# Patient Record
Sex: FEMALE | Race: WHITE | NOT HISPANIC OR LATINO | Employment: FULL TIME | URBAN - NONMETROPOLITAN AREA
[De-identification: names, ages, dates, MRNs, and addresses within clinical notes are randomized per-mention and may not be internally consistent; named-entity substitution may affect disease eponyms.]

---

## 2017-01-16 ENCOUNTER — OFFICE VISIT (OUTPATIENT)
Dept: FAMILY MEDICINE CLINIC | Facility: CLINIC | Age: 32
End: 2017-01-16

## 2017-01-16 VITALS
OXYGEN SATURATION: 99 % | SYSTOLIC BLOOD PRESSURE: 110 MMHG | BODY MASS INDEX: 24.07 KG/M2 | HEART RATE: 89 BPM | DIASTOLIC BLOOD PRESSURE: 60 MMHG | HEIGHT: 64 IN | TEMPERATURE: 98.5 F | WEIGHT: 141 LBS

## 2017-01-16 DIAGNOSIS — F41.9 ANXIETY: ICD-10-CM

## 2017-01-16 DIAGNOSIS — J06.9 ACUTE URI: Primary | ICD-10-CM

## 2017-01-16 DIAGNOSIS — J30.89 OTHER ALLERGIC RHINITIS: ICD-10-CM

## 2017-01-16 PROBLEM — G25.81 RESTLESS LEG SYNDROME: Status: ACTIVE | Noted: 2017-01-16

## 2017-01-16 PROCEDURE — 99214 OFFICE O/P EST MOD 30 MIN: CPT | Performed by: NURSE PRACTITIONER

## 2017-01-16 RX ORDER — MONTELUKAST SODIUM 10 MG/1
10 TABLET ORAL NIGHTLY
Qty: 30 TABLET | Refills: 5 | Status: SHIPPED | OUTPATIENT
Start: 2017-01-16 | End: 2017-08-31 | Stop reason: SDUPTHER

## 2017-01-16 RX ORDER — FLUTICASONE PROPIONATE 50 MCG
2 SPRAY, SUSPENSION (ML) NASAL DAILY
Qty: 1 BOTTLE | Refills: 5 | Status: SHIPPED | OUTPATIENT
Start: 2017-01-16 | End: 2017-04-04

## 2017-01-16 RX ORDER — ERGOCALCIFEROL 1.25 MG/1
50000 CAPSULE ORAL
Qty: 4 CAPSULE | Refills: 5 | Status: SHIPPED | OUTPATIENT
Start: 2017-01-16 | End: 2017-11-30

## 2017-01-16 RX ORDER — FLUOXETINE HYDROCHLORIDE 20 MG/1
20 CAPSULE ORAL DAILY
Qty: 30 CAPSULE | Refills: 5 | Status: SHIPPED | OUTPATIENT
Start: 2017-01-16 | End: 2017-08-31 | Stop reason: SDDI

## 2017-01-16 RX ORDER — ROPINIROLE 0.25 MG/1
0.25 TABLET, FILM COATED ORAL NIGHTLY
Qty: 30 TABLET | Refills: 2 | Status: SHIPPED | OUTPATIENT
Start: 2017-01-16 | End: 2017-01-16

## 2017-01-16 RX ORDER — CETIRIZINE HYDROCHLORIDE 10 MG/1
10 TABLET ORAL DAILY
Qty: 30 TABLET | Refills: 5 | Status: SHIPPED | OUTPATIENT
Start: 2017-01-16 | End: 2017-08-31 | Stop reason: SDUPTHER

## 2017-01-16 RX ORDER — PRENATAL VIT/IRON FUM/FOLIC AC 27MG-0.8MG
1 TABLET ORAL DAILY
Qty: 30 TABLET | Refills: 5 | Status: SHIPPED | OUTPATIENT
Start: 2017-01-16 | End: 2017-08-31 | Stop reason: SDUPTHER

## 2017-01-16 RX ORDER — FLUOXETINE HYDROCHLORIDE 20 MG/1
20 CAPSULE ORAL DAILY
Qty: 30 CAPSULE | Refills: 5 | Status: SHIPPED | OUTPATIENT
Start: 2017-01-16 | End: 2017-01-16 | Stop reason: SDUPTHER

## 2017-01-16 RX ORDER — ALBUTEROL SULFATE 90 UG/1
2 AEROSOL, METERED RESPIRATORY (INHALATION) EVERY 4 HOURS PRN
Qty: 1 INHALER | Refills: 5 | Status: SHIPPED | OUTPATIENT
Start: 2017-01-16 | End: 2017-08-31 | Stop reason: SDUPTHER

## 2017-01-16 RX ORDER — CEFDINIR 300 MG/1
300 CAPSULE ORAL 2 TIMES DAILY
Qty: 20 CAPSULE | Refills: 0 | Status: SHIPPED | OUTPATIENT
Start: 2017-01-16 | End: 2017-01-26

## 2017-01-16 RX ORDER — MONTELUKAST SODIUM 10 MG/1
10 TABLET ORAL NIGHTLY
Qty: 30 TABLET | Refills: 5 | Status: SHIPPED | OUTPATIENT
Start: 2017-01-16 | End: 2017-01-16 | Stop reason: SDUPTHER

## 2017-01-16 NOTE — MR AVS SNAPSHOT
Zhanna Veras   1/16/2017 11:40 AM   Office Visit    Dept Phone:  [phone]   Encounter #:  [id number]    Provider:  NABEEL Jenkins   Department:  [facility]                Your Full Care Plan              Today's Medication Changes          These changes are accurate as of: 1/16/17 12:03 PM.  If you have any questions, ask your nurse or doctor.               New Medication(s)Ordered:     cefdinir 300 MG capsule   Commonly known as:  OMNICEF   Take 1 capsule by mouth 2 (Two) Times a Day for 10 days.   Started by:  NABEEL Jenkins       fluticasone 50 MCG/ACT nasal spray   Commonly known as:  FLONASE   2 sprays into each nostril Daily. Administer 2 sprays in each nostril for each dose.   Started by:  NABEEL Jenkins         Medication(s)that have changed:     FLUoxetine 20 MG capsule   Commonly known as:  PROzac   Take 1 capsule by mouth Daily.   What changed:    - medication strength  - how much to take   Changed by:  NABEEL Jenkins       Fluticasone Furoate-Vilanterol 100-25 MCG/INH aerosol powder    Commonly known as:  BREO ELLIPTA   Inhale 1 puff Daily.   What changed:    - how much to take  - when to take this   Changed by:  NABEEL Jenkins            Where to Get Your Medications      These medications were sent to [address] - [phone]  - [phone] [address] [address]     Phone:  [phone]     fluticasone 50 MCG/ACT nasal spray         These medications were sent to [facility] [address] [place] [address] - [address] [place] [address] - [phone] PH - [phone] FX  [address] [place] [address]     Phone:  [phone]     albuterol 108 (90 BASE) MCG/ACT inhaler    cefdinir 300 MG capsule    cetirizine 10 MG tablet    FLUoxetine 20 MG capsule    Fluticasone  Furoate-Vilanterol 100-25 MCG/INH aerosol powder     montelukast 10 MG tablet    prenatal vitamin 27-0.8 27-0.8 MG tablet tablet    vitamin D 39218 UNITS capsule capsule                  Your Updated Medication List          This list is accurate as of: 1/16/17 12:03 PM.  Always use your most recent med list.                albuterol 108 (90 BASE) MCG/ACT inhaler   Commonly known as:  PROVENTIL HFA;VENTOLIN HFA   Inhale 2 puffs Every 4 (Four) Hours As Needed for wheezing. 1-2 puffs every 4 hours as needed       cefdinir 300 MG capsule   Commonly known as:  OMNICEF   Take 1 capsule by mouth 2 (Two) Times a Day for 10 days.       cetirizine 10 MG tablet   Commonly known as:  zyrTEC   Take 1 tablet by mouth Daily.       FLUoxetine 20 MG capsule   Commonly known as:  PROzac   Take 1 capsule by mouth Daily.       fluticasone 50 MCG/ACT nasal spray   Commonly known as:  FLONASE   2 sprays into each nostril Daily. Administer 2 sprays in each nostril for each dose.       Fluticasone Furoate-Vilanterol 100-25 MCG/INH aerosol powder    Commonly known as:  BREO ELLIPTA   Inhale 1 puff Daily.       montelukast 10 MG tablet   Commonly known as:  SINGULAIR   Take 1 tablet by mouth Every Night.       ondansetron 4 MG tablet   Commonly known as:  ZOFRAN   Take 1 tablet by mouth Every 8 (Eight) Hours As Needed for nausea or vomiting.       prenatal vitamin 27-0.8 27-0.8 MG tablet tablet   Take 1 tablet by mouth Daily.       vitamin D 95794 UNITS capsule capsule   Commonly known as:  ERGOCALCIFEROL   Take 1 capsule by mouth Every 7 (Seven) Days.               You Were Diagnosed With        Codes Comments    Acute URI    -  Primary ICD-10-CM: J06.9  ICD-9-CM: 465.9     Anxiety     ICD-10-CM: F41.9  ICD-9-CM: 300.00     Other allergic rhinitis     ICD-10-CM: J30.89  ICD-9-CM: 477.8 refill routine medications  allergy precautions        Instructions     None    Patient Instructions History      Upcoming Appointments     Visit Type Date  "Time Department    OFFICE VISIT 1/16/2017 11:40 AM [facility]    OFFICE VISIT 7/20/2017  5:00 PM [facility]      [employer] Signup     Our records indicate that you have an active [facility] [employer] account.    You can view your After Visit Summary by going to [employer] and logging in with your [employer] username and password.  If you don't have a [employer] username and password but a parent or guardian has access to your record, the parent or guardian should login with their own [employer] username and password and access your record to view the After Visit Summary.    If you have questions, you can email [email or web address] or call [phone] to talk to our [employer] staff.  Remember, [employer] is NOT to be used for urgent needs.  For medical emergencies, dial 911.               Other Info from Your Visit           Your Appointments     Jul 20, 2017  5:00 PM EDT   Office Visit with NABEEL Jenkins   [facility] (--)    [address] [place] [id number] [phone]           Please arrive 10 minutes early, bring a complete list of all medications and bring any previous records or diagnostic testing results.              Allergies     Penicillins        Reason for Visit     URI     Anxiety     Restless Legs Syndrome           Vital Signs     Blood Pressure Pulse Temperature Height Weight Last Menstrual Period    110/60 (BP Location: Right arm, Patient Position: Sitting, Cuff Size: Adult) 89 98.5 °F (36.9 °C) (Oral) 64\" (162.6 cm) 141 lb (64 kg) 12/24/2016    Oxygen Saturation Body Mass Index Smoking Status             99% 24.2 kg/m2 Former Smoker         Problems and Diagnoses Noted     Restless leg syndrome    Acute upper respiratory infection    -  Primary    Anxiety problem        Other allergic rhinitis            "

## 2017-01-16 NOTE — PROGRESS NOTES
Subjective   Zhanna Veras is a 31 y.o. female.     History of Present Illness     Uri symptoms   Head pressure and sinus pressure for one week, ears feel full.     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 She is feeling anxious and would like to discuss raising her Prozac. Denies thoughts of hurting self or others.     Asthma  Asthma symptoms are stable with use of Breo. No recent exacerbations.    Allergic Rhinitis  Pt has allergic rhinitis. Recent exacerbation present. She is going in and out of different homes for work and has no control over exposure to environmental triggers.        The following portions of the patient's history were reviewed and updated as appropriate: allergies, current medications, past family history, past medical history, past social history, past surgical history and problem list.    Review of Systems   Constitutional: Negative for activity change, appetite change, chills, fatigue and fever.   HENT: Positive for congestion, ear pain, sinus pressure and sore throat. Negative for facial swelling, hearing loss, trouble swallowing and voice change.    Eyes: Positive for itching. Negative for pain, discharge and visual disturbance.   Respiratory: Negative for apnea, cough, chest tightness, shortness of breath and wheezing.    Cardiovascular: Negative for chest pain, palpitations and leg swelling.   Gastrointestinal: Negative for abdominal pain, anal bleeding, blood in stool, constipation, diarrhea, nausea and vomiting.   Genitourinary: Negative for dysuria.   Musculoskeletal: Negative for arthralgias and neck stiffness.   Skin: Negative for color change.   Allergic/Immunologic: Positive for environmental allergies. Negative for food allergies and immunocompromised state.   Neurological: Positive for headaches (sinus region bilateral).   Hematological: Negative for adenopathy. Does not bruise/bleed easily.    Psychiatric/Behavioral: Negative for confusion, self-injury, sleep disturbance and suicidal ideas. The patient is nervous/anxious.    All other systems reviewed and are negative.      Objective   Physical Exam   Constitutional: She is oriented to person, place, and time. She appears well-developed and well-nourished. No distress.   HENT:   Head: Normocephalic.   Right Ear: External ear normal. Tympanic membrane is erythematous. A middle ear effusion is present.   Left Ear: Hearing, tympanic membrane, external ear and ear canal normal.   Nose: Right sinus exhibits maxillary sinus tenderness and frontal sinus tenderness. Left sinus exhibits no maxillary sinus tenderness and no frontal sinus tenderness.   Mouth/Throat: Uvula is midline and mucous membranes are normal. Oropharyngeal exudate and posterior oropharyngeal erythema present.   Eyes: EOM and lids are normal. Pupils are equal, round, and reactive to light.   Neck: Normal range of motion. Neck supple. No tracheal deviation present. No thyromegaly present.   Cardiovascular: Normal rate, regular rhythm and normal heart sounds.  Exam reveals no gallop and no friction rub.    No murmur heard.  Pulmonary/Chest: Effort normal and breath sounds normal. No respiratory distress. She has no wheezes. She has no rales. She exhibits no tenderness.   Abdominal: Soft. Bowel sounds are normal. She exhibits no distension and no mass. There is no tenderness. There is no rebound and no guarding.   Musculoskeletal: Normal range of motion.   Neurological: She is alert and oriented to person, place, and time. She has normal reflexes.   CN 2-12 grossly intact    Skin: Skin is warm and dry. No rash noted. No erythema.   Psychiatric: She has a normal mood and affect. Her speech is normal and behavior is normal. Judgment and thought content normal. Cognition and memory are normal.   Vitals reviewed.      Assessment/Plan   Zhanna was seen today for uri, anxiety, asthma and allergic  rhinitis.    Diagnoses and all orders for this visit:    Acute URI  Fluids, rest, symptomatic treatment advised. Discussed symptoms to report as well as reasons to seek urgent or emergent medical attention. Understanding stated.   RTC 2-5 days if not improved, sooner if condition worsens/changes. Symptomatic care advised as well as reasons for urgent or emergent care. Pt / family state understanding.     Anxiety  -     Discontinue: FLUoxetine (PROzac) 20 MG capsule; Take 1 capsule by mouth Daily.  -     FLUoxetine (PROzac) 20 MG capsule; Take 1 capsule by mouth Daily.    Other allergic rhinitis  Comments:  refill routine medications  allergy precautions    Orders:  -     montelukast (SINGULAIR) 10 MG tablet; Take 1 tablet by mouth Every Night.    Other orders  -     fluticasone (FLONASE) 50 MCG/ACT nasal spray; 2 sprays into each nostril Daily. Administer 2 sprays in each nostril for each dose.  -     cefdinir (OMNICEF) 300 MG capsule; Take 1 capsule by mouth 2 (Two) Times a Day for 10 days.  -     cetirizine (zyrTEC) 10 MG tablet; Take 1 tablet by mouth Daily.  -     Fluticasone Furoate-Vilanterol (BREO ELLIPTA) 100-25 MCG/INH aerosol powder ; Inhale 1 puff Daily.  -     Prenatal Vit-Fe Fumarate-FA (PRENATAL VITAMIN 27-0.8) 27-0.8 MG tablet tablet; Take 1 tablet by mouth Daily.  -     vitamin D (ERGOCALCIFEROL) 35638 UNITS capsule capsule; Take 1 capsule by mouth Every 7 (Seven) Days.  -     albuterol (PROVENTIL HFA;VENTOLIN HFA) 108 (90 BASE) MCG/ACT inhaler; Inhale 2 puffs Every 4 (Four) Hours As Needed for wheezing. 1-2 puffs every 4 hours as needed        I have discussed diagnosis in detail today allowing time for questions and answers. Pt is aware of reasons to seek urgent or emergent medical care as well as reasons to return to the clinic for evaluation. Possible side effects, interactions and progression of symptoms discussed as well. Pt / family states understanding.   Samples of Breo provided x 3 months  supply.  Asthma / Allergy precautions reviewed.  Emotional support and active listening provided.   Will raise Prozac to 20 mg daily and evaluate for improved symptoms over the next few weeks. Pt understands reasons for additional medical visit and symptoms to report.   Follow up 3-6 months , sooner if needed.

## 2017-04-04 ENCOUNTER — OFFICE VISIT (OUTPATIENT)
Dept: FAMILY MEDICINE CLINIC | Facility: CLINIC | Age: 32
End: 2017-04-04

## 2017-04-04 VITALS
SYSTOLIC BLOOD PRESSURE: 94 MMHG | WEIGHT: 134 LBS | HEART RATE: 103 BPM | TEMPERATURE: 98.7 F | BODY MASS INDEX: 22.88 KG/M2 | HEIGHT: 64 IN | DIASTOLIC BLOOD PRESSURE: 60 MMHG | OXYGEN SATURATION: 97 %

## 2017-04-04 DIAGNOSIS — M62.830 MUSCLE SPASM OF BACK: ICD-10-CM

## 2017-04-04 DIAGNOSIS — M54.42 ACUTE BILATERAL LOW BACK PAIN WITH LEFT-SIDED SCIATICA: Primary | ICD-10-CM

## 2017-04-04 PROCEDURE — 99214 OFFICE O/P EST MOD 30 MIN: CPT | Performed by: NURSE PRACTITIONER

## 2017-04-04 PROCEDURE — 96372 THER/PROPH/DIAG INJ SC/IM: CPT | Performed by: NURSE PRACTITIONER

## 2017-04-04 RX ORDER — TIZANIDINE HYDROCHLORIDE 4 MG/1
4 CAPSULE, GELATIN COATED ORAL 2 TIMES DAILY PRN
Qty: 60 CAPSULE | Refills: 1 | Status: SHIPPED | OUTPATIENT
Start: 2017-04-04 | End: 2017-08-31

## 2017-04-04 RX ORDER — IBUPROFEN 600 MG/1
600 TABLET ORAL EVERY 8 HOURS PRN
Qty: 30 TABLET | Refills: 5 | Status: SHIPPED | OUTPATIENT
Start: 2017-04-04 | End: 2017-08-31

## 2017-04-04 RX ORDER — METHYLPREDNISOLONE ACETATE 40 MG/ML
40 INJECTION, SUSPENSION INTRA-ARTICULAR; INTRALESIONAL; INTRAMUSCULAR; SOFT TISSUE ONCE
Status: COMPLETED | OUTPATIENT
Start: 2017-04-04 | End: 2017-04-04

## 2017-04-04 RX ORDER — KETOROLAC TROMETHAMINE 30 MG/ML
60 INJECTION, SOLUTION INTRAMUSCULAR; INTRAVENOUS ONCE
Status: COMPLETED | OUTPATIENT
Start: 2017-04-04 | End: 2017-04-04

## 2017-04-04 RX ADMIN — KETOROLAC TROMETHAMINE 60 MG: 30 INJECTION, SOLUTION INTRAMUSCULAR; INTRAVENOUS at 11:44

## 2017-04-04 RX ADMIN — METHYLPREDNISOLONE ACETATE 40 MG: 40 INJECTION, SUSPENSION INTRA-ARTICULAR; INTRALESIONAL; INTRAMUSCULAR; SOFT TISSUE at 11:46

## 2017-04-04 NOTE — PROGRESS NOTES
Subjective   Zhanna Veras is a 31 y.o. female.     History of Present Illness     Acute onset of low back pain   Pt reports some low back pain with radiation into her left hip. This started about 3 weeks ago after doing some work on her porch, lifting some boards.   Pain is gradually getting worse. Rated 7-8 on psr. Described as stabbing pain in lower back at her lumbar section. Rest and laying flat helps, activity makes worse.   Has taken some motrin which helps mildly. Not tried heat.She is trying to avoid lifting. No changes in bowel or bladder function.         The following portions of the patient's history were reviewed and updated as appropriate: allergies, current medications, past family history, past medical history, past social history, past surgical history and problem list.    Review of Systems   Constitutional: Positive for activity change. Negative for appetite change, chills, fatigue, fever and unexpected weight change.   HENT: Negative.    Eyes: Negative.    Respiratory: Negative for cough, chest tightness and wheezing.    Cardiovascular: Negative for chest pain, palpitations and leg swelling.   Gastrointestinal: Negative for abdominal pain, constipation, nausea and vomiting.   Endocrine: Negative.    Genitourinary: Negative for difficulty urinating and dysuria.   Musculoskeletal: Positive for back pain. Negative for neck pain.   Skin: Negative for rash.   Allergic/Immunologic: Positive for environmental allergies.   Neurological: Negative for tremors and numbness.   Hematological: Negative.    Psychiatric/Behavioral: Negative for self-injury and suicidal ideas. The patient is not nervous/anxious.    All other systems reviewed and are negative.      Objective   Physical Exam   Constitutional: She is oriented to person, place, and time. She appears well-developed and well-nourished.   HENT:   Head: Normocephalic.   Right Ear: External ear normal.   Left Ear: External ear normal.   Nose: Nose  normal.   Mouth/Throat: Oropharynx is clear and moist.   Eyes: Pupils are equal, round, and reactive to light.   Neck: Normal range of motion. Neck supple. No tracheal deviation present. No thyromegaly present.   Cardiovascular: Normal rate, regular rhythm and normal heart sounds.  Exam reveals no gallop and no friction rub.    No murmur heard.  Pulmonary/Chest: Effort normal and breath sounds normal. No respiratory distress. She has no wheezes. She has no rales. She exhibits no tenderness.   Abdominal: Soft. Bowel sounds are normal. She exhibits no distension and no mass. There is no tenderness. There is no rebound and no guarding.   Musculoskeletal: Normal range of motion.        Left hip: She exhibits tenderness and crepitus.        Lumbar back: She exhibits tenderness, pain and spasm.   Neurological: She is alert and oriented to person, place, and time. She has normal strength and normal reflexes.   CN 2-12 grossly intact    Skin: Skin is warm and dry. No rash noted. No erythema.   Psychiatric: She has a normal mood and affect. Her speech is normal and behavior is normal. Judgment and thought content normal. Cognition and memory are normal.   Vitals reviewed.      Assessment/Plan   Zhanna was seen today for back pain.    Diagnoses and all orders for this visit:    Acute bilateral low back pain with left-sided sciatica  -     XR Spine Lumbar 2 or 3 View  -     XR Hip With or Without Pelvis 2 - 3 View Left; Future  -     ketorolac (TORADOL) injection 60 mg; Inject 60 mg into the shoulder, thigh, or buttocks 1 (One) Time.  -     methylPREDNISolone acetate (DEPO-medrol) injection 40 mg; Inject 1 mL into the shoulder, thigh, or buttocks 1 (One) Time.    Muscle spasm of back    Other orders  -     TiZANidine (ZANAFLEX) 4 MG capsule; Take 1 capsule by mouth 2 (Two) Times a Day As Needed for Muscle Spasms.  -     ibuprofen (ADVIL,MOTRIN) 600 MG tablet; Take 1 tablet by mouth Every 8 (Eight) Hours As Needed for Mild Pain  (1-3) or Moderate Pain (4-6).      Avoid lifting > 20 pounds and twisting movement for the next week. No sweeping, moping , etc.   Order for xrays.   Toradol and depo-medrol injection today.   Body mechanics reviewed.  I have discussed diagnosis in detail today allowing time for questions and answers. Pt is aware of reasons to seek urgent or emergent medical care as well as reasons to return to the clinic for evaluation. Possible side effects, interactions and progression of symptoms discussed as well. Pt / family states understanding.   Follow up 2-5 days if not improved.

## 2017-08-31 ENCOUNTER — OFFICE VISIT (OUTPATIENT)
Dept: FAMILY MEDICINE CLINIC | Facility: CLINIC | Age: 32
End: 2017-08-31

## 2017-08-31 VITALS
DIASTOLIC BLOOD PRESSURE: 70 MMHG | HEIGHT: 64 IN | OXYGEN SATURATION: 99 % | SYSTOLIC BLOOD PRESSURE: 110 MMHG | TEMPERATURE: 98 F | HEART RATE: 91 BPM | BODY MASS INDEX: 24.24 KG/M2 | WEIGHT: 142 LBS

## 2017-08-31 DIAGNOSIS — J45.20 MILD INTERMITTENT ASTHMA WITHOUT COMPLICATION: Primary | ICD-10-CM

## 2017-08-31 DIAGNOSIS — J30.89 OTHER ALLERGIC RHINITIS: ICD-10-CM

## 2017-08-31 DIAGNOSIS — E55.9 VITAMIN D DEFICIENCY: ICD-10-CM

## 2017-08-31 DIAGNOSIS — J45.901 ASTHMA EXACERBATION: ICD-10-CM

## 2017-08-31 DIAGNOSIS — R53.82 CHRONIC FATIGUE: ICD-10-CM

## 2017-08-31 PROCEDURE — 99214 OFFICE O/P EST MOD 30 MIN: CPT | Performed by: NURSE PRACTITIONER

## 2017-08-31 RX ORDER — MONTELUKAST SODIUM 10 MG/1
10 TABLET ORAL NIGHTLY
Qty: 30 TABLET | Refills: 5 | Status: SHIPPED | OUTPATIENT
Start: 2017-08-31 | End: 2018-02-22 | Stop reason: SDUPTHER

## 2017-08-31 RX ORDER — FLUTICASONE PROPIONATE 50 MCG
2 SPRAY, SUSPENSION (ML) NASAL DAILY
Qty: 1 BOTTLE | Refills: 5 | Status: SHIPPED | OUTPATIENT
Start: 2017-08-31 | End: 2017-11-30

## 2017-08-31 RX ORDER — ALBUTEROL SULFATE 90 UG/1
2 AEROSOL, METERED RESPIRATORY (INHALATION) EVERY 4 HOURS PRN
Qty: 1 INHALER | Refills: 5 | Status: SHIPPED | OUTPATIENT
Start: 2017-08-31 | End: 2017-11-30 | Stop reason: SDUPTHER

## 2017-08-31 RX ORDER — METHYLPREDNISOLONE 4 MG/1
TABLET ORAL
Qty: 1 EACH | Refills: 0 | Status: SHIPPED | OUTPATIENT
Start: 2017-08-31 | End: 2017-11-30

## 2017-08-31 RX ORDER — CETIRIZINE HYDROCHLORIDE 10 MG/1
10 TABLET ORAL DAILY
Qty: 30 TABLET | Refills: 5 | Status: SHIPPED | OUTPATIENT
Start: 2017-08-31 | End: 2018-02-22 | Stop reason: SDUPTHER

## 2017-08-31 RX ORDER — SULFAMETHOXAZOLE AND TRIMETHOPRIM 800; 160 MG/1; MG/1
1 TABLET ORAL 2 TIMES DAILY
Qty: 20 TABLET | Refills: 0 | Status: SHIPPED | OUTPATIENT
Start: 2017-08-31 | End: 2017-11-30

## 2017-08-31 RX ORDER — PRENATAL VIT/IRON FUM/FOLIC AC 27MG-0.8MG
1 TABLET ORAL DAILY
Qty: 30 TABLET | Refills: 5 | Status: SHIPPED | OUTPATIENT
Start: 2017-08-31 | End: 2018-02-22 | Stop reason: SDUPTHER

## 2017-08-31 RX ORDER — GUAIFENESIN/DEXTROMETHORPHAN 100-10MG/5
5 SYRUP ORAL 3 TIMES DAILY PRN
Qty: 236 ML | Refills: 0 | Status: SHIPPED | OUTPATIENT
Start: 2017-08-31 | End: 2017-11-30

## 2017-09-06 ENCOUNTER — LAB (OUTPATIENT)
Dept: FAMILY MEDICINE CLINIC | Facility: CLINIC | Age: 32
End: 2017-09-06

## 2017-09-06 DIAGNOSIS — J30.89 OTHER ALLERGIC RHINITIS: ICD-10-CM

## 2017-09-06 LAB
25(OH)D3+25(OH)D2 SERPL-MCNC: 28 NG/ML
ALBUMIN SERPL-MCNC: 4.7 G/DL (ref 3.5–5)
ALBUMIN/GLOB SERPL: 1.6 G/DL (ref 1.5–2.5)
ALP SERPL-CCNC: 60 U/L (ref 35–104)
ALT SERPL-CCNC: 12 U/L (ref 10–36)
AST SERPL-CCNC: 16 U/L (ref 10–30)
BASOPHILS # BLD AUTO: 0.02 10*3/MM3 (ref 0–0.3)
BASOPHILS NFR BLD AUTO: 0.2 % (ref 0–2)
BILIRUB SERPL-MCNC: 0.3 MG/DL (ref 0.2–1.8)
BUN SERPL-MCNC: 9 MG/DL (ref 7–21)
BUN/CREAT SERPL: 15 (ref 7–25)
CALCIUM SERPL-MCNC: 9.6 MG/DL (ref 7.7–10)
CHLORIDE SERPL-SCNC: 108 MMOL/L (ref 99–112)
CHOLEST SERPL-MCNC: 162 MG/DL (ref 0–200)
CO2 SERPL-SCNC: 24.2 MMOL/L (ref 24.3–31.9)
CREAT SERPL-MCNC: 0.6 MG/DL (ref 0.43–1.29)
EOSINOPHIL # BLD AUTO: 0.02 10*3/MM3 (ref 0–0.7)
EOSINOPHIL NFR BLD AUTO: 0.2 % (ref 0–5)
ERYTHROCYTE [DISTWIDTH] IN BLOOD BY AUTOMATED COUNT: 13.4 % (ref 11.5–14.5)
GLOBULIN SER CALC-MCNC: 2.9 GM/DL
GLUCOSE SERPL-MCNC: 93 MG/DL (ref 70–110)
HBA1C MFR BLD: 5.1 % (ref 4.5–5.7)
HCT VFR BLD AUTO: 39.2 % (ref 37–47)
HDLC SERPL-MCNC: 38 MG/DL (ref 60–100)
HGB BLD-MCNC: 12.3 G/DL (ref 12–16)
IMM GRANULOCYTES # BLD: 0.02 10*3/MM3 (ref 0–0.03)
IMM GRANULOCYTES NFR BLD: 0.2 % (ref 0–0.5)
LDLC SERPL CALC-MCNC: 112 MG/DL (ref 0–100)
LYMPHOCYTES # BLD AUTO: 1.75 10*3/MM3 (ref 1–3)
LYMPHOCYTES NFR BLD AUTO: 19.8 % (ref 21–51)
MCH RBC QN AUTO: 27.5 PG (ref 27–33)
MCHC RBC AUTO-ENTMCNC: 31.4 G/DL (ref 33–37)
MCV RBC AUTO: 87.7 FL (ref 80–94)
MONOCYTES # BLD AUTO: 0.42 10*3/MM3 (ref 0.1–0.9)
MONOCYTES NFR BLD AUTO: 4.7 % (ref 0–10)
NEUTROPHILS # BLD AUTO: 6.62 10*3/MM3 (ref 1.4–6.5)
NEUTROPHILS NFR BLD AUTO: 74.9 % (ref 30–70)
PLATELET # BLD AUTO: 286 10*3/MM3 (ref 130–400)
POTASSIUM SERPL-SCNC: 4 MMOL/L (ref 3.5–5.3)
PROT SERPL-MCNC: 7.6 G/DL (ref 6–8)
RBC # BLD AUTO: 4.47 10*6/MM3 (ref 4.2–5.4)
SODIUM SERPL-SCNC: 138 MMOL/L (ref 135–153)
TRIGL SERPL-MCNC: 62 MG/DL (ref 0–150)
TSH SERPL DL<=0.005 MIU/L-ACNC: 0.95 MIU/ML (ref 0.55–4.78)
VIT B12 SERPL-MCNC: 791 PG/ML (ref 211–911)
VLDLC SERPL CALC-MCNC: 12.4 MG/DL
WBC # BLD AUTO: 8.85 10*3/MM3 (ref 4.5–12.5)

## 2017-09-06 PROCEDURE — 36415 COLL VENOUS BLD VENIPUNCTURE: CPT | Performed by: NURSE PRACTITIONER

## 2017-09-13 ENCOUNTER — TELEPHONE (OUTPATIENT)
Dept: FAMILY MEDICINE CLINIC | Facility: CLINIC | Age: 32
End: 2017-09-13

## 2017-09-13 DIAGNOSIS — R30.0 DYSURIA: Primary | ICD-10-CM

## 2017-09-13 RX ORDER — AZITHROMYCIN 250 MG/1
TABLET, FILM COATED ORAL
Qty: 7 TABLET | Refills: 0 | Status: SHIPPED | OUTPATIENT
Start: 2017-09-13 | End: 2017-11-30

## 2017-09-13 NOTE — TELEPHONE ENCOUNTER
I called michael wing in and let her know that's at [facility] and she has appointment at [facility] on Sept 25 with dr Dr Bell

## 2017-09-13 NOTE — TELEPHONE ENCOUNTER
Health department has sent out stating patient has positive nitrates in her urine as well as having a positive pregnancy test.  Medical assistant has been instructed to notify the patient to stop all medications other than her asthma medicine and start a Z-Domingo #1.  Patient has been advised to schedule a gynecology appointment for OB.

## 2017-10-09 LAB
EXTERNAL ABO GROUPING: NORMAL
EXTERNAL ANTIBODY SCREEN: NEGATIVE
EXTERNAL HEPATITIS B SURFACE ANTIGEN: NEGATIVE
EXTERNAL RH FACTOR: POSITIVE
EXTERNAL RUBELLA QUALITATIVE: NORMAL
EXTERNAL SYPHILIS RPR SCREEN: NORMAL
HIV1 P24 AG SERPL QL IA: NEGATIVE

## 2017-11-30 ENCOUNTER — OFFICE VISIT (OUTPATIENT)
Dept: FAMILY MEDICINE CLINIC | Facility: CLINIC | Age: 32
End: 2017-11-30

## 2017-11-30 VITALS
BODY MASS INDEX: 25.95 KG/M2 | WEIGHT: 152 LBS | OXYGEN SATURATION: 97 % | HEART RATE: 79 BPM | HEIGHT: 64 IN | TEMPERATURE: 98.2 F | SYSTOLIC BLOOD PRESSURE: 110 MMHG | DIASTOLIC BLOOD PRESSURE: 70 MMHG

## 2017-11-30 DIAGNOSIS — Z79.899 HIGH RISK MEDICATION USE: ICD-10-CM

## 2017-11-30 DIAGNOSIS — Z3A.16 16 WEEKS GESTATION OF PREGNANCY: ICD-10-CM

## 2017-11-30 DIAGNOSIS — J45.20 MILD INTERMITTENT ASTHMA WITHOUT COMPLICATION: Primary | ICD-10-CM

## 2017-11-30 DIAGNOSIS — J30.2 ACUTE SEASONAL ALLERGIC RHINITIS, UNSPECIFIED TRIGGER: ICD-10-CM

## 2017-11-30 DIAGNOSIS — J45.901 EXACERBATION OF PERSISTENT ASTHMA, UNSPECIFIED ASTHMA SEVERITY: ICD-10-CM

## 2017-11-30 PROCEDURE — 99214 OFFICE O/P EST MOD 30 MIN: CPT | Performed by: NURSE PRACTITIONER

## 2017-11-30 RX ORDER — ALBUTEROL SULFATE 90 UG/1
2 AEROSOL, METERED RESPIRATORY (INHALATION) EVERY 4 HOURS PRN
Qty: 1 INHALER | Refills: 5 | Status: SHIPPED | OUTPATIENT
Start: 2017-11-30 | End: 2018-02-22 | Stop reason: SDUPTHER

## 2017-11-30 RX ORDER — GUAIFENESIN AND CODEINE PHOSPHATE 100; 10 MG/5ML; MG/5ML
5 SOLUTION ORAL 3 TIMES DAILY PRN
Qty: 180 ML | Refills: 0 | Status: SHIPPED | OUTPATIENT
Start: 2017-11-30 | End: 2018-02-22

## 2017-11-30 RX ORDER — GUAIFENESIN AND CODEINE PHOSPHATE 100; 10 MG/5ML; MG/5ML
5 SOLUTION ORAL 3 TIMES DAILY PRN
Qty: 180 ML | Refills: 0 | Status: SHIPPED | OUTPATIENT
Start: 2017-11-30 | End: 2017-11-30 | Stop reason: SDUPTHER

## 2017-11-30 NOTE — PROGRESS NOTES
"Yoni Veras is a 31 y.o. female.     Chief Complaint   Patient presents with   • Asthma   • Cough       History of Present Illness     Pt is pregnant and under the care of OB/GYN. This will be her fourth child. She is having some exacerbation of cough. Cough is worse at night . Coughs so hard at times that she leaks urine.  She is out of her rescue inhaler. She has been seen by pulmonology in the past but not seen by allergy/asthma.     Currently taking zyrtec, singulair and Spriva.     Seasonal AR symptoms - frequent seasonal exacerbations.       The following portions of the patient's history were reviewed and updated as appropriate: allergies, current medications, past family history, past medical history, past social history, past surgical history and problem list.    Review of Systems   Constitutional: Positive for activity change. Negative for appetite change, chills, fatigue and fever.   HENT: Negative for sore throat and trouble swallowing.    Eyes: Visual disturbance: wears glasses    Respiratory: Positive for cough, shortness of breath (during coughing spells) and wheezing (at night ). Negative for apnea and chest tightness.    Cardiovascular: Negative for chest pain and palpitations.   Gastrointestinal: Negative for abdominal pain, constipation, diarrhea and vomiting.   Endocrine: Negative.    Genitourinary: Negative for difficulty urinating and flank pain.   Allergic/Immunologic: Positive for environmental allergies.   Neurological: Negative.    Psychiatric/Behavioral: Negative for decreased concentration and suicidal ideas. The patient is not nervous/anxious.        Objective     /70 (BP Location: Right arm, Patient Position: Sitting, Cuff Size: Adult)  Pulse 79  Temp 98.2 °F (36.8 °C) (Tympanic)   Ht 64\" (162.6 cm)  Wt 152 lb (68.9 kg)  LMP 08/13/2017  SpO2 97%  BMI 26.09 kg/m2  Lab on 09/06/2017   Component Date Value Ref Range Status   • 25 Hydroxy, Vitamin D 09/06/2017 " 28.0  ng/mL Final   • Total Cholesterol 09/06/2017 162  0 - 200 mg/dL Final   • Triglycerides 09/06/2017 62  0 - 150 mg/dL Final   • HDL Cholesterol 09/06/2017 38* 60 - 100 mg/dL Final   • VLDL Cholesterol 09/06/2017 12.4  mg/dL Final   • LDL Cholesterol  09/06/2017 112* 0 - 100 mg/dL Final   • Vitamin B-12 09/06/2017 791  211 - 911 pg/mL Final   • Glucose 09/06/2017 93  70 - 110 mg/dL Final   • BUN 09/06/2017 9  7 - 21 mg/dL Final   • Creatinine 09/06/2017 0.60  0.43 - 1.29 mg/dL Final   • eGFR Non  Am 09/06/2017 117  >60 mL/min/1.73 Final   • eGFR African Am 09/06/2017 141  >60 mL/min/1.73 Final   • BUN/Creatinine Ratio 09/06/2017 15.0  7.0 - 25.0 Final   • Sodium 09/06/2017 138  135 - 153 mmol/L Final   • Potassium 09/06/2017 4.0  3.5 - 5.3 mmol/L Final   • Chloride 09/06/2017 108  99 - 112 mmol/L Final   • Total CO2 09/06/2017 24.2* 24.3 - 31.9 mmol/L Final   • Calcium 09/06/2017 9.6  7.7 - 10.0 mg/dL Final   • Total Protein 09/06/2017 7.6  6.0 - 8.0 g/dL Final   • Albumin 09/06/2017 4.70  3.50 - 5.00 g/dL Final   • Globulin 09/06/2017 2.9  gm/dL Final   • A/G Ratio 09/06/2017 1.6  1.5 - 2.5 g/dL Final   • Total Bilirubin 09/06/2017 0.3  0.2 - 1.8 mg/dL Final   • Alkaline Phosphatase 09/06/2017 60  35 - 104 U/L Final   • AST (SGOT) 09/06/2017 16  10 - 30 U/L Final   • ALT (SGPT) 09/06/2017 12  10 - 36 U/L Final   • TSH 09/06/2017 0.954  0.550 - 4.780 mIU/mL Final   • Hemoglobin A1C 09/06/2017 5.10  4.50 - 5.70 % Final   • WBC 09/06/2017 8.85  4.50 - 12.50 10*3/mm3 Final   • RBC 09/06/2017 4.47  4.20 - 5.40 10*6/mm3 Final   • Hemoglobin 09/06/2017 12.3  12.0 - 16.0 g/dL Final   • Hematocrit 09/06/2017 39.2  37.0 - 47.0 % Final   • MCV 09/06/2017 87.7  80.0 - 94.0 fL Final   • MCH 09/06/2017 27.5  27.0 - 33.0 pg Final   • MCHC 09/06/2017 31.4* 33.0 - 37.0 g/dL Final   • RDW 09/06/2017 13.4  11.5 - 14.5 % Final   • Platelets 09/06/2017 286  130 - 400 10*3/mm3 Final   • Neutrophil Rel % 09/06/2017 74.9* 30.0  - 70.0 % Final   • Lymphocyte Rel % 09/06/2017 19.8* 21.0 - 51.0 % Final   • Monocyte Rel % 09/06/2017 4.7  0.0 - 10.0 % Final   • Eosinophil Rel % 09/06/2017 0.2  0.0 - 5.0 % Final   • Basophil Rel % 09/06/2017 0.2  0.0 - 2.0 % Final   • Neutrophils Absolute 09/06/2017 6.62* 1.40 - 6.50 10*3/mm3 Final   • Lymphocytes Absolute 09/06/2017 1.75  1.00 - 3.00 10*3/mm3 Final   • Monocytes Absolute 09/06/2017 0.42  0.10 - 0.90 10*3/mm3 Final   • Eosinophils Absolute 09/06/2017 0.02  0.00 - 0.70 10*3/mm3 Final   • Basophils Absolute 09/06/2017 0.02  0.00 - 0.30 10*3/mm3 Final   • Immature Granulocyte Rel % 09/06/2017 0.2  0.0 - 0.5 % Final   • Immature Grans Absolute 09/06/2017 0.02  0.00 - 0.03 10*3/mm3 Final       Physical Exam   Constitutional: She is oriented to person, place, and time. She appears well-developed and well-nourished. No distress.   HENT:   Head: Normocephalic and atraumatic.   Right Ear: External ear normal.   Left Ear: External ear normal.   Nose: Nose normal.   Mouth/Throat: Oropharynx is clear and moist. No oropharyngeal exudate.   Eyes: Pupils are equal, round, and reactive to light.   Neck: Neck supple. No thyromegaly present.   Cardiovascular: Normal rate, regular rhythm and normal heart sounds.    Pulmonary/Chest: Effort normal and breath sounds normal. No respiratory distress. She has no wheezes. She exhibits tenderness (generalized due to cough ). She exhibits no bony tenderness and no crepitus.   Abdominal: Soft. She exhibits no distension. There is no tenderness.   Musculoskeletal: Normal range of motion.   Lymphadenopathy:     She has no cervical adenopathy.   Neurological: She is alert and oriented to person, place, and time.   Skin: Skin is warm and dry. She is not diaphoretic.   Psychiatric: She has a normal mood and affect. Her behavior is normal.   Vitals reviewed.      Assessment/Plan     Problem List Items Addressed This Visit        Respiratory    Mild intermittent asthma without  complication - Primary    Relevant Medications    albuterol (PROVENTIL HFA;VENTOLIN HFA) 108 (90 Base) MCG/ACT inhaler    Other Relevant Orders    Ambulatory Referral to Allergy (Completed)    Allergic rhinitis       Other    16 weeks gestation of pregnancy      Other Visit Diagnoses     Exacerbation of persistent asthma, unspecified asthma severity        Relevant Medications    albuterol (PROVENTIL HFA;VENTOLIN HFA) 108 (90 Base) MCG/ACT inhaler    High risk medication use        Relevant Orders    Urine Drug Screen - Urine, Clean Catch          Current Outpatient Prescriptions:   •  albuterol (PROVENTIL HFA;VENTOLIN HFA) 108 (90 Base) MCG/ACT inhaler, Inhale 2 puffs Every 4 (Four) Hours As Needed for Wheezing. 1-2 puffs every 4 hours as needed, Disp: 1 inhaler, Rfl: 5  •  cetirizine (zyrTEC) 10 MG tablet, Take 1 tablet by mouth Daily., Disp: 30 tablet, Rfl: 5  •  montelukast (SINGULAIR) 10 MG tablet, Take 1 tablet by mouth Every Night., Disp: 30 tablet, Rfl: 5  •  Prenatal Vit-Fe Fumarate-FA (PRENATAL VITAMIN 27-0.8) 27-0.8 MG tablet tablet, Take 1 tablet by mouth Daily., Disp: 30 tablet, Rfl: 5  •  Tiotropium Bromide Monohydrate (SPIRIVA RESPIMAT) 1.25 MCG/ACT aerosol solution inhaler, Inhale 2 puffs Daily., Disp: 1 inhaler, Rfl: 5  •  guaifenesin-codeine (CHERATUSSIN AC) 100-10 MG/5ML liquid, Take 5 mL by mouth 3 (Three) Times a Day As Needed for Cough., Disp: 180 mL, Rfl: 0    Obtain UDS.   Request Arturo.  Patient has been instructed and counseled regarding opioid misuse and risk of addiction.  We have discussed proper storage and disposal of controlled medication.  Asthma precautions reviewed and discussed.  I have discussed diagnosis in detail today allowing time for questions and answers. Pt is aware of reasons to seek urgent or emergent medical care as well as reasons to return to the clinic for evaluation. Possible side effects, interactions and progression of symptoms discussed as well. Pt / family  states understanding.   Emotional support and active listening provided.   Remain under the care of OB/GYN  Pregnancy precautions reviewed and discussed.   Refer to allergy and asthma for evaluation.  Order for rescue inhaler provided.   RTC 2-5 days if not improved, sooner if condition worsens/changes. Symptomatic care advised as well as reasons for urgent or emergent care. Pt / family state understanding.                This document has been electronically signed by:  NABEEL Hill, NP-C

## 2018-02-13 LAB — EXTERNAL GTT 1 HOUR: 94

## 2018-02-22 ENCOUNTER — OFFICE VISIT (OUTPATIENT)
Dept: FAMILY MEDICINE CLINIC | Facility: CLINIC | Age: 33
End: 2018-02-22

## 2018-02-22 VITALS
DIASTOLIC BLOOD PRESSURE: 80 MMHG | WEIGHT: 171 LBS | BODY MASS INDEX: 29.19 KG/M2 | HEART RATE: 89 BPM | HEIGHT: 64 IN | SYSTOLIC BLOOD PRESSURE: 132 MMHG | OXYGEN SATURATION: 98 % | TEMPERATURE: 97.9 F

## 2018-02-22 DIAGNOSIS — R00.2 INTERMITTENT PALPITATIONS: ICD-10-CM

## 2018-02-22 DIAGNOSIS — M62.830 MUSCLE SPASM OF BACK: ICD-10-CM
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
## 2018-02-22 DIAGNOSIS — J45.40 MODERATE PERSISTENT ASTHMA WITHOUT COMPLICATION: Primary | ICD-10-CM
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
## 2018-02-22 DIAGNOSIS — R53.82 CHRONIC FATIGUE: ICD-10-CM

## 2018-02-22 DIAGNOSIS — J30.89 OTHER ALLERGIC RHINITIS: ICD-10-CM

## 2018-02-22 PROCEDURE — 99214 OFFICE O/P EST MOD 30 MIN: CPT | Performed by: NURSE PRACTITIONER

## 2018-02-22 RX ORDER — MONTELUKAST SODIUM 10 MG/1
10 TABLET ORAL NIGHTLY
Qty: 30 TABLET | Refills: 5 | Status: SHIPPED | OUTPATIENT
Start: 2018-02-22 | End: 2018-08-22 | Stop reason: SDUPTHER

## 2018-02-22 RX ORDER — CETIRIZINE HYDROCHLORIDE 10 MG/1
10 TABLET ORAL DAILY
Qty: 30 TABLET | Refills: 5 | Status: SHIPPED | OUTPATIENT
Start: 2018-02-22 | End: 2018-08-22 | Stop reason: SDUPTHER

## 2018-02-22 RX ORDER — RANITIDINE 150 MG/1
150 TABLET ORAL NIGHTLY
Qty: 60 TABLET | Refills: 5 | Status: SHIPPED | OUTPATIENT
Start: 2018-02-22 | End: 2019-06-13

## 2018-02-22 RX ORDER — PRENATAL VIT/IRON FUM/FOLIC AC 27MG-0.8MG
1 TABLET ORAL DAILY
Qty: 30 TABLET | Refills: 5 | Status: SHIPPED | OUTPATIENT
Start: 2018-02-22 | End: 2020-02-13

## 2018-02-22 RX ORDER — ALBUTEROL SULFATE 90 UG/1
2 AEROSOL, METERED RESPIRATORY (INHALATION) EVERY 4 HOURS PRN
Qty: 1 INHALER | Refills: 5 | Status: ON HOLD | OUTPATIENT
Start: 2018-02-22 | End: 2018-05-04

## 2018-02-22 NOTE — PROGRESS NOTES
Subjective   Zhanna Veras is a 32 y.o. female.     Chief Complaint   Patient presents with   • Follow-up       History of Present Illness       28 weeks pregnant with her first baby girl and fourth child. She Has moderate persistent asthma without complication.  Patient has stopped her spree during pregnancy.  She was scheduled for allergy and asthma consult which has been postponed per provider as allergy testing cannot be performed while she is pregnant.  She reports that her symptoms are stable at this time.    GERD-stable with Zantac.    Allergic rhinitis/asthma-no recent exacerbation with current medication regimen.  She is currently receiving Singulair and Zyrtec.  She does have a rescue inhaler which she uses less often than weekly.    Palpitations-patient reports that prior to pregnancy she was having episodes palpitations.  She was referred to cardiology but was unable to keep that appointment as she lost her health insurance.  She reports that she at times continues to have palpitations.  This is not increased with activity.  She would like to have a cardiology consult while she has insurance.      The following portions of the patient's history were reviewed and updated as appropriate: allergies, current medications, past family history, past medical history, past social history, past surgical history and problem list.    Review of Systems   Constitutional: Positive for fatigue. Negative for chills, diaphoresis, fever and unexpected weight change.   HENT: Positive for congestion.    Eyes: Negative.    Respiratory: Positive for shortness of breath. Negative for cough, chest tightness and wheezing.    Cardiovascular: Positive for leg swelling (feet in the evenings ).   Gastrointestinal: Positive for constipation (at times from her iron pills, extra fluid is helping ). Negative for abdominal pain, nausea and vomiting.   Endocrine: Positive for heat intolerance.   Genitourinary: Positive for frequency.  "  Musculoskeletal: Positive for back pain (sciatica worsening during pregnancy, she is scheduled to see a chiropractor ). Negative for neck stiffness.   Skin: Negative.    Allergic/Immunologic: Positive for environmental allergies.   Neurological: Negative.    Hematological: Negative.    Psychiatric/Behavioral: Negative.  Negative for dysphoric mood, self-injury, sleep disturbance and suicidal ideas. The patient is not nervous/anxious.        Objective     /80  Pulse 89  Temp 97.9 °F (36.6 °C) (Tympanic)   Ht 162.6 cm (64\")  Wt 77.6 kg (171 lb)  LMP 08/13/2017  SpO2 98%  BMI 29.35 kg/m2  Lab on 09/06/2017   Component Date Value Ref Range Status   • 25 Hydroxy, Vitamin D 09/06/2017 28.0  ng/mL Final   • Total Cholesterol 09/06/2017 162  0 - 200 mg/dL Final   • Triglycerides 09/06/2017 62  0 - 150 mg/dL Final   • HDL Cholesterol 09/06/2017 38* 60 - 100 mg/dL Final   • VLDL Cholesterol 09/06/2017 12.4  mg/dL Final   • LDL Cholesterol  09/06/2017 112* 0 - 100 mg/dL Final   • Vitamin B-12 09/06/2017 791  211 - 911 pg/mL Final   • Glucose 09/06/2017 93  70 - 110 mg/dL Final   • BUN 09/06/2017 9  7 - 21 mg/dL Final   • Creatinine 09/06/2017 0.60  0.43 - 1.29 mg/dL Final   • eGFR Non  Am 09/06/2017 117  >60 mL/min/1.73 Final   • eGFR African Am 09/06/2017 141  >60 mL/min/1.73 Final   • BUN/Creatinine Ratio 09/06/2017 15.0  7.0 - 25.0 Final   • Sodium 09/06/2017 138  135 - 153 mmol/L Final   • Potassium 09/06/2017 4.0  3.5 - 5.3 mmol/L Final   • Chloride 09/06/2017 108  99 - 112 mmol/L Final   • Total CO2 09/06/2017 24.2* 24.3 - 31.9 mmol/L Final   • Calcium 09/06/2017 9.6  7.7 - 10.0 mg/dL Final   • Total Protein 09/06/2017 7.6  6.0 - 8.0 g/dL Final   • Albumin 09/06/2017 4.70  3.50 - 5.00 g/dL Final   • Globulin 09/06/2017 2.9  gm/dL Final   • A/G Ratio 09/06/2017 1.6  1.5 - 2.5 g/dL Final   • Total Bilirubin 09/06/2017 0.3  0.2 - 1.8 mg/dL Final   • Alkaline Phosphatase 09/06/2017 60  35 - 104 U/L " Final   • AST (SGOT) 09/06/2017 16  10 - 30 U/L Final   • ALT (SGPT) 09/06/2017 12  10 - 36 U/L Final   • TSH 09/06/2017 0.954  0.550 - 4.780 mIU/mL Final   • Hemoglobin A1C 09/06/2017 5.10  4.50 - 5.70 % Final   • WBC 09/06/2017 8.85  4.50 - 12.50 10*3/mm3 Final   • RBC 09/06/2017 4.47  4.20 - 5.40 10*6/mm3 Final   • Hemoglobin 09/06/2017 12.3  12.0 - 16.0 g/dL Final   • Hematocrit 09/06/2017 39.2  37.0 - 47.0 % Final   • MCV 09/06/2017 87.7  80.0 - 94.0 fL Final   • MCH 09/06/2017 27.5  27.0 - 33.0 pg Final   • MCHC 09/06/2017 31.4* 33.0 - 37.0 g/dL Final   • RDW 09/06/2017 13.4  11.5 - 14.5 % Final   • Platelets 09/06/2017 286  130 - 400 10*3/mm3 Final   • Neutrophil Rel % 09/06/2017 74.9* 30.0 - 70.0 % Final   • Lymphocyte Rel % 09/06/2017 19.8* 21.0 - 51.0 % Final   • Monocyte Rel % 09/06/2017 4.7  0.0 - 10.0 % Final   • Eosinophil Rel % 09/06/2017 0.2  0.0 - 5.0 % Final   • Basophil Rel % 09/06/2017 0.2  0.0 - 2.0 % Final   • Neutrophils Absolute 09/06/2017 6.62* 1.40 - 6.50 10*3/mm3 Final   • Lymphocytes Absolute 09/06/2017 1.75  1.00 - 3.00 10*3/mm3 Final   • Monocytes Absolute 09/06/2017 0.42  0.10 - 0.90 10*3/mm3 Final   • Eosinophils Absolute 09/06/2017 0.02  0.00 - 0.70 10*3/mm3 Final   • Basophils Absolute 09/06/2017 0.02  0.00 - 0.30 10*3/mm3 Final   • Immature Granulocyte Rel % 09/06/2017 0.2  0.0 - 0.5 % Final   • Immature Grans Absolute 09/06/2017 0.02  0.00 - 0.03 10*3/mm3 Final       Physical Exam   Constitutional: She is oriented to person, place, and time. She appears well-developed and well-nourished. No distress.   HENT:   Head: Atraumatic.   Right Ear: External ear normal.   Left Ear: External ear normal.   Nose: Nose normal.   Mouth/Throat: Oropharynx is clear and moist. No oropharyngeal exudate.   Eyes: Pupils are equal, round, and reactive to light.   Neck: Normal range of motion. Neck supple. No thyromegaly present.   Cardiovascular: Normal rate, regular rhythm and normal heart sounds.     No murmur heard.  Pulmonary/Chest: Effort normal and breath sounds normal. No respiratory distress. She has no wheezes. She exhibits no tenderness.   Abdominal: Soft. Bowel sounds are normal. There is no tenderness.   Visibly pregnant. Fetal heart rate audible with stethoscope.    Musculoskeletal:        Lumbar back: She exhibits tenderness.   Lymphadenopathy:     She has no cervical adenopathy.   Neurological: She is oriented to person, place, and time.   Skin: Skin is warm and dry. No rash noted. She is not diaphoretic.   Psychiatric: She has a normal mood and affect. Her speech is normal and behavior is normal. Judgment and thought content normal. Cognition and memory are normal.       Assessment/Plan     Problem List Items Addressed This Visit        Cardiovascular and Mediastinum    Intermittent palpitations    Relevant Orders    Ambulatory Referral to Cardiology       Respiratory    Moderate persistent asthma without complication - Primary    Relevant Medications    montelukast (SINGULAIR) 10 MG tablet    cetirizine (zyrTEC) 10 MG tablet    raNITIdine (ZANTAC) 150 MG tablet    albuterol (PROVENTIL HFA;VENTOLIN HFA) 108 (90 Base) MCG/ACT inhaler       Other    Chronic fatigue    Relevant Medications    IRON PO    Prenatal Vit-Fe Fumarate-FA (PRENATAL VITAMIN 27-0.8) 27-0.8 MG tablet tablet      Other Visit Diagnoses     Other allergic rhinitis        refill routine medications  allergy precautions      Relevant Medications    montelukast (SINGULAIR) 10 MG tablet    cetirizine (zyrTEC) 10 MG tablet            Current Outpatient Prescriptions:   •  albuterol (PROVENTIL HFA;VENTOLIN HFA) 108 (90 Base) MCG/ACT inhaler, Inhale 2 puffs Every 4 (Four) Hours As Needed for Wheezing. 1-2 puffs every 4 hours as needed, Disp: 1 inhaler, Rfl: 5  •  cetirizine (zyrTEC) 10 MG tablet, Take 1 tablet by mouth Daily., Disp: 30 tablet, Rfl: 5  •  IRON PO, Take  by mouth., Disp: , Rfl:   •  montelukast (SINGULAIR) 10 MG tablet,  Take 1 tablet by mouth Every Night., Disp: 30 tablet, Rfl: 5  •  Prenatal Vit-Fe Fumarate-FA (PRENATAL VITAMIN 27-0.8) 27-0.8 MG tablet tablet, Take 1 tablet by mouth Daily., Disp: 30 tablet, Rfl: 5  •  raNITIdine (ZANTAC) 150 MG tablet, Take 1 tablet by mouth Every Night., Disp: 60 tablet, Rfl: 5    Remain under the care of of OB/GYN.  Continue prenatal vitamins and current medication regimen.  She has brought in a copy of her recent laboratory labs for review.  We have reviewed and discussed lab results.  Patient will be referred to cardiology for evaluation.  I have discussed diagnosis in detail today allowing time for questions and answers. Pt is aware of reasons to seek urgent or emergent medical care as well as reasons to return to the clinic for evaluation. Possible side effects, interactions and progression of symptoms discussed as well. Pt / family states understanding.   Routine follow-up every 6 months.         This document has been electronically signed by:  NABEEL Hill, NP-C

## 2018-04-04 ENCOUNTER — TELEPHONE (OUTPATIENT)
Dept: CARDIOLOGY | Facility: CLINIC | Age: 33
End: 2018-04-04

## 2018-04-05 ENCOUNTER — OFFICE VISIT (OUTPATIENT)
Dept: CARDIOLOGY | Facility: CLINIC | Age: 33
End: 2018-04-05

## 2018-04-05 VITALS
DIASTOLIC BLOOD PRESSURE: 64 MMHG | BODY MASS INDEX: 31.07 KG/M2 | RESPIRATION RATE: 16 BRPM | HEIGHT: 64 IN | OXYGEN SATURATION: 99 % | HEART RATE: 80 BPM | WEIGHT: 182 LBS | SYSTOLIC BLOOD PRESSURE: 101 MMHG

## 2018-04-05 DIAGNOSIS — Z3A.33 33 WEEKS GESTATION OF PREGNANCY: ICD-10-CM

## 2018-04-05 DIAGNOSIS — R07.2 PRECORDIAL PAIN: Primary | ICD-10-CM

## 2018-04-05 PROBLEM — Z3A.16 16 WEEKS GESTATION OF PREGNANCY: Status: RESOLVED | Noted: 2017-11-30 | Resolved: 2018-04-05

## 2018-04-05 PROCEDURE — 99204 OFFICE O/P NEW MOD 45 MIN: CPT | Performed by: INTERNAL MEDICINE

## 2018-04-05 PROCEDURE — 93000 ELECTROCARDIOGRAM COMPLETE: CPT | Performed by: INTERNAL MEDICINE

## 2018-04-05 NOTE — PROGRESS NOTES
NABEEL Jenkins  Zhanna Veras  1985 04/05/2018    Patient Active Problem List   Diagnosis   • Moderate persistent asthma without complication   • Allergic rhinitis   • Pain of finger of right hand   • Vitamin D deficiency   • Generalized anxiety disorder   • Restless leg syndrome   • Acute bilateral low back pain with left-sided sciatica   • Muscle spasm of back   • Chronic fatigue   • Intermittent palpitations   • Precordial pain   • 33 weeks gestation of pregnancy       Dear NABEEL Jenkins:    Subjective     Zhanna Veras is a 32 y.o. female with the problems as listed above, presents    Chief complaint: Recurrent chest pains.    History of Present Illness: is a pleasant 30-year-old  female who is 33 weeks pregnant, presents with complains having recurrent chest pains for the last couple of years.  She describes these episodes as being felt as sudden onset tightness on the left side of her chest underneath her left breast with no relation to exertion andSeemed to correct random and usually last for few minutes and resolve spontaneously.  When she has the pain they seem to get worse with deep breathing.  These pains are mostly localized.  There is some associated shortness of breath but no sweating.  These pains are of moderate intensity.  She has very few risk factors for coronary artery disease.  She is a nonsmoker, nondiabetic, nonhypertensive and has no history of known dyslipidemia.  She has no family history of premature coronary artery disease either.    Allergies   Allergen Reactions   • Penicillins        Current Outpatient Prescriptions:   •  albuterol (PROVENTIL HFA;VENTOLIN HFA) 108 (90 Base) MCG/ACT inhaler, Inhale 2 puffs Every 4 (Four) Hours As Needed for Wheezing. 1-2 puffs every 4 hours as needed, Disp: 1 inhaler, Rfl: 5  •  cetirizine (zyrTEC) 10 MG tablet, Take 1 tablet by mouth Daily., Disp: 30 tablet, Rfl: 5  •  IRON PO, Take  by  "mouth., Disp: , Rfl:   •  montelukast (SINGULAIR) 10 MG tablet, Take 1 tablet by mouth Every Night., Disp: 30 tablet, Rfl: 5  •  Prenatal Vit-Fe Fumarate-FA (PRENATAL VITAMIN 27-0.8) 27-0.8 MG tablet tablet, Take 1 tablet by mouth Daily., Disp: 30 tablet, Rfl: 5  •  raNITIdine (ZANTAC) 150 MG tablet, Take 1 tablet by mouth Every Night., Disp: 60 tablet, Rfl: 5    Past Medical History:   Diagnosis Date   • Abdominal pain    • Allergic rhinitis    • Arthritis     osteo   • Cough    • Depression    • Malaise and fatigue    • Respiratory abnormality    • Vitamin D deficiency      Past Surgical History:   Procedure Laterality Date   • LAPAROSCOPIC ASSISTED VAGINAL HYSTERECTOMY, UTEROSACRAL SUSPENSION     • WISDOM TOOTH EXTRACTION       Family History   Problem Relation Age of Onset   • Asthma Mother    • COPD Mother    • Diabetes Mother    • Hypertension Mother    • Arthritis Paternal Grandmother    • Cancer Paternal Grandmother    • COPD Paternal Grandmother    • Stroke Paternal Grandmother      Social History   Substance Use Topics   • Smoking status: Former Smoker     Types: Cigarettes     Quit date: 2006   • Smokeless tobacco: Never Used   • Alcohol use No       Review of Systems   Constitution: Positive for malaise/fatigue.   Eyes: Negative.         Wears glasses     Cardiovascular: Positive for chest pain (tight pressure with stabbing pain.last episode x 2wks ago. 3yrs durationf), dyspnea on exertion, irregular heartbeat (fast HR), leg swelling (due to pregnancy), near-syncope (\"all my life\") and palpitations. Negative for orthopnea.   Respiratory: Positive for shortness of breath. Negative for cough and wheezing.            Asthma   Endocrine: Negative.    Hematologic/Lymphatic: Negative.    Gastrointestinal: Positive for heartburn. Negative for abdominal pain and change in bowel habit.   Genitourinary: Negative for dysuria.   Neurological: Positive for dizziness (since pregnancy), headaches and light-headedness. " "  Psychiatric/Behavioral: Positive for memory loss (confusion). The patient has insomnia.        Objective   Blood pressure 101/64, pulse 80, resp. rate 16, height 162.6 cm (64\"), weight 82.6 kg (182 lb), last menstrual period 08/13/2017, SpO2 99 %.  Body mass index is 31.24 kg/m².        Physical Exam   Constitutional: She is oriented to person, place, and time. She appears well-developed and well-nourished.   HENT:   Mouth/Throat: Oropharynx is clear and moist.   Eyes: EOM are normal. Pupils are equal, round, and reactive to light.   Neck: Neck supple. No JVD present. No tracheal deviation present. No thyromegaly present.   Cardiovascular: Normal rate, regular rhythm, S1 normal and S2 normal.  Exam reveals no gallop, no S3, no S4 and no friction rub.    No murmur heard.  Pulmonary/Chest: Effort normal and breath sounds normal.   Abdominal: Soft. Bowel sounds are normal. She exhibits no mass. There is no tenderness.   Musculoskeletal: Normal range of motion. She exhibits no edema.   Lymphadenopathy:     She has no cervical adenopathy.   Neurological: She is alert and oriented to person, place, and time.   Skin: Skin is warm and dry. No rash noted.   Psychiatric: She has a normal mood and affect.       Lab Results   Component Value Date     09/06/2017    K 4.0 09/06/2017     09/06/2017    CO2 24.2 (L) 09/06/2017    BUN 9 09/06/2017    CREATININE 0.60 09/06/2017    CALCIUM 9.6 09/06/2017    AST 16 09/06/2017    ALT 12 09/06/2017    ALKPHOS 60 09/06/2017    LABIL2 1.6 09/06/2017     No results found for: CKTOTAL  Lab Results   Component Value Date    WBC 8.85 09/06/2017    HGB 12.3 09/06/2017    HCT 39.2 09/06/2017     09/06/2017     No results found for: INR  No results found for: MG  Lab Results   Component Value Date    TSH 0.954 09/06/2017    CHLPL 162 09/06/2017    TRIG 62 09/06/2017    HDL 38 (L) 09/06/2017     (H) 09/06/2017       ECG 12 Lead  Date/Time: 4/5/2018 12:53 PM  Performed " by: HARDY AMIN  Authorized by: HARDY AMIN               Assessment/Plan :  Diagnoses and all orders for this visit:    Precordial painWith some typical and some atypical features for angina, most probably noncardiac.  33 weeks gestation of pregnancy.    Recommendations:    1. We will evaluate her chest pains further with an Echo Doppler study.  2. She is not a candidate to do a stress test at this point in time due to her advanced stage of pregnancy.  3. Follow-up in 3 weeks.    Return in about 3 weeks (around 4/26/2018).    As always, I appreciate very much the opportunity to participate in the cardiovascular care of your patients.      With Best Regards,    Hardy Amin MD, FACC

## 2018-04-11 ENCOUNTER — HOSPITAL ENCOUNTER (OUTPATIENT)
Dept: CARDIOLOGY | Facility: HOSPITAL | Age: 33
Discharge: HOME | End: 2018-04-11
Attending: INTERNAL MEDICINE | Admitting: INTERNAL MEDICINE

## 2018-04-11 DIAGNOSIS — R07.2 PRECORDIAL PAIN: ICD-10-CM

## 2018-04-11 PROCEDURE — 93306 TTE W/DOPPLER COMPLETE: CPT

## 2018-04-11 PROCEDURE — 93306 TTE W/DOPPLER COMPLETE: CPT | Performed by: INTERNAL MEDICINE

## 2018-04-13 LAB
BH CV ECHO MEAS - % IVS THICK: -14.9 %
BH CV ECHO MEAS - % LVPW THICK: 14 %
BH CV ECHO MEAS - ACS: 2.4 CM
BH CV ECHO MEAS - AO MAX PG: 13 MMHG
BH CV ECHO MEAS - AO MEAN PG: 6.8 MMHG
BH CV ECHO MEAS - AO ROOT AREA (BSA CORRECTED): 1.6
BH CV ECHO MEAS - AO ROOT AREA: 7 CM^2
BH CV ECHO MEAS - AO ROOT DIAM: 3 CM
BH CV ECHO MEAS - AO V2 MAX: 180.5 CM/SEC
BH CV ECHO MEAS - AO V2 MEAN: 119.8 CM/SEC
BH CV ECHO MEAS - AO V2 VTI: 40.9 CM
BH CV ECHO MEAS - BSA(HAYCOCK): 2 M^2
BH CV ECHO MEAS - BSA: 1.9 M^2
BH CV ECHO MEAS - BZI_BMI: 31.2 KILOGRAMS/M^2
BH CV ECHO MEAS - BZI_METRIC_HEIGHT: 162.6 CM
BH CV ECHO MEAS - BZI_METRIC_WEIGHT: 82.6 KG
BH CV ECHO MEAS - CONTRAST EF 4CH: 70.6 ML/M^2
BH CV ECHO MEAS - EDV(CUBED): 71.6 ML
BH CV ECHO MEAS - EDV(MOD-SP4): 51 ML
BH CV ECHO MEAS - EDV(TEICH): 76.5 ML
BH CV ECHO MEAS - EF(CUBED): 59.8 %
BH CV ECHO MEAS - EF(TEICH): 51.8 %
BH CV ECHO MEAS - ESV(CUBED): 28.8 ML
BH CV ECHO MEAS - ESV(MOD-SP4): 15 ML
BH CV ECHO MEAS - ESV(TEICH): 36.9 ML
BH CV ECHO MEAS - FS: 26.2 %
BH CV ECHO MEAS - IVS/LVPW: 1.1
BH CV ECHO MEAS - IVSD: 1.2 CM
BH CV ECHO MEAS - IVSS: 1 CM
BH CV ECHO MEAS - LA DIMENSION: 3.2 CM
BH CV ECHO MEAS - LA/AO: 1.1
BH CV ECHO MEAS - LV DIASTOLIC VOL/BSA (35-75): 27.1 ML/M^2
BH CV ECHO MEAS - LV MASS(C)D: 165.4 GRAMS
BH CV ECHO MEAS - LV MASS(C)DI: 88 GRAMS/M^2
BH CV ECHO MEAS - LV MASS(C)S: 103.9 GRAMS
BH CV ECHO MEAS - LV MASS(C)SI: 55.3 GRAMS/M^2
BH CV ECHO MEAS - LV SYSTOLIC VOL/BSA (12-30): 8 ML/M^2
BH CV ECHO MEAS - LVIDD: 4.2 CM
BH CV ECHO MEAS - LVIDS: 3.1 CM
BH CV ECHO MEAS - LVLD AP4: 6.6 CM
BH CV ECHO MEAS - LVLS AP4: 5.6 CM
BH CV ECHO MEAS - LVOT AREA (M): 2.8 CM^2
BH CV ECHO MEAS - LVOT AREA: 2.7 CM^2
BH CV ECHO MEAS - LVOT DIAM: 1.9 CM
BH CV ECHO MEAS - LVPWD: 1.1 CM
BH CV ECHO MEAS - LVPWS: 1.3 CM
BH CV ECHO MEAS - MV A MAX VEL: 56.8 CM/SEC
BH CV ECHO MEAS - MV E MAX VEL: 86.9 CM/SEC
BH CV ECHO MEAS - MV E/A: 1.5
BH CV ECHO MEAS - PA ACC SLOPE: 893.2 CM/SEC^2
BH CV ECHO MEAS - PA ACC TIME: 0.14 SEC
BH CV ECHO MEAS - PA PR(ACCEL): 14 MMHG
BH CV ECHO MEAS - RAP SYSTOLE: 10 MMHG
BH CV ECHO MEAS - RVDD: 1.8 CM
BH CV ECHO MEAS - RVSP: 43.4 MMHG
BH CV ECHO MEAS - SI(AO): 151.6 ML/M^2
BH CV ECHO MEAS - SI(CUBED): 22.8 ML/M^2
BH CV ECHO MEAS - SI(MOD-SP4): 19.2 ML/M^2
BH CV ECHO MEAS - SI(TEICH): 21.1 ML/M^2
BH CV ECHO MEAS - SV(AO): 284.8 ML
BH CV ECHO MEAS - SV(CUBED): 42.8 ML
BH CV ECHO MEAS - SV(MOD-SP4): 36 ML
BH CV ECHO MEAS - SV(TEICH): 39.6 ML
BH CV ECHO MEAS - TR MAX VEL: 289.1 CM/SEC
MAXIMAL PREDICTED HEART RATE: 188 BPM
STRESS TARGET HR: 160 BPM

## 2018-04-18 NOTE — TELEPHONE ENCOUNTER
Date/Time Type Contact Phone/Fax           04/04/2018 10:09 AM Phone (Incoming) Zhanna Veras (Self)     By iCndy Rodríguez

## 2018-04-24 LAB
EXTERNAL CHLAMYDIA SCREEN: NORMAL
EXTERNAL GONORRHEA SCREEN: NORMAL
EXTERNAL GROUP B STREP ANTIGEN: NEGATIVE

## 2018-05-02 ENCOUNTER — HOSPITAL ENCOUNTER (OUTPATIENT)
Facility: HOSPITAL | Age: 33
Discharge: HOME | End: 2018-05-02
Attending: OBSTETRICS & GYNECOLOGY | Admitting: OBSTETRICS & GYNECOLOGY

## 2018-05-02 VITALS
HEIGHT: 64 IN | BODY MASS INDEX: 31.41 KG/M2 | RESPIRATION RATE: 18 BRPM | SYSTOLIC BLOOD PRESSURE: 118 MMHG | WEIGHT: 184 LBS | DIASTOLIC BLOOD PRESSURE: 66 MMHG | TEMPERATURE: 98.2 F | HEART RATE: 88 BPM

## 2018-05-02 PROBLEM — M54.9 BACK PAIN AFFECTING PREGNANCY: Status: ACTIVE | Noted: 2018-05-02

## 2018-05-02 PROBLEM — O99.891 BACK PAIN AFFECTING PREGNANCY: Status: ACTIVE | Noted: 2018-05-02

## 2018-05-02 PROCEDURE — 59025 FETAL NON-STRESS TEST: CPT

## 2018-05-02 PROCEDURE — G0463 HOSPITAL OUTPT CLINIC VISIT: HCPCS

## 2018-05-02 NOTE — NON STRESS TEST
Zhanna Veras, lacy  at 37w3d with an LETY of 2018, by Last Menstrual Period, was seen at [facility] LABOR DELIVERY for a nonstress test.    Chief Complaint   Patient presents with   • Back Pain     HAS BEEN HAVING BACK PAIN FOR A WHILE, BUT WORSENED TODAY       Interpretation A  Nonstress Test Interpretation A: Reactive (18 1800 : Joann Skinner RN)  Comments A: VERIFIED BY GUSTAVO THOMPSON RN (18 1800 : Joann Skinner RN)  FETAL NONSTRESS TEST REPORT      Gestational age: As recorded in hospital chart    Indication: See hospital chart    Accelerations: Present    Baseline fetal heart rate: 130    Decelerations: Few variables present    Conclusion: Reactive

## 2018-05-04 ENCOUNTER — HOSPITAL ENCOUNTER (OUTPATIENT)
Facility: HOSPITAL | Age: 33
Setting detail: OBSERVATION
Discharge: HOME | End: 2018-05-05
Attending: OBSTETRICS & GYNECOLOGY | Admitting: OBSTETRICS & GYNECOLOGY

## 2018-05-04 PROBLEM — N39.0 UTI (URINARY TRACT INFECTION): Status: ACTIVE | Noted: 2018-05-04

## 2018-05-04 PROBLEM — R50.9 FEVER: Status: ACTIVE | Noted: 2018-05-04

## 2018-05-04 LAB
ANION GAP SERPL CALCULATED.3IONS-SCNC: 6.3 MMOL/L (ref 3.6–11.2)
BACTERIA UR QL AUTO: ABNORMAL /HPF
BASOPHILS # BLD AUTO: 0.01 10*3/MM3 (ref 0–0.3)
BASOPHILS NFR BLD AUTO: 0.1 % (ref 0–2)
BILIRUB UR QL STRIP: NEGATIVE
BUN BLD-MCNC: <5 MG/DL (ref 7–21)
BUN/CREAT SERPL: ABNORMAL (ref 7–25)
CALCIUM SPEC-SCNC: 8.3 MG/DL (ref 7.7–10)
CHLORIDE SERPL-SCNC: 105 MMOL/L (ref 99–112)
CLARITY UR: ABNORMAL
CO2 SERPL-SCNC: 23.7 MMOL/L (ref 24.3–31.9)
COLOR UR: ABNORMAL
CREAT BLD-MCNC: 0.37 MG/DL (ref 0.43–1.29)
DEPRECATED RDW RBC AUTO: 48.3 FL (ref 37–54)
EOSINOPHIL # BLD AUTO: 0.03 10*3/MM3 (ref 0–0.7)
EOSINOPHIL NFR BLD AUTO: 0.3 % (ref 0–5)
ERYTHROCYTE [DISTWIDTH] IN BLOOD BY AUTOMATED COUNT: 14.9 % (ref 11.5–14.5)
GFR SERPL CREATININE-BSD FRML MDRD: >150 ML/MIN/1.73
GLUCOSE BLD-MCNC: 96 MG/DL (ref 70–110)
GLUCOSE UR STRIP-MCNC: NEGATIVE MG/DL
HCT VFR BLD AUTO: 33.9 % (ref 37–47)
HGB BLD-MCNC: 11.3 G/DL (ref 12–16)
HGB UR QL STRIP.AUTO: ABNORMAL
HYALINE CASTS UR QL AUTO: ABNORMAL /LPF
IMM GRANULOCYTES # BLD: 0.04 10*3/MM3 (ref 0–0.03)
IMM GRANULOCYTES NFR BLD: 0.4 % (ref 0–0.5)
KETONES UR QL STRIP: NEGATIVE
LEUKOCYTE ESTERASE UR QL STRIP.AUTO: ABNORMAL
LYMPHOCYTES # BLD AUTO: 0.77 10*3/MM3 (ref 1–3)
LYMPHOCYTES NFR BLD AUTO: 8 % (ref 21–51)
MCH RBC QN AUTO: 31.1 PG (ref 27–33)
MCHC RBC AUTO-ENTMCNC: 33.3 G/DL (ref 33–37)
MCV RBC AUTO: 93.4 FL (ref 80–94)
MONOCYTES # BLD AUTO: 0.74 10*3/MM3 (ref 0.1–0.9)
MONOCYTES NFR BLD AUTO: 7.7 % (ref 0–10)
NEUTROPHILS # BLD AUTO: 8.04 10*3/MM3 (ref 1.4–6.5)
NEUTROPHILS NFR BLD AUTO: 83.5 % (ref 30–70)
NITRITE UR QL STRIP: POSITIVE
OSMOLALITY SERPL CALC.SUM OF ELEC: NORMAL MOSM/KG (ref 273–305)
PH UR STRIP.AUTO: 6.5 [PH] (ref 5–8)
PLATELET # BLD AUTO: 134 10*3/MM3 (ref 130–400)
PMV BLD AUTO: 11 FL (ref 6–10)
POTASSIUM BLD-SCNC: 3.1 MMOL/L (ref 3.5–5.3)
PROT UR QL STRIP: ABNORMAL
RBC # BLD AUTO: 3.63 10*6/MM3 (ref 4.2–5.4)
RBC # UR: ABNORMAL /HPF
REF LAB TEST METHOD: ABNORMAL
SODIUM BLD-SCNC: 135 MMOL/L (ref 135–153)
SP GR UR STRIP: 1.02 (ref 1–1.03)
SQUAMOUS #/AREA URNS HPF: ABNORMAL /HPF
UROBILINOGEN UR QL STRIP: ABNORMAL
WBC NRBC COR # BLD: 9.63 10*3/MM3 (ref 4.5–12.5)
WBC UR QL AUTO: ABNORMAL /HPF

## 2018-05-04 PROCEDURE — 87086 URINE CULTURE/COLONY COUNT: CPT | Performed by: OBSTETRICS & GYNECOLOGY

## 2018-05-04 PROCEDURE — 96361 HYDRATE IV INFUSION ADD-ON: CPT

## 2018-05-04 PROCEDURE — 25010000002 CEFOXITIN: Performed by: OBSTETRICS & GYNECOLOGY

## 2018-05-04 PROCEDURE — 85025 COMPLETE CBC W/AUTO DIFF WBC: CPT | Performed by: OBSTETRICS & GYNECOLOGY

## 2018-05-04 PROCEDURE — 87077 CULTURE AEROBIC IDENTIFY: CPT | Performed by: OBSTETRICS & GYNECOLOGY

## 2018-05-04 PROCEDURE — G0378 HOSPITAL OBSERVATION PER HR: HCPCS

## 2018-05-04 PROCEDURE — 59025 FETAL NON-STRESS TEST: CPT

## 2018-05-04 PROCEDURE — 87186 SC STD MICRODIL/AGAR DIL: CPT | Performed by: OBSTETRICS & GYNECOLOGY

## 2018-05-04 PROCEDURE — 80048 BASIC METABOLIC PNL TOTAL CA: CPT | Performed by: OBSTETRICS & GYNECOLOGY

## 2018-05-04 PROCEDURE — 81001 URINALYSIS AUTO W/SCOPE: CPT | Performed by: OBSTETRICS & GYNECOLOGY

## 2018-05-04 PROCEDURE — G0463 HOSPITAL OUTPT CLINIC VISIT: HCPCS

## 2018-05-04 PROCEDURE — 96360 HYDRATION IV INFUSION INIT: CPT

## 2018-05-04 PROCEDURE — 36415 COLL VENOUS BLD VENIPUNCTURE: CPT | Performed by: OBSTETRICS & GYNECOLOGY

## 2018-05-04 RX ORDER — FAMOTIDINE 20 MG/1
20 TABLET, FILM COATED ORAL 2 TIMES DAILY
Status: DISCONTINUED | OUTPATIENT
Start: 2018-05-04 | End: 2018-05-05 | Stop reason: HOSPADM

## 2018-05-04 RX ORDER — POTASSIUM CHLORIDE 750 MG/1
40 CAPSULE, EXTENDED RELEASE ORAL AS NEEDED
Status: DISCONTINUED | OUTPATIENT
Start: 2018-05-04 | End: 2018-05-05 | Stop reason: HOSPADM

## 2018-05-04 RX ORDER — MONTELUKAST SODIUM 10 MG/1
10 TABLET ORAL NIGHTLY
Status: DISCONTINUED | OUTPATIENT
Start: 2018-05-04 | End: 2018-05-05 | Stop reason: HOSPADM

## 2018-05-04 RX ORDER — CETIRIZINE HYDROCHLORIDE 10 MG/1
10 TABLET ORAL NIGHTLY
Status: DISCONTINUED | OUTPATIENT
Start: 2018-05-04 | End: 2018-05-05 | Stop reason: HOSPADM

## 2018-05-04 RX ORDER — ACETAMINOPHEN 325 MG/1
650 TABLET ORAL EVERY 4 HOURS PRN
Status: DISCONTINUED | OUTPATIENT
Start: 2018-05-04 | End: 2018-05-05 | Stop reason: HOSPADM

## 2018-05-04 RX ORDER — CEFOXITIN 2 G/1
INJECTION, POWDER, FOR SOLUTION INTRAVENOUS
Status: DISCONTINUED
Start: 2018-05-04 | End: 2018-05-04 | Stop reason: WASHOUT

## 2018-05-04 RX ORDER — SODIUM CHLORIDE, SODIUM LACTATE, POTASSIUM CHLORIDE, CALCIUM CHLORIDE 600; 310; 30; 20 MG/100ML; MG/100ML; MG/100ML; MG/100ML
INJECTION, SOLUTION INTRAVENOUS
Status: COMPLETED
Start: 2018-05-04 | End: 2018-05-04

## 2018-05-04 RX ORDER — PRENATAL VIT/IRON FUM/FOLIC AC 27MG-0.8MG
1 TABLET ORAL NIGHTLY
Status: DISCONTINUED | OUTPATIENT
Start: 2018-05-04 | End: 2018-05-05 | Stop reason: HOSPADM

## 2018-05-04 RX ORDER — SODIUM CHLORIDE, SODIUM LACTATE, POTASSIUM CHLORIDE, CALCIUM CHLORIDE 600; 310; 30; 20 MG/100ML; MG/100ML; MG/100ML; MG/100ML
150 INJECTION, SOLUTION INTRAVENOUS CONTINUOUS
Status: DISCONTINUED | OUTPATIENT
Start: 2018-05-04 | End: 2018-05-05 | Stop reason: HOSPADM

## 2018-05-04 RX ORDER — POTASSIUM CHLORIDE 20 MEQ/1
40 TABLET, EXTENDED RELEASE ORAL EVERY 4 HOURS
Status: COMPLETED | OUTPATIENT
Start: 2018-05-04 | End: 2018-05-05

## 2018-05-04 RX ADMIN — FAMOTIDINE 20 MG: 20 TABLET, FILM COATED ORAL at 21:19

## 2018-05-04 RX ADMIN — SODIUM CHLORIDE, POTASSIUM CHLORIDE, SODIUM LACTATE AND CALCIUM CHLORIDE 150 ML/HR: 600; 310; 30; 20 INJECTION, SOLUTION INTRAVENOUS at 22:38

## 2018-05-04 RX ADMIN — SODIUM CHLORIDE, POTASSIUM CHLORIDE, SODIUM LACTATE AND CALCIUM CHLORIDE 150 ML/HR: 600; 310; 30; 20 INJECTION, SOLUTION INTRAVENOUS at 17:20

## 2018-05-04 RX ADMIN — CETIRIZINE HYDROCHLORIDE 10 MG: 10 TABLET ORAL at 21:19

## 2018-05-04 RX ADMIN — ACETAMINOPHEN 650 MG: 325 TABLET ORAL at 17:28

## 2018-05-04 RX ADMIN — POTASSIUM CHLORIDE 40 MEQ: 1500 TABLET, EXTENDED RELEASE ORAL at 20:23

## 2018-05-04 RX ADMIN — ACETAMINOPHEN 650 MG: 325 TABLET ORAL at 21:17

## 2018-05-04 RX ADMIN — CEFOXITIN 2 G: 2 INJECTION, POWDER, FOR SOLUTION INTRAVENOUS at 17:40

## 2018-05-04 RX ADMIN — PRENATAL VIT W/ FE FUMARATE-FA TAB 27-0.8 MG 1 TABLET: 27-0.8 TAB at 21:21

## 2018-05-04 RX ADMIN — MONTELUKAST SODIUM 10 MG: 10 TABLET, COATED ORAL at 21:18

## 2018-05-04 NOTE — NON STRESS TEST
Zhanna Veras, lacy  at 37w5d with an LETY of 2018, by Last Menstrual Period, was seen at [facility] LABOR DELIVERY for a nonstress test.    Chief Complaint   Patient presents with   • Fever     as high as 101 since last night       Interpretation A  Nonstress Test Interpretation A: Reactive (18 1549 : Lesia Spears RN)  Comments A: confirmed with OMAR Cruz RN (18 1549 : Lesia Spears RN)

## 2018-05-05 VITALS
HEIGHT: 64 IN | TEMPERATURE: 98.2 F | DIASTOLIC BLOOD PRESSURE: 60 MMHG | RESPIRATION RATE: 18 BRPM | SYSTOLIC BLOOD PRESSURE: 109 MMHG | WEIGHT: 184 LBS | BODY MASS INDEX: 31.41 KG/M2 | HEART RATE: 100 BPM

## 2018-05-05 LAB
ANION GAP SERPL CALCULATED.3IONS-SCNC: 7.9 MMOL/L (ref 3.6–11.2)
BUN BLD-MCNC: <5 MG/DL (ref 7–21)
BUN/CREAT SERPL: ABNORMAL (ref 7–25)
CALCIUM SPEC-SCNC: 8.5 MG/DL (ref 7.7–10)
CHLORIDE SERPL-SCNC: 107 MMOL/L (ref 99–112)
CO2 SERPL-SCNC: 21.1 MMOL/L (ref 24.3–31.9)
CREAT BLD-MCNC: 0.35 MG/DL (ref 0.43–1.29)
GFR SERPL CREATININE-BSD FRML MDRD: >150 ML/MIN/1.73
GLUCOSE BLD-MCNC: 90 MG/DL (ref 70–110)
OSMOLALITY SERPL CALC.SUM OF ELEC: NORMAL MOSM/KG (ref 273–305)
POTASSIUM BLD-SCNC: 3.7 MMOL/L (ref 3.5–5.3)
SODIUM BLD-SCNC: 136 MMOL/L (ref 135–153)

## 2018-05-05 PROCEDURE — 25010000002 CEFOXITIN: Performed by: OBSTETRICS & GYNECOLOGY

## 2018-05-05 PROCEDURE — G0378 HOSPITAL OBSERVATION PER HR: HCPCS

## 2018-05-05 PROCEDURE — 59025 FETAL NON-STRESS TEST: CPT

## 2018-05-05 PROCEDURE — 96361 HYDRATE IV INFUSION ADD-ON: CPT

## 2018-05-05 PROCEDURE — 80048 BASIC METABOLIC PNL TOTAL CA: CPT | Performed by: OBSTETRICS & GYNECOLOGY

## 2018-05-05 RX ORDER — CEFDINIR 300 MG/1
300 CAPSULE ORAL 2 TIMES DAILY
Qty: 20 CAPSULE | Refills: 0 | Status: ON HOLD | OUTPATIENT
Start: 2018-05-05 | End: 2018-05-15

## 2018-05-05 RX ADMIN — CEFOXITIN 2 G: 2 INJECTION, POWDER, FOR SOLUTION INTRAVENOUS at 01:38

## 2018-05-05 RX ADMIN — FAMOTIDINE 20 MG: 20 TABLET, FILM COATED ORAL at 09:38

## 2018-05-05 RX ADMIN — POTASSIUM CHLORIDE 40 MEQ: 1500 TABLET, EXTENDED RELEASE ORAL at 04:32

## 2018-05-05 RX ADMIN — SODIUM CHLORIDE, POTASSIUM CHLORIDE, SODIUM LACTATE AND CALCIUM CHLORIDE 150 ML/HR: 600; 310; 30; 20 INJECTION, SOLUTION INTRAVENOUS at 05:38

## 2018-05-05 RX ADMIN — ACETAMINOPHEN 650 MG: 325 TABLET ORAL at 04:32

## 2018-05-05 RX ADMIN — CEFOXITIN 2 G: 2 INJECTION, POWDER, FOR SOLUTION INTRAVENOUS at 09:39

## 2018-05-05 RX ADMIN — POTASSIUM CHLORIDE 40 MEQ: 1500 TABLET, EXTENDED RELEASE ORAL at 00:28

## 2018-05-05 NOTE — DISCHARGE INSTRUCTIONS
Acute Urinary Retention, Female  Urinary retention means you are unable to pee completely or at all (empty your bladder).  Follow these instructions at home:  · Drink enough fluids to keep your pee (urine) clear or pale yellow.  · If you are sent home with a tube that drains the bladder (catheter), there will be a drainage bag attached to it. There are two types of bags. One is big that you can wear at night without having to empty it. One is smaller and needs to be emptied more often.  ¨ Keep the drainage bag emptied.  ¨ Keep the drainage bag lower than the tube.  · Only take medicine as told by your doctor.  Contact a doctor if:  · You have a low-grade fever.  · You have spasms or you are leaking pee when you have spasms.  Get help right away if:  · You have chills or a fever.  · Your catheter stops draining pee.  · Your catheter falls out.  · You have increased bleeding that does not stop after you have rested and increased the amount of fluids you had been drinking.  This information is not intended to replace advice given to you by your health care provider. Make sure you discuss any questions you have with your health care provider.  Document Released: 06/05/2009 Document Revised: 05/25/2017 Document Reviewed: 05/29/2014  [employer] Interactive Patient Education © 2017 [employer]

## 2018-05-05 NOTE — PLAN OF CARE
Problem: Urinary Tract Infection (Adult)  Intervention: Prevent/Manage Infection Progression   05/05/18 0000   Safety Interventions   Infection Management aseptic technique maintained     Intervention: Facilitate Optimal Urinary Elimination   05/05/18 0000   Genitourinary () Interventions   Urinary Elimination Promotion positioned for ease of voiding

## 2018-05-05 NOTE — DISCHARGE SUMMARY
Date of Discharge:  5/5/2018    Discharge Diagnosis:   UTI  37 week IUP    Problem List:  Active Problems:    Fever    UTI (urinary tract infection)      Presenting Problem/History of Present Illness  Fever [R50.9]  UTI (urinary tract infection) [N39.0]      Hospital Course  Patient is a 32 y.o. female presented with low-grade fever and body aches.  She was found have urinary tract infection and was started on cefoxitin.  Her condition improved and she was afebrile.  I had planned to keep her for another dose or 2 of antibiotics but the patient weren't that her father had passed away and she was frantic to leave.  We discussed it is very important to take the antibiotics by mouth and come back in for any worsening pain fever chills or anything like that.  Procedures Performed         Consults:   Consults     No orders found for last 30 day(s).          Pertinent Test Results:    Condition on Discharge: Stable  Vital Signs  Temp:  [98.2 °F (36.8 °C)-99.6 °F (37.6 °C)] 98.2 °F (36.8 °C)  Heart Rate:  [] 100  Resp:  [16-20] 18  BP: (104-116)/(59-66) 109/60    Discharge Disposition  Home or Self Care    Discharge Medications   Indore Zhanna   Home Medication Instructions MILTON:[id number]    Printed on:05/05/18 1142   Medication Information                      cefdinir (OMNICEF) 300 MG capsule  Take 1 capsule by mouth 2 (Two) Times a Day.             cetirizine (zyrTEC) 10 MG tablet  Take 1 tablet by mouth Daily.             montelukast (SINGULAIR) 10 MG tablet  Take 1 tablet by mouth Every Night.             Prenatal Vit-Fe Fumarate-FA (PRENATAL VITAMIN 27-0.8) 27-0.8 MG tablet tablet  Take 1 tablet by mouth Daily.             raNITIdine (ZANTAC) 150 MG tablet  Take 1 tablet by mouth Every Night.                 Discharge Diet       Activity at Discharge      Follow-up Appointments  Future Appointments  Date Time Provider Department Center   8/23/2018 5:00 PM NABEEL Jenkins            Time: Discharge 15 min

## 2018-05-05 NOTE — H&P
Patient Care Team:  NABEEL Jenkins as PCP - General  NABEEL Jenkins as PCP - Family Medicine    Chief complaint fever and body aches    Subjective     Patient is a 32 y.o. female  6 para 3 at 37 weeks and 6 days presented last night secondary to fever and body aches.  She is not having any other symptoms but urinalysis revealed a very dirty urine and so was presumed that this fevers from urinary tract infection.  She was started on cefoxitin and this morning feels much better.  She had a low-grade fever this morning at 99.6.  She says that since the antibiotics were started the body excellent result.  She has good fetal movement, no vaginal bleeding and only rare contractions.    Review of Systems   Pertinent items are noted in HPI    History  Past Medical History:   Diagnosis Date   • Abdominal pain    • Allergic rhinitis    • Arthritis     osteo   • Asthma    • Cough    • Depression    • Ectopic pregnancy    • History of transfusion    • HPV (human papilloma virus) infection    • Malaise and fatigue    • Respiratory abnormality    • Urinary tract infection    • Vitamin D deficiency      Past Surgical History:   Procedure Laterality Date   • DILATATION AND CURETTAGE     • SALPINGECTOMY Right    • WISDOM TOOTH EXTRACTION       Family History   Problem Relation Age of Onset   • Asthma Mother    • COPD Mother    • Diabetes Mother    • Hypertension Mother    • Arthritis Paternal Grandmother    • Cancer Paternal Grandmother    • COPD Paternal Grandmother    • Stroke Paternal Grandmother      Social History   Substance Use Topics   • Smoking status: Former Smoker     Types: Cigarettes     Quit date:    • Smokeless tobacco: Never Used   • Alcohol use No     Prescriptions Prior to Admission   Medication Sig Dispense Refill Last Dose   • cetirizine (zyrTEC) 10 MG tablet Take 1 tablet by mouth Daily. (Patient taking differently: Take 10 mg by mouth Every Night.) 30 tablet 5  5/3/2018 at 2000   • montelukast (SINGULAIR) 10 MG tablet Take 1 tablet by mouth Every Night. 30 tablet 5 5/3/2018 at 2000   • Prenatal Vit-Fe Fumarate-FA (PRENATAL VITAMIN 27-0.8) 27-0.8 MG tablet tablet Take 1 tablet by mouth Daily. (Patient taking differently: Take 1 tablet by mouth Every Night.) 30 tablet 5 5/3/2018 at 2000   • raNITIdine (ZANTAC) 150 MG tablet Take 1 tablet by mouth Every Night. (Patient taking differently: Take 150 mg by mouth 2 (Two) Times a Day.) 60 tablet 5 5/4/2018 at am     Allergies:  Penicillins    Objective     Vital Signs  Temp:  [98.2 °F (36.8 °C)-99.6 °F (37.6 °C)] 98.2 °F (36.8 °C)  Heart Rate:  [] 100  Resp:  [16-20] 18  BP: (104-116)/(59-66) 109/60    Physical Exam:      General Appearance:    Alert, cooperative, in no acute distress   Head:    Normocephalic, without obvious abnormality, atraumatic   Eyes:            Lids and lashes normal, conjunctivae and sclerae normal, no   icterus, no pallor, corneas clear, PERRLA   Ears:    Ears appear intact with no abnormalities noted   Throat:   No oral lesions, no thrush, oral mucosa moist   Neck:   No adenopathy, supple, trachea midline, no thyromegaly, no     carotid bruit, no JVD   Back:     No kyphosis present, no scoliosis present, no skin lesions,       erythema or scars, no tenderness to percussion or                   palpation,   range of motion normal   Lungs:     Clear to auscultation,respirations regular, even and                   unlabored    Heart:    Regular rhythm and normal rate, normal S1 and S2, no            murmur, no gallop, no rub, no click   Breast Exam:    Deferred   Abdomen:     Normal bowel sounds, no masses, no organomegaly, soft        non-tender, non-distended, no guarding, no rebound                 Tenderness gravid   Genitalia:    Deferred   Extremities:   Moves all extremities well, no edema, no cyanosis, no              redness   Pulses:   Pulses palpable and equal bilaterally   Skin:   No  bleeding, bruising or rash   Lymph nodes:   No palpable adenopathy   Neurologic:   Cranial nerves 2 - 12 grossly intact, sensation intact, DTR        present and equal bilaterally       Results Review:    I reviewed the patient's new clinical results.    Assessment/Plan     Active Problems:    Fever    UTI (urinary tract infection)  Urinary tract and infection and pregnancy-as the patient is febrile and had body aches I think we need to keep her for at least 24 hours afebrile.  If she looks perfectly this afternoon and may let her go or more likely keep her total morning.    Fetal heart tones reassuring    I discussed the patients findings and my recommendations with patient.     Jim Colunga DO  05/05/18  9:49 AM

## 2018-05-07 ENCOUNTER — TELEPHONE (OUTPATIENT)
Dept: FAMILY MEDICINE CLINIC | Facility: CLINIC | Age: 33
End: 2018-05-07

## 2018-05-07 LAB
BACTERIA SPEC AEROBE CULT: ABNORMAL
BACTERIA SPEC AEROBE CULT: ABNORMAL

## 2018-05-07 NOTE — TELEPHONE ENCOUNTER
I called spoke with pt, they put her in hospital overnight with IV ANTIBOTIC and sent her home with antibotic        ----- Message from NABEEL Jenkins sent at 5/7/2018  8:31 AM EDT -----  Please call make sure that the patient was sent home from the emergency room/GYN on antibiotics

## 2018-05-07 NOTE — TELEPHONE ENCOUNTER
----- Message from NABEEL Jenkins sent at 5/7/2018  8:31 AM EDT -----  Please call make sure that the patient was sent home from the emergency room/GYN on antibiotics

## 2018-05-15 ENCOUNTER — ANESTHESIA EVENT (OUTPATIENT)
Dept: LABOR AND DELIVERY | Facility: HOSPITAL | Age: 33
End: 2018-05-15

## 2018-05-15 ENCOUNTER — HOSPITAL ENCOUNTER (INPATIENT)
Facility: HOSPITAL | Age: 33
LOS: 2 days | Discharge: HOME | End: 2018-05-17
Attending: OBSTETRICS & GYNECOLOGY | Admitting: OBSTETRICS & GYNECOLOGY

## 2018-05-15 ENCOUNTER — ANESTHESIA (OUTPATIENT)
Dept: LABOR AND DELIVERY | Facility: HOSPITAL | Age: 33
End: 2018-05-15

## 2018-05-15 PROBLEM — Z34.90 PREGNANT: Status: ACTIVE | Noted: 2018-05-15

## 2018-05-15 LAB
ABO GROUP BLD: NORMAL
BLD GP AB SCN SERPL QL: NEGATIVE
DEPRECATED RDW RBC AUTO: 49.6 FL (ref 37–54)
ERYTHROCYTE [DISTWIDTH] IN BLOOD BY AUTOMATED COUNT: 14.8 % (ref 11.5–14.5)
HCT VFR BLD AUTO: 36.6 % (ref 37–47)
HGB BLD-MCNC: 12.1 G/DL (ref 12–16)
MCH RBC QN AUTO: 30.5 PG (ref 27–33)
MCHC RBC AUTO-ENTMCNC: 33.1 G/DL (ref 33–37)
MCV RBC AUTO: 92.2 FL (ref 80–94)
PLATELET # BLD AUTO: 151 10*3/MM3 (ref 130–400)
PMV BLD AUTO: 11.2 FL (ref 6–10)
RBC # BLD AUTO: 3.97 10*6/MM3 (ref 4.2–5.4)
RH BLD: POSITIVE
T&S EXPIRATION DATE: NORMAL
WBC NRBC COR # BLD: 9.86 10*3/MM3 (ref 4.5–12.5)

## 2018-05-15 PROCEDURE — 59025 FETAL NON-STRESS TEST: CPT

## 2018-05-15 PROCEDURE — 86850 RBC ANTIBODY SCREEN: CPT | Performed by: OBSTETRICS & GYNECOLOGY

## 2018-05-15 PROCEDURE — 85027 COMPLETE CBC AUTOMATED: CPT | Performed by: OBSTETRICS & GYNECOLOGY

## 2018-05-15 PROCEDURE — 10907ZC DRAINAGE OF AMNIOTIC FLUID, THERAPEUTIC FROM PRODUCTS OF CONCEPTION, VIA NATURAL OR ARTIFICIAL OPENING: ICD-10-PCS | Performed by: OBSTETRICS & GYNECOLOGY

## 2018-05-15 PROCEDURE — 86901 BLOOD TYPING SEROLOGIC RH(D): CPT | Performed by: OBSTETRICS & GYNECOLOGY

## 2018-05-15 PROCEDURE — 25010000002 METHYLERGONOVINE MALEATE PER 0.2 MG: Performed by: OBSTETRICS & GYNECOLOGY

## 2018-05-15 PROCEDURE — 86900 BLOOD TYPING SEROLOGIC ABO: CPT | Performed by: OBSTETRICS & GYNECOLOGY

## 2018-05-15 PROCEDURE — C1755 CATHETER, INTRASPINAL: HCPCS

## 2018-05-15 PROCEDURE — 25010000002 BUTORPHANOL PER 1 MG: Performed by: OBSTETRICS & GYNECOLOGY

## 2018-05-15 PROCEDURE — C1755 CATHETER, INTRASPINAL: HCPCS | Performed by: ANESTHESIOLOGY

## 2018-05-15 RX ORDER — ONDANSETRON 2 MG/ML
4 INJECTION INTRAMUSCULAR; INTRAVENOUS EVERY 6 HOURS PRN
Status: DISCONTINUED | OUTPATIENT
Start: 2018-05-15 | End: 2018-05-15 | Stop reason: HOSPADM

## 2018-05-15 RX ORDER — METOCLOPRAMIDE 10 MG/1
10 TABLET ORAL ONCE
Status: COMPLETED | OUTPATIENT
Start: 2018-05-15 | End: 2018-05-15

## 2018-05-15 RX ORDER — SODIUM CHLORIDE 0.9 % (FLUSH) 0.9 %
1-10 SYRINGE (ML) INJECTION AS NEEDED
Status: DISCONTINUED | OUTPATIENT
Start: 2018-05-15 | End: 2018-05-17 | Stop reason: HOSPADM

## 2018-05-15 RX ORDER — EPHEDRINE SULFATE 50 MG/ML
10 INJECTION, SOLUTION INTRAVENOUS
Status: DISCONTINUED | OUTPATIENT
Start: 2018-05-15 | End: 2018-05-15 | Stop reason: HOSPADM

## 2018-05-15 RX ORDER — ONDANSETRON 4 MG/1
4 TABLET, ORALLY DISINTEGRATING ORAL EVERY 6 HOURS PRN
Status: DISCONTINUED | OUTPATIENT
Start: 2018-05-15 | End: 2018-05-17 | Stop reason: HOSPADM

## 2018-05-15 RX ORDER — MISOPROSTOL 100 UG/1
800 TABLET ORAL AS NEEDED
Status: DISCONTINUED | OUTPATIENT
Start: 2018-05-15 | End: 2018-05-15 | Stop reason: HOSPADM

## 2018-05-15 RX ORDER — ROPIVACAINE HYDROCHLORIDE 2 MG/ML
14 INJECTION, SOLUTION EPIDURAL; INFILTRATION; PERINEURAL CONTINUOUS
Status: DISCONTINUED | OUTPATIENT
Start: 2018-05-15 | End: 2018-05-16

## 2018-05-15 RX ORDER — ACETAMINOPHEN 325 MG/1
650 TABLET ORAL EVERY 4 HOURS PRN
Status: DISCONTINUED | OUTPATIENT
Start: 2018-05-15 | End: 2018-05-17 | Stop reason: HOSPADM

## 2018-05-15 RX ORDER — LIDOCAINE HYDROCHLORIDE 20 MG/ML
INJECTION, SOLUTION EPIDURAL; INFILTRATION; INTRACAUDAL; PERINEURAL
Status: COMPLETED
Start: 2018-05-15 | End: 2018-05-15

## 2018-05-15 RX ORDER — IBUPROFEN 800 MG/1
800 TABLET ORAL EVERY 8 HOURS SCHEDULED
Status: DISCONTINUED | OUTPATIENT
Start: 2018-05-15 | End: 2018-05-17 | Stop reason: HOSPADM

## 2018-05-15 RX ORDER — TERBUTALINE SULFATE 1 MG/ML
0.25 INJECTION, SOLUTION SUBCUTANEOUS AS NEEDED
Status: DISCONTINUED | OUTPATIENT
Start: 2018-05-15 | End: 2018-05-15 | Stop reason: HOSPADM

## 2018-05-15 RX ORDER — FAMOTIDINE 20 MG/1
20 TABLET, FILM COATED ORAL 2 TIMES DAILY
Status: DISCONTINUED | OUTPATIENT
Start: 2018-05-15 | End: 2018-05-16

## 2018-05-15 RX ORDER — ONDANSETRON 4 MG/1
4 TABLET, ORALLY DISINTEGRATING ORAL EVERY 6 HOURS PRN
Status: DISCONTINUED | OUTPATIENT
Start: 2018-05-15 | End: 2018-05-15 | Stop reason: HOSPADM

## 2018-05-15 RX ORDER — OXYTOCIN/RINGER'S LACTATE 20/1000 ML
2-30 PLASTIC BAG, INJECTION (ML) INTRAVENOUS
Status: DISCONTINUED | OUTPATIENT
Start: 2018-05-15 | End: 2018-05-15 | Stop reason: HOSPADM

## 2018-05-15 RX ORDER — MONTELUKAST SODIUM 10 MG/1
10 TABLET ORAL NIGHTLY
Status: DISCONTINUED | OUTPATIENT
Start: 2018-05-15 | End: 2018-05-16

## 2018-05-15 RX ORDER — LIDOCAINE HYDROCHLORIDE 20 MG/ML
INJECTION, SOLUTION EPIDURAL; INFILTRATION; INTRACAUDAL; PERINEURAL AS NEEDED
Status: DISCONTINUED | OUTPATIENT
Start: 2018-05-15 | End: 2018-05-16 | Stop reason: SURG

## 2018-05-15 RX ORDER — OXYTOCIN/RINGER'S LACTATE 20/1000 ML
2 PLASTIC BAG, INJECTION (ML) INTRAVENOUS CONTINUOUS
Status: DISCONTINUED | OUTPATIENT
Start: 2018-05-15 | End: 2018-05-16

## 2018-05-15 RX ORDER — MAGNESIUM HYDROXIDE 1200 MG/15ML
1000 LIQUID ORAL ONCE AS NEEDED
Status: COMPLETED | OUTPATIENT
Start: 2018-05-15 | End: 2018-05-15

## 2018-05-15 RX ORDER — DOCUSATE SODIUM 100 MG/1
100 CAPSULE, LIQUID FILLED ORAL DAILY
Status: DISCONTINUED | OUTPATIENT
Start: 2018-05-16 | End: 2018-05-17 | Stop reason: HOSPADM

## 2018-05-15 RX ORDER — ONDANSETRON 4 MG/1
4 TABLET, FILM COATED ORAL EVERY 6 HOURS PRN
Status: DISCONTINUED | OUTPATIENT
Start: 2018-05-15 | End: 2018-05-15 | Stop reason: HOSPADM

## 2018-05-15 RX ORDER — SODIUM CHLORIDE, SODIUM LACTATE, POTASSIUM CHLORIDE, CALCIUM CHLORIDE 600; 310; 30; 20 MG/100ML; MG/100ML; MG/100ML; MG/100ML
125 INJECTION, SOLUTION INTRAVENOUS CONTINUOUS
Status: DISCONTINUED | OUTPATIENT
Start: 2018-05-15 | End: 2018-05-16

## 2018-05-15 RX ORDER — LANOLIN 100 %
OINTMENT (GRAM) TOPICAL
Status: DISCONTINUED | OUTPATIENT
Start: 2018-05-15 | End: 2018-05-17 | Stop reason: HOSPADM

## 2018-05-15 RX ORDER — BISACODYL 10 MG
10 SUPPOSITORY, RECTAL RECTAL DAILY PRN
Status: DISCONTINUED | OUTPATIENT
Start: 2018-05-16 | End: 2018-05-17 | Stop reason: HOSPADM

## 2018-05-15 RX ORDER — BUTORPHANOL TARTRATE 1 MG/ML
1 INJECTION, SOLUTION INTRAMUSCULAR; INTRAVENOUS
Status: DISCONTINUED | OUTPATIENT
Start: 2018-05-15 | End: 2018-05-15 | Stop reason: HOSPADM

## 2018-05-15 RX ORDER — ROPIVACAINE HYDROCHLORIDE 2 MG/ML
INJECTION, SOLUTION EPIDURAL; INFILTRATION; PERINEURAL
Status: DISCONTINUED
Start: 2018-05-15 | End: 2018-05-17 | Stop reason: HOSPADM

## 2018-05-15 RX ORDER — SODIUM CHLORIDE 0.9 % (FLUSH) 0.9 %
1-10 SYRINGE (ML) INJECTION AS NEEDED
Status: DISCONTINUED | OUTPATIENT
Start: 2018-05-15 | End: 2018-05-15 | Stop reason: HOSPADM

## 2018-05-15 RX ORDER — ONDANSETRON 4 MG/1
4 TABLET, FILM COATED ORAL EVERY 6 HOURS PRN
Status: DISCONTINUED | OUTPATIENT
Start: 2018-05-15 | End: 2018-05-17 | Stop reason: HOSPADM

## 2018-05-15 RX ORDER — LIDOCAINE HYDROCHLORIDE AND EPINEPHRINE 15; 5 MG/ML; UG/ML
INJECTION, SOLUTION EPIDURAL AS NEEDED
Status: DISCONTINUED | OUTPATIENT
Start: 2018-05-15 | End: 2018-05-16 | Stop reason: SURG

## 2018-05-15 RX ORDER — LIDOCAINE HYDROCHLORIDE 10 MG/ML
5 INJECTION, SOLUTION EPIDURAL; INFILTRATION; INTRACAUDAL; PERINEURAL AS NEEDED
Status: DISCONTINUED | OUTPATIENT
Start: 2018-05-15 | End: 2018-05-15 | Stop reason: HOSPADM

## 2018-05-15 RX ORDER — CARBOPROST TROMETHAMINE 250 UG/ML
250 INJECTION, SOLUTION INTRAMUSCULAR AS NEEDED
Status: DISCONTINUED | OUTPATIENT
Start: 2018-05-15 | End: 2018-05-15 | Stop reason: HOSPADM

## 2018-05-15 RX ORDER — METHYLERGONOVINE MALEATE 0.2 MG/ML
200 INJECTION INTRAVENOUS ONCE AS NEEDED
Status: COMPLETED | OUTPATIENT
Start: 2018-05-15 | End: 2018-05-15

## 2018-05-15 RX ORDER — CETIRIZINE HYDROCHLORIDE 10 MG/1
10 TABLET ORAL NIGHTLY
Status: DISCONTINUED | OUTPATIENT
Start: 2018-05-15 | End: 2018-05-16

## 2018-05-15 RX ORDER — ONDANSETRON 2 MG/ML
4 INJECTION INTRAMUSCULAR; INTRAVENOUS ONCE AS NEEDED
Status: DISCONTINUED | OUTPATIENT
Start: 2018-05-15 | End: 2018-05-15 | Stop reason: HOSPADM

## 2018-05-15 RX ORDER — ONDANSETRON 2 MG/ML
4 INJECTION INTRAMUSCULAR; INTRAVENOUS EVERY 6 HOURS PRN
Status: DISCONTINUED | OUTPATIENT
Start: 2018-05-15 | End: 2018-05-17 | Stop reason: HOSPADM

## 2018-05-15 RX ORDER — PRENATAL VIT/IRON FUM/FOLIC AC 27MG-0.8MG
1 TABLET ORAL NIGHTLY
Status: DISCONTINUED | OUTPATIENT
Start: 2018-05-16 | End: 2018-05-16

## 2018-05-15 RX ORDER — ZOLPIDEM TARTRATE 5 MG/1
5 TABLET ORAL NIGHTLY PRN
Status: DISCONTINUED | OUTPATIENT
Start: 2018-05-15 | End: 2018-05-17 | Stop reason: HOSPADM

## 2018-05-15 RX ORDER — FAMOTIDINE 10 MG/ML
20 INJECTION, SOLUTION INTRAVENOUS ONCE AS NEEDED
Status: DISCONTINUED | OUTPATIENT
Start: 2018-05-15 | End: 2018-05-15 | Stop reason: HOSPADM

## 2018-05-15 RX ADMIN — IBUPROFEN 800 MG: 800 TABLET ORAL at 21:36

## 2018-05-15 RX ADMIN — MAGNESIUM HYDROXIDE 10 ML: 2400 SUSPENSION ORAL at 22:29

## 2018-05-15 RX ADMIN — LIDOCAINE HYDROCHLORIDE 10 ML: 20 INJECTION, SOLUTION EPIDURAL; INFILTRATION; INTRACAUDAL; PERINEURAL at 14:49

## 2018-05-15 RX ADMIN — SODIUM CHLORIDE, POTASSIUM CHLORIDE, SODIUM LACTATE AND CALCIUM CHLORIDE 1000 ML: 600; 310; 30; 20 INJECTION, SOLUTION INTRAVENOUS at 13:59

## 2018-05-15 RX ADMIN — SODIUM CHLORIDE, POTASSIUM CHLORIDE, SODIUM LACTATE AND CALCIUM CHLORIDE 125 ML/HR: 600; 310; 30; 20 INJECTION, SOLUTION INTRAVENOUS at 07:57

## 2018-05-15 RX ADMIN — MONTELUKAST SODIUM 10 MG: 10 TABLET, COATED ORAL at 22:32

## 2018-05-15 RX ADMIN — MISOPROSTOL 600 MCG: 100 TABLET ORAL at 16:11

## 2018-05-15 RX ADMIN — METOCLOPRAMIDE 10 MG: 10 TABLET ORAL at 22:32

## 2018-05-15 RX ADMIN — OXYTOCIN 2 MILLI-UNITS/MIN: 10 INJECTION INTRAVENOUS at 08:45

## 2018-05-15 RX ADMIN — LIDOCAINE HYDROCHLORIDE AND EPINEPHRINE 3 ML: 15; 5 INJECTION, SOLUTION EPIDURAL at 14:45

## 2018-05-15 RX ADMIN — BUTORPHANOL TARTRATE 1 MG: 1 INJECTION, SOLUTION INTRAMUSCULAR; INTRAVENOUS at 12:03

## 2018-05-15 RX ADMIN — SODIUM CHLORIDE 1000 ML: 900 IRRIGANT IRRIGATION at 16:11

## 2018-05-15 RX ADMIN — METHYLERGONOVINE MALEATE 200 MCG: 0.2 INJECTION, SOLUTION INTRAMUSCULAR; INTRAVENOUS at 17:20

## 2018-05-15 NOTE — NON STRESS TEST
Zhanna Veras, a  at 39w2d with an LETY of 2018, by Last Menstrual Period, was seen at [facility] LABOR DELIVERY for a nonstress test.    Chief Complaint   Patient presents with   • Laboring     contractions

## 2018-05-15 NOTE — L&D DELIVERY NOTE
[facility]  Vaginal Delivery Note    Delivery     Delivery: Vaginal, Spontaneous Delivery     YOB: 2018    Time of Birth: 4:04 PM      Anesthesia: Epidural     Delivering clinician: Jim Colunga    Forceps?   No   Vacuum? No    Shoulder dystocia present: No        Delivery narrative:      Infant    Findings: unspecified sex  infant     Infant observations: Weight: No birth weight on file.   Length:    in  Observations/Comments:         Apgars:    @ 1 minute /       @ 5 minutes   Infant Name:      Placenta, Cord, and Fluid    Placenta delivered     at        Cord: 3 vessels  present.   Nuchal Cord?  no   Cord blood obtained: Yes                   Repair    Episiotomy: Not recorded    Lacerations: No   Estimated Blood Loss:    300 mls.   Suture used for repair:      Complications  none    Disposition  Mother to postpartum in stable condition.    Jim Colunga DO  05/15/18  4:20 PM

## 2018-05-15 NOTE — H&P
This patient presented with irregular contractions and is 4-5 cm.  She appears to be in labor.  I have ruptured her membranes with clear fluid anticipate spontaneous vaginal delivery.

## 2018-05-15 NOTE — ANESTHESIA PREPROCEDURE EVALUATION
Anesthesia Evaluation                  Airway   Mallampati: II  TM distance: >3 FB  Neck ROM: full  no difficulty expected  Dental - normal exam     Pulmonary - normal exam   (+) asthma,   Cardiovascular - normal exam        Neuro/Psych  (+) psychiatric history,     GI/Hepatic/Renal/Endo      Musculoskeletal     Abdominal  - normal exam    Bowel sounds: normal.   Substance History      OB/GYN    (+) Pregnant,         Other   (+) arthritis                     Anesthesia Plan    ASA 2     general     intravenous induction   Anesthetic plan and risks discussed with patient.    Plan discussed with CRNA.

## 2018-05-15 NOTE — ANESTHESIA PROCEDURE NOTES
Labor Epidural    Patient location during procedure: OB  Start Time: 5/15/2018 2:39 PM  Indication:at surgeon's request  Preanesthetic Checklist  Completed: patient identified, site marked, surgical consent, pre-op evaluation, timeout performed, IV checked, risks and benefits discussed and monitors and equipment checked  Prep:  Pt Position:sitting  Sterile Tech:gloves, mask, sterile barrier and cap  Prep:povidone-iodine 7.5% surgical scrub  Monitoring:blood pressure monitoring  Epidural Block Procedure:  Approach:midline  Guidance:landmark technique and palpation technique  Location:L3-L4  Needle Type:Tuohy  Needle Gauge:17 G  Loss of Resistance Medium: air  Loss of Resistance: 6cm  Cath Depth at skin:12 cm  Paresthesia: none  Aspiration:negative  Test Dose:negative  Number of Attempts: 1  Post Assessment:  Dressing:occlusive dressing applied and secured with tape  Pt Tolerance:patient tolerated the procedure well with no apparent complications  Complications:no

## 2018-05-16 PROBLEM — Z34.90 PREGNANT: Status: RESOLVED | Noted: 2018-05-15 | Resolved: 2018-05-16

## 2018-05-16 LAB
BASOPHILS # BLD AUTO: 0.02 10*3/MM3 (ref 0–0.3)
BASOPHILS NFR BLD AUTO: 0.1 % (ref 0–2)
DEPRECATED RDW RBC AUTO: 48.7 FL (ref 37–54)
EOSINOPHIL # BLD AUTO: 0.04 10*3/MM3 (ref 0–0.7)
EOSINOPHIL NFR BLD AUTO: 0.3 % (ref 0–5)
ERYTHROCYTE [DISTWIDTH] IN BLOOD BY AUTOMATED COUNT: 14.9 % (ref 11.5–14.5)
HCT VFR BLD AUTO: 37.6 % (ref 37–47)
HGB BLD-MCNC: 12.5 G/DL (ref 12–16)
IMM GRANULOCYTES # BLD: 0.05 10*3/MM3 (ref 0–0.03)
IMM GRANULOCYTES NFR BLD: 0.4 % (ref 0–0.5)
LYMPHOCYTES # BLD AUTO: 1.61 10*3/MM3 (ref 1–3)
LYMPHOCYTES NFR BLD AUTO: 11.6 % (ref 21–51)
MCH RBC QN AUTO: 31.3 PG (ref 27–33)
MCHC RBC AUTO-ENTMCNC: 33.2 G/DL (ref 33–37)
MCV RBC AUTO: 94 FL (ref 80–94)
MONOCYTES # BLD AUTO: 0.95 10*3/MM3 (ref 0.1–0.9)
MONOCYTES NFR BLD AUTO: 6.9 % (ref 0–10)
NEUTROPHILS # BLD AUTO: 11.15 10*3/MM3 (ref 1.4–6.5)
NEUTROPHILS NFR BLD AUTO: 80.7 % (ref 30–70)
PLATELET # BLD AUTO: 149 10*3/MM3 (ref 130–400)
PMV BLD AUTO: 11.3 FL (ref 6–10)
RBC # BLD AUTO: 4 10*6/MM3 (ref 4.2–5.4)
WBC NRBC COR # BLD: 13.82 10*3/MM3 (ref 4.5–12.5)

## 2018-05-16 PROCEDURE — 85025 COMPLETE CBC W/AUTO DIFF WBC: CPT | Performed by: OBSTETRICS & GYNECOLOGY

## 2018-05-16 RX ADMIN — IBUPROFEN 800 MG: 800 TABLET ORAL at 05:47

## 2018-05-16 RX ADMIN — FAMOTIDINE 20 MG: 20 TABLET, FILM COATED ORAL at 08:19

## 2018-05-16 RX ADMIN — IBUPROFEN 800 MG: 800 TABLET ORAL at 21:27

## 2018-05-16 RX ADMIN — IBUPROFEN 800 MG: 800 TABLET ORAL at 14:16

## 2018-05-16 NOTE — PROGRESS NOTES
" [facility]  Vaginal Delivery Progress Note    Subjective   Subjective  Postpartum Day 1: Vaginal Delivery    The patient feels well.  Her pain is well controlled with nonsteroidal anti-inflammatory drugs.   She is ambulating well.  Patient describes her bleeding as moderate lochia.    Breastfeeding: infant latching.    Objective     Objective   Vital Signs Range for the last 24 hours  Temperature: Temp:  [96.8 °F (36 °C)-98.3 °F (36.8 °C)] 98 °F (36.7 °C)   Temp Source: Temp src: Oral   BP: BP: (100-136)/(55-73) 110/69   Pulse: Heart Rate:  [65-96] 67   Respirations: Resp:  [16-18] 16   Weight:       Admit Height:  Height: 162.6 cm (64\")    Physical Exam:  General:  no acute distresss.  Abdomen: Fundus: appropriate, firm, non tender  Extremities: normal, atraumatic, no cyanosis, and trace edema.       [unfilled]       Lab Results   Component Value Date    ABO O 05/15/2018    RH Positive 05/15/2018        Lab Results   Component Value Date    HGB 12.5 05/16/2018    HCT 37.6 05/16/2018         Assessment/Plan   Assessment & Plan  Active Problems:    Postpartum care following vaginal delivery      Zhanna Veras is Day 1  post-partum  Vaginal, Spontaneous Delivery    .      Plan:  Continue current care.      DELORES Oropeza  5/16/2018  9:20 AM    "

## 2018-05-16 NOTE — PLAN OF CARE
Problem: Patient Care Overview  Goal: Plan of Care Review  Outcome: Ongoing (interventions implemented as appropriate)   05/16/18 3240   Coping/Psychosocial   Plan of Care Reviewed With patient   Plan of Care Review   Progress improving   OTHER   Outcome Summary Fundus firm, small rubra bleeding. Breastfeeding well. No complaints at this time.     Goal: Individualization and Mutuality  Outcome: Ongoing (interventions implemented as appropriate)    Goal: Discharge Needs Assessment  Outcome: Ongoing (interventions implemented as appropriate)    Goal: Interprofessional Rounds/Family Conf  Outcome: Ongoing (interventions implemented as appropriate)      Problem: Postpartum (Vaginal Delivery) (Adult,Obstetrics,Pediatric)  Goal: Signs and Symptoms of Listed Potential Problems Will be Absent, Minimized or Managed (Postpartum)  Outcome: Ongoing (interventions implemented as appropriate)

## 2018-05-16 NOTE — PLAN OF CARE
Problem: Patient Care Overview  Goal: Plan of Care Review  Outcome: Ongoing (interventions implemented as appropriate)   05/16/18 0600   Coping/Psychosocial   Plan of Care Reviewed With patient   Plan of Care Review   Progress improving     Goal: Individualization and Mutuality  Outcome: Ongoing (interventions implemented as appropriate)    Goal: Discharge Needs Assessment  Outcome: Ongoing (interventions implemented as appropriate)      Problem: Postpartum (Vaginal Delivery) (Adult,Obstetrics,Pediatric)  Goal: Signs and Symptoms of Listed Potential Problems Will be Absent, Minimized or Managed (Postpartum)  Outcome: Ongoing (interventions implemented as appropriate)

## 2018-05-17 VITALS
RESPIRATION RATE: 20 BRPM | HEIGHT: 64 IN | DIASTOLIC BLOOD PRESSURE: 61 MMHG | BODY MASS INDEX: 31.24 KG/M2 | OXYGEN SATURATION: 97 % | TEMPERATURE: 97.6 F | HEART RATE: 63 BPM | SYSTOLIC BLOOD PRESSURE: 98 MMHG | WEIGHT: 183 LBS

## 2018-05-17 RX ORDER — IBUPROFEN 600 MG/1
600 TABLET ORAL EVERY 6 HOURS PRN
Qty: 40 TABLET | Refills: 1 | Status: SHIPPED | OUTPATIENT
Start: 2018-05-17 | End: 2019-01-02 | Stop reason: SDUPTHER

## 2018-05-17 RX ORDER — FAMOTIDINE 20 MG/1
20 TABLET, FILM COATED ORAL 2 TIMES DAILY
Status: DISCONTINUED | OUTPATIENT
Start: 2018-05-17 | End: 2018-05-17 | Stop reason: HOSPADM

## 2018-05-17 RX ADMIN — FAMOTIDINE 20 MG: 20 TABLET, FILM COATED ORAL at 10:15

## 2018-05-17 RX ADMIN — IBUPROFEN 800 MG: 800 TABLET ORAL at 06:34

## 2018-05-17 NOTE — PROGRESS NOTES
" [facility]  Vaginal Delivery Progress Note    Subjective   Subjective  Postpartum Day 2: Vaginal Delivery    The patient feels well.  Her pain is well controlled with nonsteroidal anti-inflammatory drugs.   She is ambulating well.  Patient describes her bleeding as moderate lochia.    Breastfeeding: infant latching.    Objective     Objective:  Vital signs (most recent): Blood pressure 98/61, pulse 63, temperature 97.6 °F (36.4 °C), temperature source Oral, resp. rate 20, height 162.6 cm (64\"), weight 83 kg (183 lb), last menstrual period 08/13/2017, SpO2 97 %, currently breastfeeding.     Vital Signs Range for the last 24 hours  Temperature: Temp:  [97.6 °F (36.4 °C)-98.2 °F (36.8 °C)] 97.6 °F (36.4 °C)   Temp Source: Temp src: Oral   BP: BP: ()/(61-78) 98/61   Pulse: Heart Rate:  [63-65] 63   Respirations: Resp:  [18-20] 20   Weight:       Admit Height:  Height: 162.6 cm (64\")    Physical Exam:  General:  no acute distresss.  Abdomen: Fundus: appropriate, firm, non tender  Extremities: normal, atraumatic, no cyanosis, and trace edema.       [unfilled]       Lab Results   Component Value Date    ABO O 05/15/2018    RH Positive 05/15/2018        Lab Results   Component Value Date    HGB 12.5 05/16/2018    HCT 37.6 05/16/2018         Assessment/Plan   Assessment & Plan  Active Problems:    Postpartum care following vaginal delivery      Zhanna Veras is Day 2  post-partum  Vaginal, Spontaneous Delivery    .      Plan:  Continue current care.      DELORES Oropeza  5/17/2018  10:07 AM    "

## 2018-05-17 NOTE — DISCHARGE SUMMARY
ASHLEIGH Laird  Delivery Discharge Summary    Primary OB Clinician:     EDC: Estimated Date of Delivery: 18    Gestational Age:39w2d    Antepartum complications: none    Date of Delivery: 5/15/2018   Time of Delivery: 4:04 PM     Delivered By:  Jim Colunga     Delivery Type: Vaginal, Spontaneous Delivery      Tubal Ligation: n/a    Baby:Female  Apgar:  8   @ 1 minute /   Apgar:  9   @ 5 minutes   Weight: 9lb 1.5oz     Anesthesia: Epidural      Intrapartum complications: None    Laceration: No    Episiotomy: No    Placenta: Spontaneous     Feeding method: Breastfeeding Status: Yes    [unfilled]       Lab Results   Component Value Date    ABO O 05/15/2018    RH Positive 05/15/2018        Lab Results   Component Value Date    HGB 12.5 2018    HCT 37.6 2018       Rh Immune globulin given: not applicable      Discharge Date: 2018; Discharge Time: 10:08 AM        Plan:    Address and phone number verified and same.  Follow-up appointment with MT/AMB in 3 weeks.      DELORES Oropeza  2018  10:08 AM

## 2018-08-22 ENCOUNTER — OFFICE VISIT (OUTPATIENT)
Dept: FAMILY MEDICINE CLINIC | Facility: CLINIC | Age: 33
End: 2018-08-22

## 2018-08-22 VITALS
HEART RATE: 90 BPM | DIASTOLIC BLOOD PRESSURE: 72 MMHG | OXYGEN SATURATION: 97 % | HEIGHT: 64 IN | SYSTOLIC BLOOD PRESSURE: 98 MMHG | TEMPERATURE: 97.5 F | WEIGHT: 156 LBS | BODY MASS INDEX: 26.63 KG/M2

## 2018-08-22 DIAGNOSIS — J45.40 MODERATE PERSISTENT ASTHMA WITHOUT COMPLICATION: ICD-10-CM

## 2018-08-22 DIAGNOSIS — J30.89 OTHER ALLERGIC RHINITIS: ICD-10-CM

## 2018-08-22 DIAGNOSIS — J30.2 ACUTE SEASONAL ALLERGIC RHINITIS, UNSPECIFIED TRIGGER: Primary | ICD-10-CM

## 2018-08-22 DIAGNOSIS — H65.113 ACUTE ALLERGIC MUCOID OTITIS MEDIA, BILATERAL: ICD-10-CM

## 2018-08-22 PROBLEM — Z3A.33 33 WEEKS GESTATION OF PREGNANCY: Status: RESOLVED | Noted: 2018-04-05 | Resolved: 2018-08-22

## 2018-08-22 PROCEDURE — 99214 OFFICE O/P EST MOD 30 MIN: CPT | Performed by: NURSE PRACTITIONER

## 2018-08-22 RX ORDER — CETIRIZINE HYDROCHLORIDE 10 MG/1
10 TABLET ORAL NIGHTLY
Qty: 30 TABLET | Refills: 5 | Status: SHIPPED | OUTPATIENT
Start: 2018-08-22 | End: 2019-01-02 | Stop reason: SDUPTHER

## 2018-08-22 RX ORDER — GUAIFENESIN 200 MG/10ML
200 LIQUID ORAL 3 TIMES DAILY PRN
Qty: 236 ML | Refills: 0 | Status: SHIPPED | OUTPATIENT
Start: 2018-08-22 | End: 2019-02-28

## 2018-08-22 RX ORDER — ALBUTEROL SULFATE 90 UG/1
2 AEROSOL, METERED RESPIRATORY (INHALATION) EVERY 4 HOURS PRN
Qty: 1 INHALER | Refills: 5 | Status: SHIPPED | OUTPATIENT
Start: 2018-08-22 | End: 2019-01-02 | Stop reason: SDUPTHER

## 2018-08-22 RX ORDER — NEOMYCIN SULFATE, POLYMYXIN B SULFATE, HYDROCORTISONE 3.5; 10000; 1 MG/ML; [USP'U]/ML; MG/ML
3 SOLUTION/ DROPS AURICULAR (OTIC) 4 TIMES DAILY
Qty: 1 BOTTLE | Refills: 0 | Status: SHIPPED | OUTPATIENT
Start: 2018-08-22 | End: 2019-02-28

## 2018-08-22 RX ORDER — MONTELUKAST SODIUM 10 MG/1
10 TABLET ORAL NIGHTLY
Qty: 30 TABLET | Refills: 5 | Status: SHIPPED | OUTPATIENT
Start: 2018-08-22 | End: 2019-01-02 | Stop reason: SDUPTHER

## 2018-08-22 RX ORDER — AZITHROMYCIN 250 MG/1
TABLET, FILM COATED ORAL
Qty: 6 TABLET | Refills: 0 | Status: SHIPPED | OUTPATIENT
Start: 2018-08-22 | End: 2019-02-18

## 2018-08-22 NOTE — PROGRESS NOTES
"Yoni Veras is a 32 y.o. female.     Chief Complaint   Patient presents with   • work physical   • URI       History of Present Illness     The following portions of the patient's history were reviewed and updated as appropriate: allergies, current medications, past family history, past medical history, past social history, past surgical history and problem list.    Review of Systems    Objective     BP 98/72   Pulse 90   Temp 97.5 °F (36.4 °C)   Ht 162.6 cm (64.02\")   Wt 70.8 kg (156 lb)   LMP 08/13/2017   SpO2 97%   BMI 26.76 kg/m²   Admission on 05/15/2018, Discharged on 05/17/2018   Component Date Value Ref Range Status   • WBC 05/15/2018 9.86  4.50 - 12.50 10*3/mm3 Final   • RBC 05/15/2018 3.97* 4.20 - 5.40 10*6/mm3 Final   • Hemoglobin 05/15/2018 12.1  12.0 - 16.0 g/dL Final   • Hematocrit 05/15/2018 36.6* 37.0 - 47.0 % Final   • MCV 05/15/2018 92.2  80.0 - 94.0 fL Final   • MCH 05/15/2018 30.5  27.0 - 33.0 pg Final   • MCHC 05/15/2018 33.1  33.0 - 37.0 g/dL Final   • RDW 05/15/2018 14.8* 11.5 - 14.5 % Final   • RDW-SD 05/15/2018 49.6  37.0 - 54.0 fl Final   • MPV 05/15/2018 11.2* 6.0 - 10.0 fL Final   • Platelets 05/15/2018 151  130 - 400 10*3/mm3 Final   • ABO Type 05/15/2018 O   Final   • RH type 05/15/2018 Positive   Final   • Antibody Screen 05/15/2018 Negative   Final   • T&S Expiration Date 05/15/2018 5/18/2018 11:59:59 PM   Final   • WBC 05/16/2018 13.82* 4.50 - 12.50 10*3/mm3 Final   • RBC 05/16/2018 4.00* 4.20 - 5.40 10*6/mm3 Final   • Hemoglobin 05/16/2018 12.5  12.0 - 16.0 g/dL Final   • Hematocrit 05/16/2018 37.6  37.0 - 47.0 % Final   • MCV 05/16/2018 94.0  80.0 - 94.0 fL Final   • MCH 05/16/2018 31.3  27.0 - 33.0 pg Final   • MCHC 05/16/2018 33.2  33.0 - 37.0 g/dL Final   • RDW 05/16/2018 14.9* 11.5 - 14.5 % Final   • RDW-SD 05/16/2018 48.7  37.0 - 54.0 fl Final   • MPV 05/16/2018 11.3* 6.0 - 10.0 fL Final   • Platelets 05/16/2018 149  130 - 400 10*3/mm3 Final   • " Neutrophil % 05/16/2018 80.7* 30.0 - 70.0 % Final   • Lymphocyte % 05/16/2018 11.6* 21.0 - 51.0 % Final   • Monocyte % 05/16/2018 6.9  0.0 - 10.0 % Final   • Eosinophil % 05/16/2018 0.3  0.0 - 5.0 % Final   • Basophil % 05/16/2018 0.1  0.0 - 2.0 % Final   • Immature Grans % 05/16/2018 0.4  0.0 - 0.5 % Final   • Neutrophils, Absolute 05/16/2018 11.15* 1.40 - 6.50 10*3/mm3 Final   • Lymphocytes, Absolute 05/16/2018 1.61  1.00 - 3.00 10*3/mm3 Final   • Monocytes, Absolute 05/16/2018 0.95* 0.10 - 0.90 10*3/mm3 Final   • Eosinophils, Absolute 05/16/2018 0.04  0.00 - 0.70 10*3/mm3 Final   • Basophils, Absolute 05/16/2018 0.02  0.00 - 0.30 10*3/mm3 Final   • Immature Grans, Absolute 05/16/2018 0.05* 0.00 - 0.03 10*3/mm3 Final       Physical Exam    Assessment/Plan     Problem List Items Addressed This Visit     None                   Patient's Body mass index is 26.76 kg/m². BMI is {BMI range:29312}.    I advised Zhanna of the risks of continuing to use tobacco, and I provided her with tobacco cessation educational materials in the After Visit Summary.     During this visit, I spent *** minutes counseling the patient regarding tobacco cessation.    I have discussed diagnosis in detail today allowing time for questions and answers. Pt is aware of reasons to seek urgent or emergent medical care as well as reasons to return to the clinic for evaluation. Possible side effects, interactions and progression of symptoms discussed as well. Pt / family states understanding.   Emotional support and active listening provided.         This document has been electronically signed by:  NABEEL Hill, NP-C

## 2018-08-22 NOTE — PROGRESS NOTES
Subjective   Zhanna Veras is a 32 y.o. female.     Chief Complaint   Patient presents with   • work physical   • URI     Was scheduled for annual well exam which will be converted to a sick visit today.     Patient is requesting a work physical wall present.      History of Present Illness     Uri x 3 days. 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  Sore throat, fever and chills.     Asthma with frequent exacerbations this time of year.  Current exacerbation.  Not currently taking a daily corticosteroid or long-acting beta agonist.  Patient was taking Breo and stopped while pregnant. She does not wish to be on a daily inhaler, understands the risks.  Currently receives Zyrtec and Singulair.    She does have an albuterol inhaler which she uses less often than daily except during exacerbations.    Patient is currently breast-feeding.            The following portions of the patient's history were reviewed and updated as appropriate: allergies, current medications, past family history, past medical history, past social history, past surgical history and problem list.    Review of Systems   Constitutional: Positive for activity change, appetite change, chills, fatigue and fever. Negative for unexpected weight change.   HENT: Positive for ear pain, sinus pain, sinus pressure, sore throat and trouble swallowing. Negative for congestion, nosebleeds, postnasal drip, rhinorrhea and voice change.    Eyes: Negative for pain and visual disturbance.   Respiratory: Positive for cough. Negative for chest tightness, shortness of breath and wheezing.    Cardiovascular: Negative for chest pain and palpitations.   Gastrointestinal: Positive for nausea. Negative for abdominal pain, blood in stool, constipation and diarrhea.   Endocrine: Negative for cold intolerance and polydipsia.   Genitourinary: Negative for difficulty urinating, flank pain and hematuria.   Musculoskeletal: Negative for arthralgias, back pain, gait problem, joint swelling and  "myalgias.   Skin: Negative for color change and rash.   Allergic/Immunologic: Negative.    Neurological: Positive for headaches. Negative for syncope and numbness.   Hematological: Negative.    Psychiatric/Behavioral: Negative for sleep disturbance and suicidal ideas.   All other systems reviewed and are negative.      Objective     BP 98/72   Pulse 90   Temp 97.5 °F (36.4 °C)   Ht 162.6 cm (64.02\")   Wt 70.8 kg (156 lb)   LMP 08/13/2017   SpO2 97%   BMI 26.76 kg/m²   Admission on 05/15/2018, Discharged on 05/17/2018   Component Date Value Ref Range Status   • WBC 05/15/2018 9.86  4.50 - 12.50 10*3/mm3 Final   • RBC 05/15/2018 3.97* 4.20 - 5.40 10*6/mm3 Final   • Hemoglobin 05/15/2018 12.1  12.0 - 16.0 g/dL Final   • Hematocrit 05/15/2018 36.6* 37.0 - 47.0 % Final   • MCV 05/15/2018 92.2  80.0 - 94.0 fL Final   • MCH 05/15/2018 30.5  27.0 - 33.0 pg Final   • MCHC 05/15/2018 33.1  33.0 - 37.0 g/dL Final   • RDW 05/15/2018 14.8* 11.5 - 14.5 % Final   • RDW-SD 05/15/2018 49.6  37.0 - 54.0 fl Final   • MPV 05/15/2018 11.2* 6.0 - 10.0 fL Final   • Platelets 05/15/2018 151  130 - 400 10*3/mm3 Final   • ABO Type 05/15/2018 O   Final   • RH type 05/15/2018 Positive   Final   • Antibody Screen 05/15/2018 Negative   Final   • T&S Expiration Date 05/15/2018 5/18/2018 11:59:59 PM   Final   • WBC 05/16/2018 13.82* 4.50 - 12.50 10*3/mm3 Final   • RBC 05/16/2018 4.00* 4.20 - 5.40 10*6/mm3 Final   • Hemoglobin 05/16/2018 12.5  12.0 - 16.0 g/dL Final   • Hematocrit 05/16/2018 37.6  37.0 - 47.0 % Final   • MCV 05/16/2018 94.0  80.0 - 94.0 fL Final   • MCH 05/16/2018 31.3  27.0 - 33.0 pg Final   • MCHC 05/16/2018 33.2  33.0 - 37.0 g/dL Final   • RDW 05/16/2018 14.9* 11.5 - 14.5 % Final   • RDW-SD 05/16/2018 48.7  37.0 - 54.0 fl Final   • MPV 05/16/2018 11.3* 6.0 - 10.0 fL Final   • Platelets 05/16/2018 149  130 - 400 10*3/mm3 Final   • Neutrophil % 05/16/2018 80.7* 30.0 - 70.0 % Final   • Lymphocyte % 05/16/2018 11.6* 21.0 - " 51.0 % Final   • Monocyte % 05/16/2018 6.9  0.0 - 10.0 % Final   • Eosinophil % 05/16/2018 0.3  0.0 - 5.0 % Final   • Basophil % 05/16/2018 0.1  0.0 - 2.0 % Final   • Immature Grans % 05/16/2018 0.4  0.0 - 0.5 % Final   • Neutrophils, Absolute 05/16/2018 11.15* 1.40 - 6.50 10*3/mm3 Final   • Lymphocytes, Absolute 05/16/2018 1.61  1.00 - 3.00 10*3/mm3 Final   • Monocytes, Absolute 05/16/2018 0.95* 0.10 - 0.90 10*3/mm3 Final   • Eosinophils, Absolute 05/16/2018 0.04  0.00 - 0.70 10*3/mm3 Final   • Basophils, Absolute 05/16/2018 0.02  0.00 - 0.30 10*3/mm3 Final   • Immature Grans, Absolute 05/16/2018 0.05* 0.00 - 0.03 10*3/mm3 Final       Physical Exam   Constitutional: She is oriented to person, place, and time. Vital signs are normal. She appears well-developed and well-nourished. No distress.   Mildly hypotensive 32-year-old female presents today with her infant daughter.    HENT:   Head: Normocephalic.   Right Ear: External ear normal. There is tenderness. Tympanic membrane is erythematous and bulging. A middle ear effusion is present.   Left Ear: External ear normal. There is tenderness. Tympanic membrane is erythematous and bulging. A middle ear effusion is present.   Nose: Rhinorrhea present. Right sinus exhibits maxillary sinus tenderness. Right sinus exhibits no frontal sinus tenderness. Left sinus exhibits maxillary sinus tenderness. Left sinus exhibits no frontal sinus tenderness.   Mouth/Throat: Mucous membranes are normal. Posterior oropharyngeal edema and posterior oropharyngeal erythema present. No oropharyngeal exudate.   Eyes: Pupils are equal, round, and reactive to light. Conjunctivae are normal. Right eye exhibits no discharge. Left eye exhibits no discharge.   Neck: Normal range of motion. Neck supple. No tracheal deviation present. No thyromegaly present.   Cardiovascular: Normal rate, regular rhythm and normal heart sounds.  Exam reveals no gallop and no friction rub.    No murmur  heard.  Pulmonary/Chest: Effort normal and breath sounds normal. No respiratory distress. She has no wheezes. She has no rhonchi. She has no rales. She exhibits no tenderness.   Abdominal: Soft. Bowel sounds are normal. She exhibits no distension and no mass. There is no tenderness. There is no rebound and no guarding.   Musculoskeletal: Normal range of motion.   Lymphadenopathy:     She has no cervical adenopathy.   Neurological: She is alert and oriented to person, place, and time. She has normal reflexes.   CN 2-12 grossly intact    Skin: Skin is warm and dry. Capillary refill takes less than 2 seconds. No rash noted. She is not diaphoretic. No erythema.   Psychiatric: She has a normal mood and affect. Her speech is normal and behavior is normal. Judgment and thought content normal. Cognition and memory are normal.   Vitals reviewed.      Visual Acuity Screening    Right eye Left eye Both eyes   Without correction:      With correction: 20/20 20/20 20/20       PHQ-9 Depression Screening  Little interest or pleasure in doing things?  0   Feeling down, depressed, or hopeless?  0   Trouble falling or staying asleep, or sleeping too much?  0   Feeling tired or having little energy?  0   Poor appetite or overeating?  0   Feeling bad about yourself - or that you are a failure or have let yourself or your family down? 0   Trouble concentrating on things, such as reading the newspaper or watching television?  0   Moving or speaking so slowly that other people could have noticed? Or the opposite - being so fidgety or restless that you have been moving around a lot more than usual?  0   Thoughts that you would be better off dead, or of hurting yourself in some way?  0   PHQ-9 Total Score  0   If you checked off any problems, how difficult have these problems made it for you to do your work, take care of things at home, or get along with other people?  0       Assessment/Plan     Problem List Items Addressed This Visit         Respiratory    Moderate persistent asthma without complication    Relevant Medications    cetirizine (zyrTEC) 10 MG tablet    montelukast (SINGULAIR) 10 MG tablet    albuterol (PROVENTIL HFA;VENTOLIN HFA) 108 (90 Base) MCG/ACT inhaler    guaifenesin (ROBITUSSIN) 100 MG/5ML liquid    Allergic rhinitis - Primary    Relevant Medications    cetirizine (zyrTEC) 10 MG tablet    montelukast (SINGULAIR) 10 MG tablet       Nervous and Auditory    Acute allergic mucoid otitis media, bilateral    Relevant Medications    azithromycin (ZITHROMAX Z-NATY) 250 MG tablet    neomycin-polymyxin-hydrocortisone (CORTISPORIN) 1 % solution otic solution      Other Visit Diagnoses     Other allergic rhinitis        refill routine medications  allergy precautions      Relevant Medications    cetirizine (zyrTEC) 10 MG tablet    montelukast (SINGULAIR) 10 MG tablet          Complement reworked physical performed today.    Asthma precaution and preventions reviewed and discussed.  Avoid known triggers.  Recommended she start a combination inhaled corticosteroid and long-acting beta agonist which patient declines at this time.  Encouraged her to keep her albuterol inhaler with her at all times and use as needed.    Fluids, rest, symptomatic treatment advised. Steam therapy. Dispose of tooth bush after 24 hours of starting antibiotics if ordered. Complete antibiotics as ordered.  Discussed possible side effects/interactions of medication. Discussed symptoms to report as well as reasons to seek urgent or emergent medical attention. Understanding stated.   Recommend follow up in 2-3 days if not improved, sooner if needed.        Patient's Body mass index is 26.76 kg/m². BMI is above normal parameters. Recommendations include: exercise counseling and nutrition counseling.    Refill routine medications.  Instructed patient that she may use some over-the-counter Benadryl on an as-needed basis to help with allergy symptoms which may be beneficial to  dry up some of her sinus drainage.    Lactation precautions discussed and reviewed.    I have discussed diagnosis in detail today allowing time for questions and answers. Pt is aware of reasons to seek urgent or emergent medical care as well as reasons to return to the clinic for evaluation. Possible side effects, interactions and progression of symptoms discussed as well. Pt / family states understanding.   Emotional support and active listening provided.       RTC 2-5 days if not improved, sooner if condition worsens/changes. Symptomatic care advised as well as reasons for urgent or emergent care. Pt / family state understanding.     Recommending routine follow-up every 3 months, sooner if needed.    Errors in dictation may reflect use of voice recognition software and not all errors in transcription may have been detected prior to signing.           This document has been electronically signed by:  NABEEL Hill, NP-C

## 2019-01-02 ENCOUNTER — OFFICE VISIT (OUTPATIENT)
Dept: FAMILY MEDICINE CLINIC | Facility: CLINIC | Age: 34
End: 2019-01-02

## 2019-01-02 VITALS
TEMPERATURE: 97.8 F | BODY MASS INDEX: 26.73 KG/M2 | WEIGHT: 156.6 LBS | OXYGEN SATURATION: 98 % | SYSTOLIC BLOOD PRESSURE: 100 MMHG | HEART RATE: 79 BPM | HEIGHT: 64 IN | DIASTOLIC BLOOD PRESSURE: 72 MMHG

## 2019-01-02 DIAGNOSIS — J45.40 MODERATE PERSISTENT ASTHMA WITHOUT COMPLICATION: Primary | ICD-10-CM

## 2019-01-02 DIAGNOSIS — G44.229 CHRONIC TENSION-TYPE HEADACHE, NOT INTRACTABLE: ICD-10-CM
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
## 2019-01-02 DIAGNOSIS — J30.89 OTHER ALLERGIC RHINITIS: ICD-10-CM

## 2019-01-02 DIAGNOSIS — J06.9 ACUTE URI: ICD-10-CM

## 2019-01-02 DIAGNOSIS — M62.830 MUSCLE SPASM OF BACK: ICD-10-CM

## 2019-01-02 DIAGNOSIS — F43.81 GRIEF REACTION WITH PROLONGED BEREAVEMENT: ICD-10-CM

## 2019-01-02 DIAGNOSIS — J30.2 SEASONAL ALLERGIC RHINITIS, UNSPECIFIED TRIGGER: ICD-10-CM
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
## 2019-01-02 PROCEDURE — 99214 OFFICE O/P EST MOD 30 MIN: CPT | Performed by: NURSE PRACTITIONER

## 2019-01-02 RX ORDER — IBUPROFEN 600 MG/1
600 TABLET ORAL EVERY 6 HOURS PRN
Qty: 40 TABLET | Refills: 1 | Status: SHIPPED | OUTPATIENT
Start: 2019-01-02 | End: 2019-04-11 | Stop reason: SDUPTHER

## 2019-01-02 RX ORDER — CETIRIZINE HYDROCHLORIDE 10 MG/1
10 TABLET ORAL NIGHTLY
Qty: 30 TABLET | Refills: 5 | Status: SHIPPED | OUTPATIENT
Start: 2019-01-02 | End: 2019-08-15 | Stop reason: SDUPTHER

## 2019-01-02 RX ORDER — ALBUTEROL SULFATE 90 UG/1
2 AEROSOL, METERED RESPIRATORY (INHALATION) EVERY 4 HOURS PRN
Qty: 1 INHALER | Refills: 5 | Status: SHIPPED | OUTPATIENT
Start: 2019-01-02 | End: 2019-08-15 | Stop reason: SDUPTHER

## 2019-01-02 RX ORDER — MONTELUKAST SODIUM 10 MG/1
10 TABLET ORAL NIGHTLY
Qty: 30 TABLET | Refills: 5 | Status: SHIPPED | OUTPATIENT
Start: 2019-01-02 | End: 2019-08-15 | Stop reason: SDUPTHER

## 2019-01-02 NOTE — PROGRESS NOTES
Subjective   Zhanna Veras is a 33 y.o. female.     Chief Complaint   Patient presents with   • Follow-up       History of Present Illness:    Grief-death of her father this year.  Finding self  crying quite often.  Failed antidepressants in the past.  Has not tried counseling.    Moderate persistent asthma-symptoms are fairly stable at this time    Headache/daily for the past few months-noticed after her father .  She does have an infant that she is breast-feeding.  She has 3 young boys and one infant daughter, works full-time.  Not getting very much sleep.  Does report that showed an eye exam approximately 6 months ago.  Ibuprofen does seem to help with her headaches.  Like to know if there's anything she can do for headache prevention that will not affect her breast-feeding.  Headaches are frontal and viselike.  Are occurring daily.  Motrin is helpful as well as rest or sleep.    Patient reports an stent where one of her eyes became dilated and stinging after her children somehow got her 's Rogaine on her contact.  This resolved with new contacts.    Acute URI symptoms present ×1 week.  Several of her children have had upper respiratory infections and even strep over the last week.  She is tired, has a sore throat and has been having headaches.      The following portions of the patient's history were reviewed and updated as appropriate:  Allergies, current medications, past family history, past medical history, past social history, past surgical history and problem list.    Review of Systems   Constitutional: Positive for fatigue. Negative for activity change, appetite change and unexpected weight change.   HENT: Positive for sore throat and trouble swallowing. Negative for congestion, ear pain and mouth sores.    Eyes: Negative for redness and visual disturbance.   Respiratory: Positive for cough. Negative for chest tightness, shortness of breath and wheezing.    Cardiovascular: Negative for chest  "pain and palpitations.   Gastrointestinal: Negative for abdominal pain, blood in stool, constipation, diarrhea, nausea and vomiting.   Endocrine: Negative.    Genitourinary: Negative for difficulty urinating and dysuria.   Musculoskeletal: Positive for back pain.   Allergic/Immunologic: Positive for environmental allergies.   Neurological: Positive for headaches. Negative for seizures, facial asymmetry and speech difficulty.   Hematological: Negative.    Psychiatric/Behavioral: Positive for sleep disturbance. Negative for decreased concentration and suicidal ideas. The patient is not nervous/anxious.        Objective     /72   Pulse 79   Temp 97.8 °F (36.6 °C)   Ht 162.6 cm (64\")   Wt 71 kg (156 lb 9.6 oz)   SpO2 98%   BMI 26.88 kg/m²   Admission on 05/15/2018, Discharged on 05/17/2018   Component Date Value Ref Range Status   • WBC 05/15/2018 9.86  4.50 - 12.50 10*3/mm3 Final   • RBC 05/15/2018 3.97* 4.20 - 5.40 10*6/mm3 Final   • Hemoglobin 05/15/2018 12.1  12.0 - 16.0 g/dL Final   • Hematocrit 05/15/2018 36.6* 37.0 - 47.0 % Final   • MCV 05/15/2018 92.2  80.0 - 94.0 fL Final   • MCH 05/15/2018 30.5  27.0 - 33.0 pg Final   • MCHC 05/15/2018 33.1  33.0 - 37.0 g/dL Final   • RDW 05/15/2018 14.8* 11.5 - 14.5 % Final   • RDW-SD 05/15/2018 49.6  37.0 - 54.0 fl Final   • MPV 05/15/2018 11.2* 6.0 - 10.0 fL Final   • Platelets 05/15/2018 151  130 - 400 10*3/mm3 Final   • ABO Type 05/15/2018 O   Final   • RH type 05/15/2018 Positive   Final   • Antibody Screen 05/15/2018 Negative   Final   • T&S Expiration Date 05/15/2018 5/18/2018 11:59:59 PM   Final   • WBC 05/16/2018 13.82* 4.50 - 12.50 10*3/mm3 Final   • RBC 05/16/2018 4.00* 4.20 - 5.40 10*6/mm3 Final   • Hemoglobin 05/16/2018 12.5  12.0 - 16.0 g/dL Final   • Hematocrit 05/16/2018 37.6  37.0 - 47.0 % Final   • MCV 05/16/2018 94.0  80.0 - 94.0 fL Final   • MCH 05/16/2018 31.3  27.0 - 33.0 pg Final   • MCHC 05/16/2018 33.2  33.0 - 37.0 g/dL Final   • RDW " 05/16/2018 14.9* 11.5 - 14.5 % Final   • RDW-SD 05/16/2018 48.7  37.0 - 54.0 fl Final   • MPV 05/16/2018 11.3* 6.0 - 10.0 fL Final   • Platelets 05/16/2018 149  130 - 400 10*3/mm3 Final   • Neutrophil % 05/16/2018 80.7* 30.0 - 70.0 % Final   • Lymphocyte % 05/16/2018 11.6* 21.0 - 51.0 % Final   • Monocyte % 05/16/2018 6.9  0.0 - 10.0 % Final   • Eosinophil % 05/16/2018 0.3  0.0 - 5.0 % Final   • Basophil % 05/16/2018 0.1  0.0 - 2.0 % Final   • Immature Grans % 05/16/2018 0.4  0.0 - 0.5 % Final   • Neutrophils, Absolute 05/16/2018 11.15* 1.40 - 6.50 10*3/mm3 Final   • Lymphocytes, Absolute 05/16/2018 1.61  1.00 - 3.00 10*3/mm3 Final   • Monocytes, Absolute 05/16/2018 0.95* 0.10 - 0.90 10*3/mm3 Final   • Eosinophils, Absolute 05/16/2018 0.04  0.00 - 0.70 10*3/mm3 Final   • Basophils, Absolute 05/16/2018 0.02  0.00 - 0.30 10*3/mm3 Final   • Immature Grans, Absolute 05/16/2018 0.05* 0.00 - 0.03 10*3/mm3 Final       Physical Exam   Constitutional: She is oriented to person, place, and time. Vital signs are normal. She appears well-developed and well-nourished. She has a sickly appearance. No distress.   Appears acutely ill.  A very pleasant 33-year-old female.   HENT:   Head: Normocephalic.   Right Ear: Hearing and external ear normal. No lacerations. No drainage or swelling. No foreign bodies. No mastoid tenderness. A middle ear effusion is present.   Left Ear: Hearing and external ear normal. No lacerations. No drainage or swelling. No foreign bodies. No mastoid tenderness. A middle ear effusion is present.   Nose: Nose normal. No nose lacerations, sinus tenderness or nasal deformity. No epistaxis.  No foreign bodies.   Mouth/Throat: Uvula is midline. Oropharyngeal exudate present. No tonsillar abscesses.   TM's are cloudy bilateral   Eyes: Conjunctivae and EOM are normal. Pupils are equal, round, and reactive to light. Right eye exhibits no discharge. Left eye exhibits no discharge.   Fundoscopic exam:       The right  eye shows no AV nicking, no exudate, no hemorrhage and no papilledema. The right eye shows red reflex.        The left eye shows no AV nicking, no exudate, no hemorrhage and no papilledema. The left eye shows red reflex.   Neck: Normal range of motion. Neck supple. No tracheal deviation present. No thyromegaly present.   Cardiovascular: Normal rate, regular rhythm, normal heart sounds and intact distal pulses. Exam reveals no gallop and no friction rub.   No murmur heard.  Pulmonary/Chest: Effort normal and breath sounds normal. No respiratory distress. She has no wheezes. She has no rales. She exhibits no tenderness.   Cough noted    Abdominal: Soft. Bowel sounds are normal. She exhibits no distension and no mass. There is no tenderness. There is no rebound and no guarding.   Musculoskeletal: Normal range of motion.   Lymphadenopathy:     She has cervical adenopathy.        Right cervical: Superficial cervical adenopathy present. No deep cervical adenopathy present.       Left cervical: Superficial cervical adenopathy present. No deep cervical adenopathy present.   Neurological: She is alert and oriented to person, place, and time. She has normal reflexes.   CN 2-12 grossly intact    Skin: Skin is warm and dry. No rash noted. She is not diaphoretic. No erythema.   Psychiatric: She has a normal mood and affect. Her speech is normal and behavior is normal. Judgment and thought content normal. Cognition and memory are normal.   Tearful when discussing the death of her father and feeling overwhelmed at times.   Vitals reviewed.      Assessment/Plan     Problem List Items Addressed This Visit        Respiratory    Moderate persistent asthma without complication - Primary    Relevant Medications    albuterol sulfate  (90 Base) MCG/ACT inhaler    cetirizine (zyrTEC) 10 MG tablet    montelukast (SINGULAIR) 10 MG tablet    Other Relevant Orders    CBC & Differential    Comprehensive Metabolic Panel    TSH     Hemoglobin A1c    Vitamin D 25 Hydroxy    Lipid Panel    Vitamin B12    Iron and TIBC    Allergic rhinitis    Relevant Medications    cetirizine (zyrTEC) 10 MG tablet    montelukast (SINGULAIR) 10 MG tablet       Nervous and Auditory    Chronic tension-type headache, not intractable    Relevant Medications    ibuprofen (ADVIL,MOTRIN) 600 MG tablet       Other    Grief reaction with prolonged bereavement      Other Visit Diagnoses     Other allergic rhinitis        refill routine medications  allergy precautions      Relevant Medications    cetirizine (zyrTEC) 10 MG tablet    montelukast (SINGULAIR) 10 MG tablet    Acute URI        Relevant Orders    Ambulatory Referral to Optometry (Completed)            [facility] 1 as directed.  Recommend optometry consult due to recurring headaches.  Fasting labs in 6 weeks, follow-up after labs.  Recommend grief counseling with licensed certified  in 2 to difficulty coping with the death of her father.  Discussed prophylactic headache treatment, patient wishes to continue breast-feeding and avoid any additional medications at this time.  Discussed stress management.  Keep headache diary.  Stress reduction methods discussed.       Patient's Body mass index is 26.88 kg/m². BMI is above normal parameters. Recommendations include: exercise counseling and nutrition counseling.    I have discussed diagnosis in detail today allowing time for questions and answers. Patient is aware of reasons to seek urgent or emergent medical care as well as reasons to return to the clinic for evaluation. Possible side effects, interactions and progression of symptoms discussed as well. Patient / family states understanding.   Emotional support and active listening provided.     Recommend patient follow up in 2-4 weeks, sooner if needed.  Fasting labs prior to that appointment.    Dictated utilizing Dragon dictation        This document has been electronically signed by:  Gali Masters  APRN, NP-C

## 2019-01-03 PROBLEM — F43.29 GRIEF REACTION WITH PROLONGED BEREAVEMENT: Status: ACTIVE | Noted: 2019-01-03

## 2019-01-03 PROBLEM — F43.81 GRIEF REACTION WITH PROLONGED BEREAVEMENT: Status: ACTIVE | Noted: 2019-01-03

## 2019-01-03 PROBLEM — G44.229 CHRONIC TENSION-TYPE HEADACHE, NOT INTRACTABLE: Status: ACTIVE | Noted: 2019-01-03

## 2019-01-03 RX ORDER — AZITHROMYCIN 250 MG/1
TABLET, FILM COATED ORAL
Qty: 6 TABLET | Refills: 0 | Status: SHIPPED | OUTPATIENT
Start: 2019-01-03 | End: 2019-02-18

## 2019-01-17 ENCOUNTER — OFFICE VISIT (OUTPATIENT)
Dept: RETAIL CLINIC | Facility: CLINIC | Age: 34
End: 2019-01-17

## 2019-01-17 VITALS
RESPIRATION RATE: 18 BRPM | WEIGHT: 156.4 LBS | BODY MASS INDEX: 26.85 KG/M2 | TEMPERATURE: 98.8 F | OXYGEN SATURATION: 98 % | HEART RATE: 84 BPM

## 2019-01-17 DIAGNOSIS — J02.9 SORE THROAT: Primary | ICD-10-CM

## 2019-01-17 LAB
EXPIRATION DATE: NORMAL
INTERNAL CONTROL: NORMAL
Lab: NORMAL
S PYO AG THROAT QL: NEGATIVE

## 2019-01-17 PROCEDURE — 87880 STREP A ASSAY W/OPTIC: CPT | Performed by: NURSE PRACTITIONER

## 2019-01-17 PROCEDURE — 99213 OFFICE O/P EST LOW 20 MIN: CPT | Performed by: NURSE PRACTITIONER

## 2019-01-17 NOTE — PROGRESS NOTES
JOSUE Veras is a 33 y.o. female.   Chief Complaint   Patient presents with   • Sore Throat      Sore Throat    This is a new problem. Episode onset: past 5 days. The problem has been gradually worsening. The pain is worse on the right side. There has been no fever. Pertinent negatives include no abdominal pain, congestion, coughing, diarrhea, ear discharge, ear pain, headaches, hoarse voice, neck pain, shortness of breath, swollen glands or vomiting. She has had exposure to strep. She has tried acetaminophen for the symptoms. The treatment provided mild relief.     She is currently nursing a 8 month old infant.     The following portions of the patient's history were reviewed and updated as appropriate: allergies, current medications, past family history, past medical history, past social history, past surgical history and problem list.    Current Outpatient Medications:   •  albuterol sulfate  (90 Base) MCG/ACT inhaler, Inhale 2 puffs Every 4 (Four) Hours As Needed for Shortness of Air., Disp: 1 inhaler, Rfl: 5  •  azithromycin (ZITHROMAX Z-NATY) 250 MG tablet, Take 2 tablets the first day, then 1 tablet daily for 4 days., Disp: 6 tablet, Rfl: 0  •  azithromycin (ZITHROMAX Z-NATY) 250 MG tablet, Take 2 tablets the first day, then 1 tablet daily for 4 days., Disp: 6 tablet, Rfl: 0  •  cetirizine (zyrTEC) 10 MG tablet, Take 1 tablet by mouth Every Night., Disp: 30 tablet, Rfl: 5  •  guaifenesin (ROBITUSSIN) 100 MG/5ML liquid, Take 10 mL by mouth 3 (Three) Times a Day As Needed for Cough., Disp: 236 mL, Rfl: 0  •  ibuprofen (ADVIL,MOTRIN) 600 MG tablet, Take 1 tablet by mouth Every 6 (Six) Hours As Needed for Mild Pain ., Disp: 40 tablet, Rfl: 1  •  montelukast (SINGULAIR) 10 MG tablet, Take 1 tablet by mouth Every Night., Disp: 30 tablet, Rfl: 5  •  neomycin-polymyxin-hydrocortisone (CORTISPORIN) 1 % solution otic solution, Administer 3 drops into both ears 4 (Four) Times a Day., Disp: 1  bottle, Rfl: 0  •  Prenatal Vit-Fe Fumarate-FA (PRENATAL VITAMIN 27-0.8) 27-0.8 MG tablet tablet, Take 1 tablet by mouth Daily. (Patient taking differently: Take 1 tablet by mouth Every Night.), Disp: 30 tablet, Rfl: 5  •  raNITIdine (ZANTAC) 150 MG tablet, Take 1 tablet by mouth Every Night. (Patient taking differently: Take 150 mg by mouth 2 (Two) Times a Day.), Disp: 60 tablet, Rfl: 5    Allergies   Allergen Reactions   • Penicillins Unknown (See Comments)     PT'S MOTHER TOLD HER SHE WAS ALLERGIC     Review of Systems   Constitutional: Negative for activity change, appetite change, fatigue and fever.   HENT: Positive for postnasal drip, rhinorrhea and sore throat. Negative for congestion, ear discharge, ear pain and hoarse voice.    Eyes: Negative for discharge.   Respiratory: Negative for cough and shortness of breath.    Gastrointestinal: Negative for abdominal pain, diarrhea, nausea and vomiting.   Musculoskeletal: Negative for myalgias and neck pain.   Skin: Negative for color change, pallor and rash.   Allergic/Immunologic: Negative for immunocompromised state.   Neurological: Negative for headaches.   Psychiatric/Behavioral: Negative for sleep disturbance.     Objective     Visit Vitals  Pulse 84   Temp 98.8 °F (37.1 °C)   Resp 18   Wt 70.9 kg (156 lb 6.4 oz)   SpO2 98%   Breastfeeding? Yes Comment: 8 month old infant   BMI 26.85 kg/m²     Physical Exam   Constitutional: She appears well-developed and well-nourished.   HENT:   Head: Normocephalic.   Right Ear: Tympanic membrane normal. Tympanic membrane is not perforated and not erythematous.   Left Ear: Tympanic membrane is not perforated and not erythematous.   Nose: Mucosal edema and rhinorrhea present.   Mouth/Throat: Mucous membranes are normal. Posterior oropharyngeal erythema (mild) present. No oropharyngeal exudate. Tonsils are 1+ on the right. Tonsils are 1+ on the left. No tonsillar exudate.   Eyes: Conjunctivae are normal. Pupils are equal,  round, and reactive to light.   Neck: Normal range of motion.   Cardiovascular: Normal rate and regular rhythm.   Pulmonary/Chest: Effort normal and breath sounds normal.   Abdominal: Soft. Bowel sounds are normal.   Lymphadenopathy:     She has no cervical adenopathy.   Neurological: She is alert.   Skin: Skin is warm and dry.   Psychiatric: She has a normal mood and affect.     Lab Results (last 24 hours)     Procedure Component Value Units Date/Time    POC Rapid Strep A [[id number]]  (Normal) Collected:  01/17/19 1838    Specimen:  Swab Updated:  01/17/19 1838     Rapid Strep A Screen Negative     Internal Control Passed     Lot Number [id number]     Expiration Date 5/31/20        Assessment/Plan   Zhanna was seen today for sore throat.    Diagnoses and all orders for this visit:    Sore throat  -     POC Rapid Strep A                 Discussed results of the POC strep screen, negative. Reviewed conservative measures ie warm salt water gargles, increase po fluid intake.  AVS reviewed with patient, understanding verbalized and agrees with treatment plan.  Follow up with primary care provider if no improvement within next several days. Go to [facility] [place] or ER if condition worsens.

## 2019-01-21 ENCOUNTER — OFFICE VISIT (OUTPATIENT)
Dept: PSYCHIATRY | Facility: CLINIC | Age: 34
End: 2019-01-21

## 2019-01-21 DIAGNOSIS — F43.81 GRIEF REACTION WITH PROLONGED BEREAVEMENT: Primary | ICD-10-CM

## 2019-01-21 DIAGNOSIS — F41.9 ANXIETY DISORDER, UNSPECIFIED TYPE: ICD-10-CM

## 2019-01-21 PROCEDURE — 90791 PSYCH DIAGNOSTIC EVALUATION: CPT | Performed by: SOCIAL WORKER
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
## 2019-02-04 ENCOUNTER — OFFICE VISIT (OUTPATIENT)
Dept: PSYCHIATRY | Facility: CLINIC | Age: 34
End: 2019-02-04

## 2019-02-04 DIAGNOSIS — F43.81 GRIEF REACTION WITH PROLONGED BEREAVEMENT: Primary | ICD-10-CM

## 2019-02-04 DIAGNOSIS — F41.9 ANXIETY DISORDER, UNSPECIFIED TYPE: ICD-10-CM

## 2019-02-04 PROCEDURE — 90837 PSYTX W PT 60 MINUTES: CPT | Performed by: SOCIAL WORKER

## 2019-02-06 NOTE — PROGRESS NOTES
Date of Service: February 4, 2019  Time In: 3:55 PM  Time Out: 4:47 PM      PROGRESS NOTE  Data:  Zhanna Veras is a 33 y.o. female who met 1:1 with the undersigned for a regularly scheduled individual outpatient therapy session at [facility] for follow-up of grief.      HPI: Patient reports she feels things are going relatively well but states she continues to have grief concerning the recent death of her father and states she continues to think about him constantly, have periodic crying spells, and have significant regret for not spending more time with him.  Patient also reports she has significant stressors in her life including caring for her children, working full-time job, and going to school.  Patient reports symptoms indicative of anxiety including feeling on edge, feeling overwhelmed, trembling, and a sense of impending doom.  Patient rates current symptoms of anxiety at 30 scale of 1-10 with 10 being most severe.  Patient reports she continues to comply with all recommendations a primary care provider's office.  Patient also adamantly convincingly denies suicidal ideation vehemently denies any substance use.      Clinical Maneuvering/Intervention:  Assisted patient in processing above session content; acknowledged and normalized patient’s thoughts, feelings, and concerns.  Discussed the therapist/patient relationship and explain the parameters and limitations of relative confidentiality.  Also discussed the importance of regular attendance, active participation, and honesty to the treatment process.  Allow the patient to discuss/process her feelings concerning the recent death of her father validated her feelings.  Also provided the patient with a brief handout ongrief and encouraged her to consider finding a way to work through her feelings either keeping a journal or writing a letter to her father.  Also discussed the importance of healthy skills of daily living and  encouraged the patient actively seek enjoyable activities to engage on a regular basis.  Provided unconditional positive regard in a safe, supportive environment.    Allowed patient to freely discuss issues without interruption or judgment. Provided safe, confidential environment to facilitate the development of positive therapeutic relationship and encourage open, honest communication. Assisted patient in identifying risk factors which would indicate the need for higher level of care including thoughts to harm self or others and/or self-harming behavior and encouraged patient to contact this office, call 911, or present to the nearest emergency room should any of these events occur. Discussed crisis intervention services and means to access.  Patient adamantly and convincingly denies current suicidal or homicidal ideation or perceptual disturbance.    Assessment     Diagnoses and all orders for this visit:    Grief reaction with prolonged bereavement    Anxiety disorder, unspecified type               Mental Status Exam  Hygiene:  good  Dress:  casual  Attitude:  Cooperative  Motor Activity:  Appropriate  Speech:  Normal  Mood:  within normal limits  Affect:  calm and pleasant  Thought Processes:  Linear  Thought Content:  normal  Suicidal Thoughts:  denies  Homicidal Thoughts:  denies  Crisis Safety Plan: yes, to come to the emergency room.  Hallucinations:  denies    Patient's Support Network Includes:  , children and extended family    Progress toward goal: Not at goal    Functional Status: Mild impairment     Prognosis: Good with Ongoing Treatment       Plan         Patient will continue in individual outpatient therapy session [facility] in approximately 4 weeks and will continue in pharmacotherapy as scheduled with NABEEL Cobos.  Patient will adhere to medication regimen as prescribed and report any side effects. Patient will contact this office, call 911 or present to  the nearest emergency room should suicidal or homicidal ideations occur. Provide Cognitive Behavioral Therapy and Integrative Therapy to improve functioning, maintain stability, and avoid decompensation and the need for higher level of care.          Return in about 4 weeks (around 3/4/2019) for Next scheduled follow up.      This document signed by Cordell Tyler LCSW, [facility] February 6, 2019 5:38 PM

## 2019-02-08 ENCOUNTER — LAB (OUTPATIENT)
Dept: FAMILY MEDICINE CLINIC | Facility: CLINIC | Age: 34
End: 2019-02-08

## 2019-02-08 DIAGNOSIS — J45.40 MODERATE PERSISTENT ASTHMA WITHOUT COMPLICATION: ICD-10-CM

## 2019-02-08 LAB
25(OH)D3+25(OH)D2 SERPL-MCNC: 34 NG/ML
ALBUMIN SERPL-MCNC: 4.8 G/DL (ref 3.5–5)
ALBUMIN/GLOB SERPL: 1.9 G/DL (ref 1.5–2.5)
ALP SERPL-CCNC: 83 U/L (ref 35–104)
ALT SERPL-CCNC: 22 U/L (ref 10–36)
AST SERPL-CCNC: 24 U/L (ref 10–30)
BASOPHILS # BLD AUTO: 0.03 10*3/MM3 (ref 0–0.3)
BASOPHILS NFR BLD AUTO: 0.7 % (ref 0–2)
BILIRUB SERPL-MCNC: 0.6 MG/DL (ref 0.2–1.8)
BUN SERPL-MCNC: 20 MG/DL (ref 7–21)
BUN/CREAT SERPL: 37.7 (ref 7–25)
CALCIUM SERPL-MCNC: 9.4 MG/DL (ref 7.7–10)
CHLORIDE SERPL-SCNC: 106 MMOL/L (ref 99–112)
CHOLEST SERPL-MCNC: 206 MG/DL (ref 0–200)
CO2 SERPL-SCNC: 26.6 MMOL/L (ref 24.3–31.9)
CREAT SERPL-MCNC: 0.53 MG/DL (ref 0.43–1.29)
EOSINOPHIL # BLD AUTO: 0.12 10*3/MM3 (ref 0–0.7)
EOSINOPHIL NFR BLD AUTO: 2.7 % (ref 0–5)
ERYTHROCYTE [DISTWIDTH] IN BLOOD BY AUTOMATED COUNT: 12.3 % (ref 11.5–14.5)
GLOBULIN SER CALC-MCNC: 2.5 GM/DL
GLUCOSE SERPL-MCNC: 89 MG/DL (ref 70–110)
HBA1C MFR BLD: 5 % (ref 4.5–5.7)
HCT VFR BLD AUTO: 37.8 % (ref 37–47)
HDLC SERPL-MCNC: 44 MG/DL (ref 60–100)
HGB BLD-MCNC: 12.9 G/DL (ref 12–16)
IMM GRANULOCYTES # BLD AUTO: 0.01 10*3/MM3 (ref 0–0.03)
IMM GRANULOCYTES NFR BLD AUTO: 0.2 % (ref 0–0.5)
IRON SATN MFR SERPL: 33 % (ref 15–50)
IRON SERPL-MCNC: 96 MCG/DL (ref 49–151)
LDLC SERPL CALC-MCNC: 133 MG/DL (ref 0–100)
LYMPHOCYTES # BLD AUTO: 1.47 10*3/MM3 (ref 1–3)
LYMPHOCYTES NFR BLD AUTO: 33.3 % (ref 21–51)
MCH RBC QN AUTO: 30.4 PG (ref 27–33)
MCHC RBC AUTO-ENTMCNC: 34.1 G/DL (ref 33–37)
MCV RBC AUTO: 89.2 FL (ref 80–94)
MONOCYTES # BLD AUTO: 0.24 10*3/MM3 (ref 0.1–0.9)
MONOCYTES NFR BLD AUTO: 5.4 % (ref 0–10)
NEUTROPHILS # BLD AUTO: 2.55 10*3/MM3 (ref 1.4–6.5)
NEUTROPHILS NFR BLD AUTO: 57.7 % (ref 30–70)
PLATELET # BLD AUTO: 211 10*3/MM3 (ref 130–400)
POTASSIUM SERPL-SCNC: 3.9 MMOL/L (ref 3.5–5.3)
PROT SERPL-MCNC: 7.3 G/DL (ref 6–8)
RBC # BLD AUTO: 4.24 10*6/MM3 (ref 4.2–5.4)
SODIUM SERPL-SCNC: 140 MMOL/L (ref 135–153)
TIBC SERPL-MCNC: 293 MCG/DL (ref 241–421)
TRIGL SERPL-MCNC: 146 MG/DL (ref 0–150)
TSH SERPL DL<=0.005 MIU/L-ACNC: 1.68 MIU/ML (ref 0.55–4.78)
UIBC SERPL-MCNC: 197 MCG/DL
VIT B12 SERPL-MCNC: 775 PG/ML (ref 211–911)
VLDLC SERPL CALC-MCNC: 29.2 MG/DL
WBC # BLD AUTO: 4.42 10*3/MM3 (ref 4.5–12.5)

## 2019-02-18 ENCOUNTER — OFFICE VISIT (OUTPATIENT)
Dept: FAMILY MEDICINE CLINIC | Facility: CLINIC | Age: 34
End: 2019-02-18

## 2019-02-18 VITALS
TEMPERATURE: 98.7 F | BODY MASS INDEX: 26.12 KG/M2 | HEIGHT: 64 IN | SYSTOLIC BLOOD PRESSURE: 128 MMHG | WEIGHT: 153 LBS | OXYGEN SATURATION: 98 % | DIASTOLIC BLOOD PRESSURE: 80 MMHG | HEART RATE: 95 BPM

## 2019-02-18 DIAGNOSIS — M62.830 MUSCLE SPASM OF BACK: Primary | ICD-10-CM
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
## 2019-02-18 PROCEDURE — 99214 OFFICE O/P EST MOD 30 MIN: CPT | Performed by: NURSE PRACTITIONER

## 2019-02-18 RX ORDER — METHOCARBAMOL 750 MG/1
750 TABLET, FILM COATED ORAL 2 TIMES DAILY PRN
Qty: 20 TABLET | Refills: 1 | Status: SHIPPED | OUTPATIENT
Start: 2019-02-18 | End: 2019-06-13

## 2019-02-18 NOTE — PROGRESS NOTES
Chief Complaint: COPD    HPI: 61-year-old male here for four-week follow-up. Last visit was seen in ER follow-up for COPD. He was started on a combination of long-acting bronchodilators in the form of Anoro.. He had pulmonary function tests which revealed changes consistent with the pathophysiology of stage III COPD.  He had been offered pneumococcal 23, pulmonary rehabilitation and a CT lung screening with the rationale behind each of these and he had declined at the last visit.  Today he returns to the clinic feeling    MMRC-1    CAT score-6    Still smoke free  PAST MEDICAL HISTORY:  No past medical history on file.    PAST SURGERY HISTORY:  No past surgical history on file.    FAMILY HISTORY:  No family history on file.    SOCIAL HISTORY:  Social History     Social History   • Marital status:      Spouse name: N/A   • Number of children: N/A   • Years of education: N/A     Occupational History   • Not on file.     Social History Main Topics   • Smoking status: Former Smoker     Packs/day: 2.00     Years: 42.00     Types: Cigarettes     Quit date: 11/26/2017   • Smokeless tobacco: Never Used      Comment: States trying to quit   • Alcohol use Yes   • Drug use: Unknown   • Sexual activity: Not on file     Other Topics Concern   • Not on file     Social History Narrative   • No narrative on file       ALLERGIES:  ALLERGIES:  No Known Allergies    CURRENT MEDDICATIONS  Current Outpatient Prescriptions   Medication   • albuterol 108 (90 Base) MCG/ACT inhaler   • lisinopril (PRINIVIL,ZESTRIL) 40 MG tablet   • atorvastatin (LIPITOR) 40 MG tablet   • metoPROLOL (LOPRESSOR) 50 MG tablet   • aspirin 81 MG tablet   • doxycycline monohydrate (ADOXA) 100 MG tablet   • predniSONE (DELTASONE) 20 MG tablet   • azithromycin (ZITHROMAX Z-ASHOK) 250 MG tablet   • benzonatate (TESSALON) 200 MG capsule     No current facility-administered medications for this visit.        PHYSICAL EXAMINATION:   VITAL SIGNS:  There were no  Subjective   Zhanna Veras is a 33 y.o. female.     Chief Complaint   Patient presents with   • Fall       History of Present Illness:      Fall Saturday morning when she got out of bed and fell over her child Legos.  Reports she landed on her right shoulder and elbow as well as her right hip.  She is very sore.  Has bruising.  She has tried ibuprofen.  Does feel muscle tightness.  Has not been to the emergency room or had x-rays.    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  Her child is 9 months ago and does eat baby food and regular food.      The following portions of the patient's history were reviewed and updated as appropriate:  Allergies, current medications, past family history, past medical history, past social history, past surgical history and problem list.    Review of Systems   Constitutional: Negative for appetite change, fatigue and unexpected weight change.   HENT: Negative for congestion, ear pain, nosebleeds, postnasal drip, rhinorrhea, sore throat, trouble swallowing and voice change.    Eyes: Negative for pain and visual disturbance.   Respiratory: Negative for cough, shortness of breath and wheezing.    Cardiovascular: Negative for chest pain and palpitations.   Gastrointestinal: Negative for abdominal pain, blood in stool, constipation and diarrhea.   Endocrine: Negative for cold intolerance and polydipsia.   Genitourinary: Negative for difficulty urinating, flank pain and hematuria.   Musculoskeletal: Positive for arthralgias. Negative for back pain, gait problem, joint swelling and myalgias.   Skin: Negative for color change and rash.   Allergic/Immunologic: Positive for environmental allergies. Negative for food allergies.   Neurological: Negative for dizziness, syncope, numbness and headaches.   Hematological: Negative.    Psychiatric/Behavioral: Negative for sleep disturbance and suicidal ideas.   All other systems  "reviewed and are negative.      Objective     /80   Pulse 95   Temp 98.7 °F (37.1 °C) (Temporal)   Ht 162.6 cm (64\")   Wt 69.4 kg (153 lb)   SpO2 98%   BMI 26.26 kg/m²   Lab on 02/08/2019   Component Date Value Ref Range Status   • TIBC 02/08/2019 293  241 - 421 mcg/dL Final   • UIBC 02/08/2019 197  mcg/dL Final   • Iron 02/08/2019 96  49 - 151 mcg/dL Final   • Iron Saturation 02/08/2019 33  15 - 50 % Final   • Vitamin B-12 02/08/2019 775  211 - 911 pg/mL Final   • Total Cholesterol 02/08/2019 206* 0 - 200 mg/dL Final   • Triglycerides 02/08/2019 146  0 - 150 mg/dL Final   • HDL Cholesterol 02/08/2019 44* 60 - 100 mg/dL Final   • VLDL Cholesterol 02/08/2019 29.2  mg/dL Final   • LDL Cholesterol  02/08/2019 133* 0 - 100 mg/dL Final   • 25 Hydroxy, Vitamin D 02/08/2019 34.0  ng/ml Final   • Hemoglobin A1C 02/08/2019 5.00  4.50 - 5.70 % Final   • TSH 02/08/2019 1.683  0.550 - 4.780 mIU/mL Final   • Glucose 02/08/2019 89  70 - 110 mg/dL Final   • BUN 02/08/2019 20  7 - 21 mg/dL Final   • Creatinine 02/08/2019 0.53  0.43 - 1.29 mg/dL Final   • eGFR Non  Am 02/08/2019 133  >60 mL/min/1.73 Final   • eGFR African Am 02/08/2019 >150  >60 mL/min/1.73 Final   • BUN/Creatinine Ratio 02/08/2019 37.7* 7.0 - 25.0 Final   • Sodium 02/08/2019 140  135 - 153 mmol/L Final   • Potassium 02/08/2019 3.9  3.5 - 5.3 mmol/L Final   • Chloride 02/08/2019 106  99 - 112 mmol/L Final   • Total CO2 02/08/2019 26.6  24.3 - 31.9 mmol/L Final   • Calcium 02/08/2019 9.4  7.7 - 10.0 mg/dL Final   • Total Protein 02/08/2019 7.3  6.0 - 8.0 g/dL Final   • Albumin 02/08/2019 4.80  3.50 - 5.00 g/dL Final   • Globulin 02/08/2019 2.5  gm/dL Final   • A/G Ratio 02/08/2019 1.9  1.5 - 2.5 g/dL Final   • Total Bilirubin 02/08/2019 0.6  0.2 - 1.8 mg/dL Final   • Alkaline Phosphatase 02/08/2019 83  35 - 104 U/L Final   • AST (SGOT) 02/08/2019 24  10 - 30 U/L Final   • ALT (SGPT) 02/08/2019 22  10 - 36 U/L Final   • WBC 02/08/2019 4.42* 4.50 - " vitals taken for this visit.,   There were no vitals filed for this visit., There is no height or weight on file to calculate BMI.  GENERAL: No acute distress, well-developed, well-nourished. Good eye contact. No clubbing/edema or cyanosis noted.  EYES: Pupils are equal, round and react to light. Extraocular movements are intact with normal conjunctivae.   HEENT: Normocephalic. Hearing is appropriate. Oral mucosa is moist. No pharyngeal erythema. No tenderness with palpation over the sinuses, frontal or maxillary.   NECK: Supple. Nontender to the touch with no obvious JVD, no   lymphadenopathy, cervical or supraclavicular noted. ROM is appropriate.  RESPIRATORY: Efforts posteriorly are diminished but clear. No wheezes or other adventitious sounds noted at this time. Symmetrical chest wall expansion is noted with a prolonged expiratory phase. Speech is non labored.No cough noted during the visit.  CARDIOVASCULAR: Reveals normal rate, regular rhythm, no murmurs or extra heart tones. No edema to the lower extremities.  GI: Soft, nontender to the touch, nondistended, with normal bowel sounds.   MUSCULOSKELETAL: Normal range of motion, normal strength, no deformities. Normal gait. Independent with mobility.   SKIN: Warm, moist, and intact. No rashes noted.   NEUROLOGIC: Alert and oriented x3, cooperative, appropriate mood and affect. Normal judgment. No focal deficits noted.     IMPRESSION AND PLAN:     Severe COPD   Doing very well.   Continue Anoro one inhaltion daily  Continue albuterol 2 puffs every 4 hours as needed  Pneumococcal 23- Has received.   CT chest lung screening - Declines understand risks involved will let us know in the future if he desires.  Pulmonary rehabilitation - HE declines has been walking and using a treadmill  Had declined alpha 1 testing.     Will FU in 6 months no testing.     Today I devoted more than 50% out of this 15 minute visit addressing questions, concerns, doing further education  12.50 10*3/mm3 Final   • RBC 02/08/2019 4.24  4.20 - 5.40 10*6/mm3 Final   • Hemoglobin 02/08/2019 12.9  12.0 - 16.0 g/dL Final   • Hematocrit 02/08/2019 37.8  37.0 - 47.0 % Final   • MCV 02/08/2019 89.2  80.0 - 94.0 fL Final   • MCH 02/08/2019 30.4  27.0 - 33.0 pg Final   • MCHC 02/08/2019 34.1  33.0 - 37.0 g/dL Final   • RDW 02/08/2019 12.3  11.5 - 14.5 % Final   • Platelets 02/08/2019 211  130 - 400 10*3/mm3 Final   • Neutrophil Rel % 02/08/2019 57.7  30.0 - 70.0 % Final   • Lymphocyte Rel % 02/08/2019 33.3  21.0 - 51.0 % Final   • Monocyte Rel % 02/08/2019 5.4  0.0 - 10.0 % Final   • Eosinophil Rel % 02/08/2019 2.7  0.0 - 5.0 % Final   • Basophil Rel % 02/08/2019 0.7  0.0 - 2.0 % Final   • Neutrophils Absolute 02/08/2019 2.55  1.40 - 6.50 10*3/mm3 Final   • Lymphocytes Absolute 02/08/2019 1.47  1.00 - 3.00 10*3/mm3 Final   • Monocytes Absolute 02/08/2019 0.24  0.10 - 0.90 10*3/mm3 Final   • Eosinophils Absolute 02/08/2019 0.12  0.00 - 0.70 10*3/mm3 Final   • Basophils Absolute 02/08/2019 0.03  0.00 - 0.30 10*3/mm3 Final   • Immature Granulocyte Rel % 02/08/2019 0.2  0.0 - 0.5 % Final   • Immature Grans Absolute 02/08/2019 0.01  0.00 - 0.03 10*3/mm3 Final       Physical Exam   Constitutional: She is oriented to person, place, and time. She appears well-developed and well-nourished.   HENT:   Head: Normocephalic.   Right Ear: External ear normal.   Left Ear: External ear normal.   Nose: Nose normal.   Mouth/Throat: Oropharynx is clear and moist.   Eyes: Pupils are equal, round, and reactive to light.   Neck: Normal range of motion. Neck supple. No tracheal deviation present. No thyromegaly present.   Cardiovascular: Normal rate, regular rhythm and normal heart sounds. Exam reveals no gallop and no friction rub.   No murmur heard.  Pulmonary/Chest: Effort normal and breath sounds normal. No respiratory distress. She has no wheezes. She has no rales. She exhibits no tenderness.   Abdominal: Soft. Bowel sounds are  and going in detail over my treatment and follow up plans.       Leonid Cardenas, YINAC   normal. She exhibits no distension and no mass. There is no tenderness. There is no rebound and no guarding.   Musculoskeletal: Normal range of motion.   Neurological: She is alert and oriented to person, place, and time. She has normal reflexes.   CN 2-12 grossly intact    Skin: Skin is warm and dry. Capillary refill takes less than 2 seconds. No rash noted. No erythema.        Psychiatric: She has a normal mood and affect. Her speech is normal and behavior is normal. Judgment and thought content normal. Cognition and memory are normal.       Assessment/Plan     Problem List Items Addressed This Visit        Musculoskeletal and Integument    Muscle spasm of back - Primary    Relevant Orders    XR Spine Lumbar 2 or 3 View    XR Hip With or Without Pelvis 2 - 3 View Right    XR Shoulder 2+ View Right    XR elbow 2 vw right    Ambulatory Referral to Physical Therapy Evaluate and treat (Completed)       Other    Fall at home, initial encounter    Relevant Orders    XR Spine Lumbar 2 or 3 View    XR Hip With or Without Pelvis 2 - 3 View Right    XR Shoulder 2+ View Right    XR elbow 2 vw right    Ambulatory Referral to Physical Therapy Evaluate and treat (Completed)            Xray order provided.   PT order.   Start muscle relaxer, pt understands breast feeding moderate risks. She will wait at least 12 hours after taking muscle relaxer to nurse and observe for any possible signs it is effecting her baby.   Follow up this week for results and evaluation.        Patient's Body mass index is 26.26 kg/m². BMI is above normal parameters. Recommendations include: exercise counseling and nutrition counseling.        I have discussed diagnosis in detail today allowing time for questions and answers. Patient is aware of reasons to seek urgent or emergent medical care as well as reasons to return to the clinic for evaluation. Possible side effects, interactions and progression of symptoms discussed as well. Patient / family  states understanding.   Emotional support and active listening provided.     Dictated utilizing Dragon dictation        This document has been electronically signed by:  NABEEL Hill, NP-C

## 2019-02-28 ENCOUNTER — OFFICE VISIT (OUTPATIENT)
Dept: FAMILY MEDICINE CLINIC | Facility: CLINIC | Age: 34
End: 2019-02-28

## 2019-02-28 VITALS
BODY MASS INDEX: 26.98 KG/M2 | DIASTOLIC BLOOD PRESSURE: 80 MMHG | HEIGHT: 64 IN | SYSTOLIC BLOOD PRESSURE: 120 MMHG | OXYGEN SATURATION: 98 % | TEMPERATURE: 99.3 F | HEART RATE: 96 BPM | WEIGHT: 158 LBS

## 2019-02-28 DIAGNOSIS — E55.9 VITAMIN D DEFICIENCY: ICD-10-CM

## 2019-02-28 DIAGNOSIS — E66.3 OVERWEIGHT (BMI 25.0-29.9): ICD-10-CM
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
## 2019-02-28 DIAGNOSIS — E78.2 MIXED HYPERLIPIDEMIA: ICD-10-CM

## 2019-02-28 DIAGNOSIS — M54.42 ACUTE BILATERAL LOW BACK PAIN WITH LEFT-SIDED SCIATICA: ICD-10-CM

## 2019-02-28 DIAGNOSIS — J45.40 MODERATE PERSISTENT ASTHMA WITHOUT COMPLICATION: ICD-10-CM

## 2019-02-28 DIAGNOSIS — M62.830 MUSCLE SPASM OF BACK: ICD-10-CM

## 2019-02-28 DIAGNOSIS — J02.9 ACUTE PHARYNGITIS, UNSPECIFIED ETIOLOGY: Primary | ICD-10-CM

## 2019-02-28 PROBLEM — N39.0 UTI (URINARY TRACT INFECTION): Status: RESOLVED | Noted: 2018-05-04 | Resolved: 2019-02-28

## 2019-02-28 PROBLEM — M25.511 ACUTE PAIN OF RIGHT SHOULDER: Status: ACTIVE | Noted: 2019-02-28

## 2019-02-28 PROBLEM — R50.9 FEVER: Status: RESOLVED | Noted: 2018-05-04 | Resolved: 2019-02-28

## 2019-02-28 PROBLEM — F43.29 GRIEF REACTION WITH PROLONGED BEREAVEMENT: Status: RESOLVED | Noted: 2019-01-03 | Resolved: 2019-02-28

## 2019-02-28 PROBLEM — F43.81 GRIEF REACTION WITH PROLONGED BEREAVEMENT: Status: RESOLVED | Noted: 2019-01-03 | Resolved: 2019-02-28

## 2019-02-28 PROBLEM — G44.229 CHRONIC TENSION-TYPE HEADACHE, NOT INTRACTABLE: Status: RESOLVED | Noted: 2019-01-03 | Resolved: 2019-02-28

## 2019-02-28 PROBLEM — H65.113: Status: RESOLVED | Noted: 2018-08-22 | Resolved: 2019-02-28

## 2019-02-28 PROCEDURE — 99214 OFFICE O/P EST MOD 30 MIN: CPT | Performed by: NURSE PRACTITIONER

## 2019-02-28 PROCEDURE — 96372 THER/PROPH/DIAG INJ SC/IM: CPT | Performed by: NURSE PRACTITIONER

## 2019-02-28 RX ORDER — CEFTRIAXONE 1 G/1
1 INJECTION, POWDER, FOR SOLUTION INTRAMUSCULAR; INTRAVENOUS ONCE
Status: COMPLETED | OUTPATIENT
Start: 2019-02-28 | End: 2019-02-28

## 2019-02-28 RX ORDER — AZITHROMYCIN 250 MG/1
TABLET, FILM COATED ORAL
Qty: 6 TABLET | Refills: 0 | Status: SHIPPED | OUTPATIENT
Start: 2019-02-28 | End: 2019-03-27

## 2019-02-28 RX ADMIN — CEFTRIAXONE 1 G: 1 INJECTION, POWDER, FOR SOLUTION INTRAMUSCULAR; INTRAVENOUS at 19:11

## 2019-02-28 NOTE — PROGRESS NOTES
Yoni Veras is a 33 y.o. female.     Chief Complaint   Patient presents with   • go over labs       History of Present Illness:    Follow-up on recent x-rays and lab results.  Patient remains in physical therapy for low back pain and right shoulder pain.  She does have x-rays to review which are normal.  Original injury was a couple weeks ago when she fell over some Legos at home.  She does report symptoms are mildly improving.  No longer taking muscle relaxer.  Advil only on an as needed basis.      Acute pharyngitis-present times 2 days.  Thick postnasal drainage.  Low-grade temp and chills.    Asthma- frequent exacerbation.  Has her rescue inhaler which she uses less often than daily.  Currently receives Zyrtec and Singulair.    Breast-feeding-continues to breast-feed.        The following portions of the patient's history were reviewed and updated as appropriate:  Allergies, current medications, past family history, past medical history, past social history, past surgical history and problem list.    Review of Systems   Constitutional: Positive for chills and fever. Negative for appetite change, fatigue and unexpected weight change.   HENT: Negative for congestion, ear pain, nosebleeds, postnasal drip, rhinorrhea, sinus pressure, sinus pain, sore throat, trouble swallowing and voice change.    Eyes: Negative for pain and visual disturbance.   Respiratory: Negative for cough, shortness of breath and wheezing.    Cardiovascular: Negative for chest pain and palpitations.   Gastrointestinal: Negative for abdominal pain, blood in stool, constipation and diarrhea.   Endocrine: Negative for cold intolerance and polydipsia.   Genitourinary: Negative for difficulty urinating, flank pain and hematuria.   Musculoskeletal: Positive for arthralgias and back pain. Negative for gait problem, joint swelling and myalgias.   Skin: Negative for color change and rash.   Allergic/Immunologic: Negative.   "  Neurological: Negative for syncope, numbness and headaches.   Hematological: Negative.    Psychiatric/Behavioral: Negative for dysphoric mood, self-injury, sleep disturbance and suicidal ideas. The patient is not nervous/anxious.    All other systems reviewed and are negative.      Objective     /80   Pulse 96   Temp 99.3 °F (37.4 °C) (Tympanic)   Ht 162.6 cm (64\")   Wt 71.7 kg (158 lb)   SpO2 98%   BMI 27.12 kg/m²   Lab on 02/08/2019   Component Date Value Ref Range Status   • TIBC 02/08/2019 293  241 - 421 mcg/dL Final   • UIBC 02/08/2019 197  mcg/dL Final   • Iron 02/08/2019 96  49 - 151 mcg/dL Final   • Iron Saturation 02/08/2019 33  15 - 50 % Final   • Vitamin B-12 02/08/2019 775  211 - 911 pg/mL Final   • Total Cholesterol 02/08/2019 206* 0 - 200 mg/dL Final   • Triglycerides 02/08/2019 146  0 - 150 mg/dL Final   • HDL Cholesterol 02/08/2019 44* 60 - 100 mg/dL Final   • VLDL Cholesterol 02/08/2019 29.2  mg/dL Final   • LDL Cholesterol  02/08/2019 133* 0 - 100 mg/dL Final   • 25 Hydroxy, Vitamin D 02/08/2019 34.0  ng/ml Final   • Hemoglobin A1C 02/08/2019 5.00  4.50 - 5.70 % Final   • TSH 02/08/2019 1.683  0.550 - 4.780 mIU/mL Final   • Glucose 02/08/2019 89  70 - 110 mg/dL Final   • BUN 02/08/2019 20  7 - 21 mg/dL Final   • Creatinine 02/08/2019 0.53  0.43 - 1.29 mg/dL Final   • eGFR Non  Am 02/08/2019 133  >60 mL/min/1.73 Final   • eGFR African Am 02/08/2019 >150  >60 mL/min/1.73 Final   • BUN/Creatinine Ratio 02/08/2019 37.7* 7.0 - 25.0 Final   • Sodium 02/08/2019 140  135 - 153 mmol/L Final   • Potassium 02/08/2019 3.9  3.5 - 5.3 mmol/L Final   • Chloride 02/08/2019 106  99 - 112 mmol/L Final   • Total CO2 02/08/2019 26.6  24.3 - 31.9 mmol/L Final   • Calcium 02/08/2019 9.4  7.7 - 10.0 mg/dL Final   • Total Protein 02/08/2019 7.3  6.0 - 8.0 g/dL Final   • Albumin 02/08/2019 4.80  3.50 - 5.00 g/dL Final   • Globulin 02/08/2019 2.5  gm/dL Final   • A/G Ratio 02/08/2019 1.9  1.5 - 2.5 g/dL " Final   • Total Bilirubin 02/08/2019 0.6  0.2 - 1.8 mg/dL Final   • Alkaline Phosphatase 02/08/2019 83  35 - 104 U/L Final   • AST (SGOT) 02/08/2019 24  10 - 30 U/L Final   • ALT (SGPT) 02/08/2019 22  10 - 36 U/L Final   • WBC 02/08/2019 4.42* 4.50 - 12.50 10*3/mm3 Final   • RBC 02/08/2019 4.24  4.20 - 5.40 10*6/mm3 Final   • Hemoglobin 02/08/2019 12.9  12.0 - 16.0 g/dL Final   • Hematocrit 02/08/2019 37.8  37.0 - 47.0 % Final   • MCV 02/08/2019 89.2  80.0 - 94.0 fL Final   • MCH 02/08/2019 30.4  27.0 - 33.0 pg Final   • MCHC 02/08/2019 34.1  33.0 - 37.0 g/dL Final   • RDW 02/08/2019 12.3  11.5 - 14.5 % Final   • Platelets 02/08/2019 211  130 - 400 10*3/mm3 Final   • Neutrophil Rel % 02/08/2019 57.7  30.0 - 70.0 % Final   • Lymphocyte Rel % 02/08/2019 33.3  21.0 - 51.0 % Final   • Monocyte Rel % 02/08/2019 5.4  0.0 - 10.0 % Final   • Eosinophil Rel % 02/08/2019 2.7  0.0 - 5.0 % Final   • Basophil Rel % 02/08/2019 0.7  0.0 - 2.0 % Final   • Neutrophils Absolute 02/08/2019 2.55  1.40 - 6.50 10*3/mm3 Final   • Lymphocytes Absolute 02/08/2019 1.47  1.00 - 3.00 10*3/mm3 Final   • Monocytes Absolute 02/08/2019 0.24  0.10 - 0.90 10*3/mm3 Final   • Eosinophils Absolute 02/08/2019 0.12  0.00 - 0.70 10*3/mm3 Final   • Basophils Absolute 02/08/2019 0.03  0.00 - 0.30 10*3/mm3 Final   • Immature Granulocyte Rel % 02/08/2019 0.2  0.0 - 0.5 % Final   • Immature Grans Absolute 02/08/2019 0.01  0.00 - 0.03 10*3/mm3 Final       Physical Exam   Constitutional: She is oriented to person, place, and time. Vital signs are normal. She appears well-developed and well-nourished. No distress.   33-year-old female presents today holding her infant daughter.   HENT:   Head: Normocephalic.   Right Ear: External ear normal.   Left Ear: External ear normal.   Nose: Nose normal.   Mouth/Throat: Mucous membranes are normal. Oropharyngeal exudate and posterior oropharyngeal erythema present. No posterior oropharyngeal edema.   Eyes: Conjunctivae,  EOM and lids are normal. Pupils are equal, round, and reactive to light. Right eye exhibits no discharge. Left eye exhibits no discharge.   Neck: Normal range of motion. Neck supple. No tracheal deviation present. No thyromegaly present.   Cardiovascular: Normal rate, regular rhythm and normal heart sounds. Exam reveals no gallop and no friction rub.   No murmur heard.  Pulmonary/Chest: Effort normal and breath sounds normal. No respiratory distress. She has no wheezes. She has no rales. She exhibits no tenderness.   Abdominal: Soft. Normal appearance and bowel sounds are normal. She exhibits no distension and no mass. There is no tenderness. There is no rebound and no guarding.   Musculoskeletal: Normal range of motion.        Right shoulder: She exhibits tenderness. She exhibits normal range of motion, no swelling, no effusion and no crepitus.        Lumbar back: She exhibits tenderness. She exhibits normal range of motion.   Lymphadenopathy:     She has no cervical adenopathy.   Neurological: She is alert and oriented to person, place, and time. She has normal reflexes.   CN 2-12 grossly intact    Skin: Skin is warm and dry. Capillary refill takes less than 2 seconds. No rash noted. She is not diaphoretic. No erythema.   Psychiatric: She has a normal mood and affect. Her speech is normal and behavior is normal. Judgment and thought content normal. Cognition and memory are normal.   Vitals reviewed.      Assessment/Plan     Problem List Items Addressed This Visit        Cardiovascular and Mediastinum    Mixed hyperlipidemia    Current Assessment & Plan     Lipid abnormalities are newly identified.  Nutritional counseling was provided. and Lipid-lowering therapy was not prescribed due to need for life style changes as initial step.  Lipids will be reassessed in 3 months.            Respiratory    Moderate persistent asthma without complication    Current Assessment & Plan     Asthma is improving with  treatment.  The patient is experiencing weekly daytime asthma symptoms. She is experiencing monthly nighttime asthma symptoms.  Patient to keep asthma diary.  Discussed monitoring symptoms and use of quick-relief medications and contacting us early in the course of exacerbations.  Warning signs of respiratory distress were reviewed with the patient.                 Digestive    Vitamin D deficiency    Overview     Not been taking supplement  Will continue supplement             Nervous and Auditory    Acute bilateral low back pain with left-sided sciatica    Current Assessment & Plan     Recommend remain in physical therapy.  Body mechanics.  Conservative measures            Musculoskeletal and Integument    Muscle spasm of back    Current Assessment & Plan     Remain in physical therapy for 6 weeks.  Follow-up in 6 weeks and we will consider MRI if symptoms not improving.            Other    Fall at home, subsequent encounter    Current Assessment & Plan     Body mechanics.  Conservative measures.         Overweight (BMI 25.0-29.9)    Current Assessment & Plan     Obesity is newly identified.  Discussed the patient's BMI.  The BMI is above average; BMI management plan is completed.  General weight loss/lifestyle modification strategies discussed (elicit support from others; identify saboteurs; non-food rewards, etc).  Informal exercise measures discussed, e.g. taking stairs instead of elevator.           Other Visit Diagnoses     Acute pharyngitis, unspecified etiology    -  Primary    Relevant Medications    cefTRIAXone (ROCEPHIN) injection 1 g (Completed)    azithromycin (ZITHROMAX Z-NATY) 250 MG tablet          Labs reviewed.  See above lab results for specific labs reviewed and discussed.  Reduce fat in diet. Work on adding exercise. Remain in Physical therapy.   X-ray results reviewed and discussed.      Fluids, rest, symptomatic treatment advised. Steam therapy. Dispose of tooth bush after 24 hours of starting  antibiotics if ordered. Complete antibiotics as ordered.  Discussed possible side effects/interactions of medication. Discussed symptoms to report as well as reasons to seek urgent or emergent medical attention. Understanding stated.   Recommend follow up in 2-3 days if not improved, sooner if needed.        Patient's Body mass index is 27.12 kg/m². BMI is above normal parameters. Recommendations include: exercise counseling and nutrition counseling.        I have discussed diagnosis in detail today allowing time for questions and answers. Patient is aware of reasons to seek urgent or emergent medical care as well as reasons to return to the clinic for evaluation. Possible side effects, interactions and progression of symptoms discussed as well. Patient / family states understanding.   Emotional support and active listening provided.     Follow up in six weeks, sooner if needed.     Dictated utilizing Dragon dictation        This document has been electronically signed by:  NABEEL Hill, NP-C

## 2019-03-11 ENCOUNTER — OFFICE VISIT (OUTPATIENT)
Dept: PSYCHIATRY | Facility: CLINIC | Age: 34
End: 2019-03-11

## 2019-03-11 DIAGNOSIS — F41.9 ANXIETY DISORDER, UNSPECIFIED TYPE: ICD-10-CM

## 2019-03-11 DIAGNOSIS — F43.81 GRIEF REACTION WITH PROLONGED BEREAVEMENT: Primary | ICD-10-CM

## 2019-03-11 PROCEDURE — 90837 PSYTX W PT 60 MINUTES: CPT | Performed by: SOCIAL WORKER

## 2019-03-11 NOTE — PROGRESS NOTES
"Date of Service: March 11, 2019  Time In: 4:00 PM  Time Out: 5:02 PM      PROGRESS NOTE  Data:  Zhanna Veras is a 33 y.o. female who met 1:1 with the undersigned for a regularly scheduled individual outpatient therapy session at [facility] for follow-up of grief and anxiety.      HPI: P patient reports she feels she is doing \"okay\" states she continues to spend a significant amount of time grieving for her father and regretting not being able to spend more time with him.  However, she reports she is unable to carry out daily activities and continues to go to school full-time and care for her family.  Patient r continues to struggle with symptoms of anxiety including feeling on edge, feeling overwhelmed, trembling, and a sense of impending doom.  Patient rates current symptoms of anxiety at 3 scale of 1-10 with 10 being most severe.  Patient reports she continues to comply with all recommendations a primary care provider's office.  Patient also adamantly convincingly denies suicidal ideation vehemently denies any substance use.      Clinical Maneuvering/Intervention:  Assisted patient in processing above session content; acknowledged and normalized patient’s thoughts, feelings, and concerns.  Assisted patient and discussing/processing her ongoing feelings of grief and validated her feelings.  Also continue to discuss stages of grief and encouraged the patient to consider writing her father a letter.  Also states that the patient in identifying appropriate sources of support and encourage the patient to continue to utilize as needed.  Continue to utilize cognitive behavioral therapy to assist patient with developing appropriate coping mechanisms to decrease the severity and frequency of symptoms of anxiety including positive self talk, relaxation techniques, and challenging quality, irrational thoughts with factual Or arguments.  Provided unconditional positive regard in a safe, " supportive environment.    Allowed patient to freely discuss issues without interruption or judgment. Provided safe, confidential environment to facilitate the development of positive therapeutic relationship and encourage open, honest communication. Assisted patient in identifying risk factors which would indicate the need for higher level of care including thoughts to harm self or others and/or self-harming behavior and encouraged patient to contact this office, call 911, or present to the nearest emergency room should any of these events occur. Discussed crisis intervention services and means to access.  Patient adamantly and convincingly denies current suicidal or homicidal ideation or perceptual disturbance.    Assessment     Diagnoses and all orders for this visit:    Grief reaction with prolonged bereavement    Anxiety disorder, unspecified type               Mental Status Exam  Hygiene:  good  Dress:  casual  Attitude:  Cooperative  Motor Activity:  Appropriate  Speech:  Normal  Mood:  within normal limits  Affect:  calm and pleasant  Thought Processes:  Linear  Thought Content:  normal  Suicidal Thoughts:  denies  Homicidal Thoughts:  denies  Crisis Safety Plan: yes, to come to the emergency room.  Hallucinations:  denies    Patient's Support Network Includes:  , children and extended family    Progress toward goal: Not at goal    Functional Status: Mild impairment     Prognosis: Good with Ongoing Treatment       Plan         Patient will not schedule a follow-up appointment at this time but will contact this office and schedule follow-up if symptoms increase.  The patient will continue in pharmacotherapy with NABEEL Hill as scheduled.  Patient will adhere to medication regimen as prescribed and report any side effects. Patient will contact this office, call 911 or present to the nearest emergency room should suicidal or homicidal ideations occur. Provide Cognitive Behavioral Therapy and  Integrative Therapy to improve functioning, maintain stability, and avoid decompensation and the need for higher level of care.          No Follow-up on file.      This document signed by Cordell Tyler LCSW, KUSUM March 11, 2019 5:08 PM

## 2019-03-27 ENCOUNTER — OFFICE VISIT (OUTPATIENT)
Dept: FAMILY MEDICINE CLINIC | Facility: CLINIC | Age: 34
End: 2019-03-27

## 2019-03-27 VITALS
WEIGHT: 151 LBS | BODY MASS INDEX: 25.78 KG/M2 | HEIGHT: 64 IN | HEART RATE: 98 BPM | TEMPERATURE: 97.8 F | DIASTOLIC BLOOD PRESSURE: 70 MMHG | OXYGEN SATURATION: 99 % | SYSTOLIC BLOOD PRESSURE: 110 MMHG

## 2019-03-27 DIAGNOSIS — J02.9 PHARYNGITIS, UNSPECIFIED ETIOLOGY: Primary | ICD-10-CM

## 2019-03-27 PROCEDURE — 99213 OFFICE O/P EST LOW 20 MIN: CPT | Performed by: NURSE PRACTITIONER

## 2019-03-27 RX ORDER — CEPHALEXIN 500 MG/1
500 CAPSULE ORAL 3 TIMES DAILY
Qty: 30 CAPSULE | Refills: 0 | Status: SHIPPED | OUTPATIENT
Start: 2019-03-27 | End: 2019-04-06

## 2019-03-27 RX ORDER — FLUTICASONE PROPIONATE 50 MCG
SPRAY, SUSPENSION (ML) NASAL
Qty: 1 BOTTLE | Refills: 5 | Status: SHIPPED | OUTPATIENT
Start: 2019-03-27 | End: 2019-08-15 | Stop reason: SDUPTHER

## 2019-03-27 NOTE — PROGRESS NOTES
"Yoni Veras is a 33 y.o. female.     Chief Complaint   Patient presents with   • URI       History of Present Illness:    Cold/allergy symptoms - patient reports cold-like symptoms for the past few days.   History of recurrent pharyngitis over the past few years.  She was scheduled to see an ENT prior to becoming pregnant with her last child.  She has not followed up on that due to not having insurance for a while.    See the review of systems for further history of present illness.      The following portions of the patient's history were reviewed and updated as appropriate:  Allergies, current medications, past family history, past medical history, past social history, past surgical history and problem list.    Review of Systems   Constitutional: Negative for appetite change, fatigue and unexpected weight change.   HENT: Positive for ear pain and sore throat. Negative for congestion, nosebleeds, postnasal drip, rhinorrhea, trouble swallowing and voice change.    Eyes: Negative for pain and visual disturbance.   Respiratory: Negative for cough, shortness of breath and wheezing.    Cardiovascular: Negative for chest pain and palpitations.   Gastrointestinal: Negative for abdominal pain, blood in stool, constipation and diarrhea.   Endocrine: Negative for cold intolerance and polydipsia.   Genitourinary: Negative for difficulty urinating, flank pain and hematuria.   Musculoskeletal: Negative for arthralgias, back pain, gait problem, joint swelling and myalgias.   Skin: Negative for color change and rash.   Allergic/Immunologic: Positive for environmental allergies.   Neurological: Negative for syncope, numbness and headaches.   Hematological: Negative.    Psychiatric/Behavioral: Negative for sleep disturbance and suicidal ideas.   All other systems reviewed and are negative.      Objective     /70   Pulse 98   Temp 97.8 °F (36.6 °C) (Tympanic)   Ht 162.6 cm (64\")   Wt 68.5 kg (151 lb)   " SpO2 99%   Breastfeeding? No   BMI 25.92 kg/m²   Lab on 02/08/2019   Component Date Value Ref Range Status   • TIBC 02/08/2019 293  241 - 421 mcg/dL Final   • UIBC 02/08/2019 197  mcg/dL Final   • Iron 02/08/2019 96  49 - 151 mcg/dL Final   • Iron Saturation 02/08/2019 33  15 - 50 % Final   • Vitamin B-12 02/08/2019 775  211 - 911 pg/mL Final   • Total Cholesterol 02/08/2019 206* 0 - 200 mg/dL Final   • Triglycerides 02/08/2019 146  0 - 150 mg/dL Final   • HDL Cholesterol 02/08/2019 44* 60 - 100 mg/dL Final   • VLDL Cholesterol 02/08/2019 29.2  mg/dL Final   • LDL Cholesterol  02/08/2019 133* 0 - 100 mg/dL Final   • 25 Hydroxy, Vitamin D 02/08/2019 34.0  ng/ml Final   • Hemoglobin A1C 02/08/2019 5.00  4.50 - 5.70 % Final   • TSH 02/08/2019 1.683  0.550 - 4.780 mIU/mL Final   • Glucose 02/08/2019 89  70 - 110 mg/dL Final   • BUN 02/08/2019 20  7 - 21 mg/dL Final   • Creatinine 02/08/2019 0.53  0.43 - 1.29 mg/dL Final   • eGFR Non  Am 02/08/2019 133  >60 mL/min/1.73 Final   • eGFR African Am 02/08/2019 >150  >60 mL/min/1.73 Final   • BUN/Creatinine Ratio 02/08/2019 37.7* 7.0 - 25.0 Final   • Sodium 02/08/2019 140  135 - 153 mmol/L Final   • Potassium 02/08/2019 3.9  3.5 - 5.3 mmol/L Final   • Chloride 02/08/2019 106  99 - 112 mmol/L Final   • Total CO2 02/08/2019 26.6  24.3 - 31.9 mmol/L Final   • Calcium 02/08/2019 9.4  7.7 - 10.0 mg/dL Final   • Total Protein 02/08/2019 7.3  6.0 - 8.0 g/dL Final   • Albumin 02/08/2019 4.80  3.50 - 5.00 g/dL Final   • Globulin 02/08/2019 2.5  gm/dL Final   • A/G Ratio 02/08/2019 1.9  1.5 - 2.5 g/dL Final   • Total Bilirubin 02/08/2019 0.6  0.2 - 1.8 mg/dL Final   • Alkaline Phosphatase 02/08/2019 83  35 - 104 U/L Final   • AST (SGOT) 02/08/2019 24  10 - 30 U/L Final   • ALT (SGPT) 02/08/2019 22  10 - 36 U/L Final   • WBC 02/08/2019 4.42* 4.50 - 12.50 10*3/mm3 Final   • RBC 02/08/2019 4.24  4.20 - 5.40 10*6/mm3 Final   • Hemoglobin 02/08/2019 12.9  12.0 - 16.0 g/dL Final   •  Hematocrit 02/08/2019 37.8  37.0 - 47.0 % Final   • MCV 02/08/2019 89.2  80.0 - 94.0 fL Final   • MCH 02/08/2019 30.4  27.0 - 33.0 pg Final   • MCHC 02/08/2019 34.1  33.0 - 37.0 g/dL Final   • RDW 02/08/2019 12.3  11.5 - 14.5 % Final   • Platelets 02/08/2019 211  130 - 400 10*3/mm3 Final   • Neutrophil Rel % 02/08/2019 57.7  30.0 - 70.0 % Final   • Lymphocyte Rel % 02/08/2019 33.3  21.0 - 51.0 % Final   • Monocyte Rel % 02/08/2019 5.4  0.0 - 10.0 % Final   • Eosinophil Rel % 02/08/2019 2.7  0.0 - 5.0 % Final   • Basophil Rel % 02/08/2019 0.7  0.0 - 2.0 % Final   • Neutrophils Absolute 02/08/2019 2.55  1.40 - 6.50 10*3/mm3 Final   • Lymphocytes Absolute 02/08/2019 1.47  1.00 - 3.00 10*3/mm3 Final   • Monocytes Absolute 02/08/2019 0.24  0.10 - 0.90 10*3/mm3 Final   • Eosinophils Absolute 02/08/2019 0.12  0.00 - 0.70 10*3/mm3 Final   • Basophils Absolute 02/08/2019 0.03  0.00 - 0.30 10*3/mm3 Final   • Immature Granulocyte Rel % 02/08/2019 0.2  0.0 - 0.5 % Final   • Immature Grans Absolute 02/08/2019 0.01  0.00 - 0.03 10*3/mm3 Final       Physical Exam   Constitutional: She is oriented to person, place, and time. She appears well-developed and well-nourished. She has a sickly appearance. No distress.   Appears acutely ill.    HENT:   Head: Normocephalic.   Right Ear: Hearing normal. No lacerations. No drainage or swelling. No foreign bodies. No mastoid tenderness. A middle ear effusion is present.   Left Ear: Hearing normal. No lacerations. No drainage or swelling. No foreign bodies. No mastoid tenderness. A middle ear effusion is present.   Nose: Mucosal edema and rhinorrhea present. No nose lacerations, sinus tenderness or nasal deformity. No epistaxis.  No foreign bodies.   Mouth/Throat: Uvula is midline. Oropharyngeal exudate and posterior oropharyngeal erythema present. No tonsillar abscesses.   TM's are cloudy bilateral   Eyes: EOM are normal. Pupils are equal, round, and reactive to light. Right eye exhibits no  discharge. Left eye exhibits no discharge.   Neck: Normal range of motion. Neck supple. No thyromegaly present.   Cardiovascular: Normal rate, regular rhythm, normal heart sounds and intact distal pulses.   Pulmonary/Chest: Effort normal and breath sounds normal. No respiratory distress.   Cough noted    Abdominal: Soft. Bowel sounds are normal. She exhibits no distension. There is no tenderness. There is no guarding.   Lymphadenopathy:     She has cervical adenopathy.        Right cervical: Superficial cervical adenopathy present. No deep cervical adenopathy present.       Left cervical: Superficial cervical adenopathy present. No deep cervical adenopathy present.   Neurological: She is alert and oriented to person, place, and time. She has normal reflexes.   Skin: Skin is warm and dry. Capillary refill takes less than 2 seconds. No rash noted. She is not diaphoretic.   Psychiatric: She has a normal mood and affect. Her behavior is normal. Judgment and thought content normal.   Vitals reviewed.      Assessment/Plan     Problem List Items Addressed This Visit     None      Visit Diagnoses     Pharyngitis, unspecified etiology    -  Primary    Relevant Medications    cephalexin (KEFLEX) 500 MG capsule    fluticasone (FLONASE) 50 MCG/ACT nasal spray    Other Relevant Orders    Ambulatory Referral to ENT (Otolaryngology)              Fluids, rest, symptomatic treatment advised. Steam therapy. Dispose of tooth bush after 24 hours of starting antibiotics if ordered. Complete antibiotics as ordered.  Discussed possible side effects/interactions of medication. Discussed symptoms to report as well as reasons to seek urgent or emergent medical attention. Understanding stated.   Recommend follow up in 2-3 days if not improved, sooner if needed.        Patient's Body mass index is 25.92 kg/m². BMI is above normal parameters. Recommendations include: exercise counseling and nutrition counseling.        I have discussed  diagnosis in detail today allowing time for questions and answers. Patient is aware of reasons to seek urgent or emergent medical care as well as reasons to return to the clinic for evaluation. Possible side effects, interactions and progression of symptoms discussed as well. Patient / family states understanding.   Emotional support and active listening provided.     Dictated utilizing Dragon dictation          This document has been electronically signed by:  NABEEL Hill, NP-C

## 2019-04-11 ENCOUNTER — OFFICE VISIT (OUTPATIENT)
Dept: FAMILY MEDICINE CLINIC | Facility: CLINIC | Age: 34
End: 2019-04-11

## 2019-04-11 VITALS
BODY MASS INDEX: 26.12 KG/M2 | OXYGEN SATURATION: 98 % | TEMPERATURE: 98.9 F | HEIGHT: 64 IN | SYSTOLIC BLOOD PRESSURE: 110 MMHG | HEART RATE: 92 BPM | WEIGHT: 153 LBS | DIASTOLIC BLOOD PRESSURE: 70 MMHG

## 2019-04-11 DIAGNOSIS — J45.40 MODERATE PERSISTENT ASTHMA WITHOUT COMPLICATION: ICD-10-CM

## 2019-04-11 DIAGNOSIS — M62.830 MUSCLE SPASM OF BACK: Primary | ICD-10-CM

## 2019-04-11 DIAGNOSIS — M54.42 ACUTE BILATERAL LOW BACK PAIN WITH LEFT-SIDED SCIATICA: ICD-10-CM

## 2019-04-11 PROCEDURE — 99214 OFFICE O/P EST MOD 30 MIN: CPT | Performed by: NURSE PRACTITIONER

## 2019-04-11 RX ORDER — IBUPROFEN 600 MG/1
600 TABLET ORAL EVERY 6 HOURS PRN
Qty: 40 TABLET | Refills: 1 | Status: SHIPPED | OUTPATIENT
Start: 2019-04-11 | End: 2019-08-15 | Stop reason: SDUPTHER

## 2019-04-11 NOTE — ASSESSMENT & PLAN NOTE
Asthma is improving with treatment.  The patient is experiencing monthly daytime asthma symptoms. She is experiencing monthly nighttime asthma symptoms.  Patient to keep asthma diary.  Discussed monitoring symptoms and use of quick-relief medications and contacting us early in the course of exacerbations.  Counseled to void exposure to cigarette smoke.  Discussed medication dosage, use, side effects, and goals of treatment in detail.

## 2019-04-11 NOTE — ASSESSMENT & PLAN NOTE
Pain now radiates at times into the right lower extremity as well.  Order for MRI today.  Body mechanics reviewed.  Refill Advil.  Follow-up 4-6 weeks.

## 2019-04-11 NOTE — PROGRESS NOTES
Subjective   Zhanna Veras is a 33 y.o. female.     Chief Complaint   Patient presents with   • Back Pain   • Earache     improving        History of Present Illness:    Low back pain-patient has been under the care of chiropractor.  She has tried conservative treatment, body mechanics, anti-inflammatories and muscle relaxer.  Pain continues to be her low back and radiates from hip to hip.  Pain is rated 6 on pain scale rating of 0-10.  At times the pain radiates down into both lower extremities.  Chiropractor is recommending that she have an MRI and is suspecting a pinched nerve.    Ear pain-improving from recent visit.  Currently receives Singulair and Zyrtec.  She does have Flonase but has not been taking.  Right ear has pressure feels full.  No fever, chills or signs of infection.      Asthma-symptoms stable at this time.  No current exacerbation.      The following portions of the patient's history were reviewed and updated as appropriate:  Allergies, current medications, past family history, past medical history, past social history, past surgical history and problem list.    Review of Systems   Constitutional: Negative for appetite change, fatigue and unexpected weight change.   HENT: Positive for ear pain. Negative for congestion, nosebleeds, postnasal drip, rhinorrhea, sore throat, trouble swallowing and voice change.    Eyes: Negative for pain and visual disturbance.   Respiratory: Negative for cough, shortness of breath and wheezing.    Cardiovascular: Negative for chest pain and palpitations.   Gastrointestinal: Negative for abdominal pain, blood in stool, constipation and diarrhea.   Endocrine: Negative for cold intolerance and polydipsia.   Genitourinary: Negative for difficulty urinating, flank pain and hematuria.   Musculoskeletal: Positive for back pain. Negative for arthralgias, gait problem, joint swelling and myalgias.   Skin: Negative for color change and rash.   Allergic/Immunologic:  "Positive for environmental allergies.   Neurological: Negative for syncope, numbness and headaches.   Hematological: Negative.    Psychiatric/Behavioral: Negative for dysphoric mood, sleep disturbance and suicidal ideas.   All other systems reviewed and are negative.      Objective     /70   Pulse 92   Temp 98.9 °F (37.2 °C) (Tympanic)   Ht 162.6 cm (64\")   Wt 69.4 kg (153 lb)   SpO2 98%   BMI 26.26 kg/m²   Lab on 02/08/2019   Component Date Value Ref Range Status   • TIBC 02/08/2019 293  241 - 421 mcg/dL Final   • UIBC 02/08/2019 197  mcg/dL Final   • Iron 02/08/2019 96  49 - 151 mcg/dL Final   • Iron Saturation 02/08/2019 33  15 - 50 % Final   • Vitamin B-12 02/08/2019 775  211 - 911 pg/mL Final   • Total Cholesterol 02/08/2019 206* 0 - 200 mg/dL Final   • Triglycerides 02/08/2019 146  0 - 150 mg/dL Final   • HDL Cholesterol 02/08/2019 44* 60 - 100 mg/dL Final   • VLDL Cholesterol 02/08/2019 29.2  mg/dL Final   • LDL Cholesterol  02/08/2019 133* 0 - 100 mg/dL Final   • 25 Hydroxy, Vitamin D 02/08/2019 34.0  ng/ml Final   • Hemoglobin A1C 02/08/2019 5.00  4.50 - 5.70 % Final   • TSH 02/08/2019 1.683  0.550 - 4.780 mIU/mL Final   • Glucose 02/08/2019 89  70 - 110 mg/dL Final   • BUN 02/08/2019 20  7 - 21 mg/dL Final   • Creatinine 02/08/2019 0.53  0.43 - 1.29 mg/dL Final   • eGFR Non  Am 02/08/2019 133  >60 mL/min/1.73 Final   • eGFR African Am 02/08/2019 >150  >60 mL/min/1.73 Final   • BUN/Creatinine Ratio 02/08/2019 37.7* 7.0 - 25.0 Final   • Sodium 02/08/2019 140  135 - 153 mmol/L Final   • Potassium 02/08/2019 3.9  3.5 - 5.3 mmol/L Final   • Chloride 02/08/2019 106  99 - 112 mmol/L Final   • Total CO2 02/08/2019 26.6  24.3 - 31.9 mmol/L Final   • Calcium 02/08/2019 9.4  7.7 - 10.0 mg/dL Final   • Total Protein 02/08/2019 7.3  6.0 - 8.0 g/dL Final   • Albumin 02/08/2019 4.80  3.50 - 5.00 g/dL Final   • Globulin 02/08/2019 2.5  gm/dL Final   • A/G Ratio 02/08/2019 1.9  1.5 - 2.5 g/dL Final   • " Total Bilirubin 02/08/2019 0.6  0.2 - 1.8 mg/dL Final   • Alkaline Phosphatase 02/08/2019 83  35 - 104 U/L Final   • AST (SGOT) 02/08/2019 24  10 - 30 U/L Final   • ALT (SGPT) 02/08/2019 22  10 - 36 U/L Final   • WBC 02/08/2019 4.42* 4.50 - 12.50 10*3/mm3 Final   • RBC 02/08/2019 4.24  4.20 - 5.40 10*6/mm3 Final   • Hemoglobin 02/08/2019 12.9  12.0 - 16.0 g/dL Final   • Hematocrit 02/08/2019 37.8  37.0 - 47.0 % Final   • MCV 02/08/2019 89.2  80.0 - 94.0 fL Final   • MCH 02/08/2019 30.4  27.0 - 33.0 pg Final   • MCHC 02/08/2019 34.1  33.0 - 37.0 g/dL Final   • RDW 02/08/2019 12.3  11.5 - 14.5 % Final   • Platelets 02/08/2019 211  130 - 400 10*3/mm3 Final   • Neutrophil Rel % 02/08/2019 57.7  30.0 - 70.0 % Final   • Lymphocyte Rel % 02/08/2019 33.3  21.0 - 51.0 % Final   • Monocyte Rel % 02/08/2019 5.4  0.0 - 10.0 % Final   • Eosinophil Rel % 02/08/2019 2.7  0.0 - 5.0 % Final   • Basophil Rel % 02/08/2019 0.7  0.0 - 2.0 % Final   • Neutrophils Absolute 02/08/2019 2.55  1.40 - 6.50 10*3/mm3 Final   • Lymphocytes Absolute 02/08/2019 1.47  1.00 - 3.00 10*3/mm3 Final   • Monocytes Absolute 02/08/2019 0.24  0.10 - 0.90 10*3/mm3 Final   • Eosinophils Absolute 02/08/2019 0.12  0.00 - 0.70 10*3/mm3 Final   • Basophils Absolute 02/08/2019 0.03  0.00 - 0.30 10*3/mm3 Final   • Immature Granulocyte Rel % 02/08/2019 0.2  0.0 - 0.5 % Final   • Immature Grans Absolute 02/08/2019 0.01  0.00 - 0.03 10*3/mm3 Final       Physical Exam   Constitutional: She is oriented to person, place, and time. Vital signs are normal. She appears well-developed and well-nourished. No distress.   HENT:   Head: Normocephalic.   Right Ear: Hearing, external ear and ear canal normal. Tympanic membrane is bulging.   Left Ear: Hearing, tympanic membrane, external ear and ear canal normal.   Nose: Nose normal.   Mouth/Throat: Oropharynx is clear and moist. No oropharyngeal exudate.   Eyes: Conjunctivae, EOM and lids are normal. Pupils are equal, round, and  reactive to light. Right eye exhibits no discharge. Left eye exhibits no discharge.   Neck: Normal range of motion. Neck supple. No tracheal deviation present. No thyromegaly present.   Cardiovascular: Normal rate, regular rhythm and normal heart sounds. Exam reveals no gallop and no friction rub.   No murmur heard.  Pulmonary/Chest: Effort normal and breath sounds normal. No respiratory distress. She has no wheezes. She has no rales. She exhibits no tenderness.   Abdominal: Soft. Normal appearance and bowel sounds are normal. She exhibits no distension and no mass. There is no tenderness. There is no rebound and no guarding.   Musculoskeletal: Normal range of motion.        Lumbar back: She exhibits tenderness and pain. She exhibits normal range of motion.   Lymphadenopathy:     She has no cervical adenopathy.   Neurological: She is alert and oriented to person, place, and time. She has normal reflexes.   CN 2-12 grossly intact    Skin: Skin is warm and dry. Capillary refill takes less than 2 seconds. No rash noted. She is not diaphoretic. No erythema.   Psychiatric: She has a normal mood and affect. Her speech is normal and behavior is normal. Judgment and thought content normal. Cognition and memory are normal.   Vitals reviewed.      Assessment/Plan     Problem List Items Addressed This Visit        Respiratory    Moderate persistent asthma without complication    Current Assessment & Plan     Asthma is improving with treatment.  The patient is experiencing monthly daytime asthma symptoms. She is experiencing monthly nighttime asthma symptoms.  Patient to keep asthma diary.  Discussed monitoring symptoms and use of quick-relief medications and contacting us early in the course of exacerbations.  Counseled to void exposure to cigarette smoke.  Discussed medication dosage, use, side effects, and goals of treatment in detail.                  Nervous and Auditory    Acute bilateral low back pain with left-sided  sciatica    Current Assessment & Plan     Pain now radiates at times into the right lower extremity as well.  Order for MRI today.  Body mechanics reviewed.  Refill Advil.  Follow-up 4-6 weeks.         Relevant Medications    ibuprofen (ADVIL,MOTRIN) 600 MG tablet    Other Relevant Orders    MRI Lumbar Spine Without Contrast       Musculoskeletal and Integument    Muscle spasm of back - Primary    Current Assessment & Plan     Remain under the care of chiropractor.                 Patient has not been taking her Flonase and has been advised to start back doing Flonase daily to help relieve some of her ear pressure.  Avoid known allergy triggers.           Patient's Body mass index is 26.26 kg/m². BMI is above normal parameters. Recommendations include: exercise counseling and nutrition counseling.      I have discussed diagnosis in detail today allowing time for questions and answers. Patient is aware of reasons to seek urgent or emergent medical care as well as reasons to return to the clinic for evaluation. Possible side effects, interactions and progression of symptoms discussed as well. Patient / family states understanding.   Emotional support and active listening provided.     Follow-up in 4-6    4-6 weeks, sooner if needed.      This document has been electronically signed by:  NABEEL Hill, NP-C

## 2019-04-25 ENCOUNTER — TELEPHONE (OUTPATIENT)
Dept: FAMILY MEDICINE CLINIC | Facility: CLINIC | Age: 34
End: 2019-04-25

## 2019-04-25 NOTE — TELEPHONE ENCOUNTER
----- Message from NABEEL Jenkins sent at 4/25/2019  3:10 PM EDT -----  Let patient know that she has several bulging disc in her back.  At this time I recommend she continues physical therapy and/or therapy with chiropractor.  Please notify her therapist/chiropractor of the findings and have her follow-up with me in a couple weeks to further discuss.    Called the pt

## 2019-05-23 ENCOUNTER — OFFICE VISIT (OUTPATIENT)
Dept: FAMILY MEDICINE CLINIC | Facility: CLINIC | Age: 34
End: 2019-05-23

## 2019-05-23 VITALS
BODY MASS INDEX: 26.63 KG/M2 | TEMPERATURE: 98.6 F | DIASTOLIC BLOOD PRESSURE: 60 MMHG | WEIGHT: 156 LBS | SYSTOLIC BLOOD PRESSURE: 90 MMHG | HEART RATE: 77 BPM | OXYGEN SATURATION: 98 % | HEIGHT: 64 IN

## 2019-05-23 DIAGNOSIS — J45.40 MODERATE PERSISTENT ASTHMA WITHOUT COMPLICATION: ICD-10-CM

## 2019-05-23 DIAGNOSIS — M51.36 BULGE OF LUMBAR DISC WITHOUT MYELOPATHY: ICD-10-CM

## 2019-05-23 DIAGNOSIS — J31.2 CHRONIC PHARYNGITIS: ICD-10-CM

## 2019-05-23 DIAGNOSIS — M51.36 DDD (DEGENERATIVE DISC DISEASE), LUMBAR: Primary | ICD-10-CM

## 2019-05-23 PROBLEM — M51.369 DDD (DEGENERATIVE DISC DISEASE), LUMBAR: Status: ACTIVE | Noted: 2019-05-23

## 2019-05-23 PROCEDURE — 99214 OFFICE O/P EST MOD 30 MIN: CPT | Performed by: NURSE PRACTITIONER

## 2019-05-23 NOTE — ASSESSMENT & PLAN NOTE
MRI showing bulging disc of the lumbar spine.  Recommend neurosurgeon consult if conservative treatment is not helpful.  I do recommend patient do 4-6 weeks of physical therapy.  Will place referral to [facility] as they have later appointments.  Patient is to follow-up with me in 4-6 weeks, sooner if needed.

## 2019-05-23 NOTE — ASSESSMENT & PLAN NOTE
Asthma is improving with treatment.  The patient is experiencing monthly daytime asthma symptoms. She is experiencing monthly nighttime asthma symptoms.  Patient to keep asthma diary.  Follow up in 3 month, or sooner should new symptoms or problems arise.

## 2019-05-23 NOTE — PROGRESS NOTES
Subjective   Zhanna Veras is a 33 y.o. female.     Chief Complaint   Patient presents with   • Annual Exam     Patient scheduled for annual exam today.  She does not have time to wait due to her child having 6 grade night schedule at 530 today.    History of Present Illness:    Back pain with worsening sciatica extending into both lower extremities at times.  Pain does seem to be worse on the right.  She does at times have some dermatomal numbness of the right foot including the fifth digit of the outer edge of her right foot.  She is seeing a chiropractor but has not been to physical therapy.  Patient states that she needs a physical therapy appointment that can be scheduled later in the evening after work.    Chronic recurrent pharyngitis- patient has been referred to ENT.  She states she has not heard from the ENT office.  She is scheduled in June for ENT.    Asthma-no recent exacerbation.  Currently taking Zyrtec, Flonase, Singulair and albuterol inhaler as needed.    GERD-improved with Zantac.      The following portions of the patient's history were reviewed and updated as appropriate:  Allergies, current medications, past family history, past medical history, past social history, past surgical history and problem list.    Review of Systems   Constitutional: Negative for activity change, appetite change, chills, fatigue and fever.   HENT: Negative for ear pain, facial swelling, hearing loss, sinus pressure, sore throat, trouble swallowing and voice change.    Eyes: Negative for pain, discharge and visual disturbance.   Respiratory: Negative for apnea, cough, chest tightness, shortness of breath and wheezing.    Cardiovascular: Negative for chest pain, palpitations and leg swelling.   Gastrointestinal: Negative for abdominal pain, blood in stool, constipation, diarrhea, nausea and vomiting.   Genitourinary: Negative for dysuria.   Musculoskeletal: Positive for arthralgias and back pain. Negative for neck  "stiffness.   Skin: Negative for color change.   Neurological: Positive for numbness (Right foot at times). Negative for headaches.   Psychiatric/Behavioral: Negative for confusion and suicidal ideas. The patient is not nervous/anxious.        Objective     BP 90/60   Pulse 77   Temp 98.6 °F (37 °C) (Tympanic)   Ht 162.6 cm (64\")   Wt 70.8 kg (156 lb)   SpO2 98%   BMI 26.78 kg/m²   Lab on 02/08/2019   Component Date Value Ref Range Status   • TIBC 02/08/2019 293  241 - 421 mcg/dL Final   • UIBC 02/08/2019 197  mcg/dL Final   • Iron 02/08/2019 96  49 - 151 mcg/dL Final   • Iron Saturation 02/08/2019 33  15 - 50 % Final   • Vitamin B-12 02/08/2019 775  211 - 911 pg/mL Final   • Total Cholesterol 02/08/2019 206* 0 - 200 mg/dL Final   • Triglycerides 02/08/2019 146  0 - 150 mg/dL Final   • HDL Cholesterol 02/08/2019 44* 60 - 100 mg/dL Final   • VLDL Cholesterol 02/08/2019 29.2  mg/dL Final   • LDL Cholesterol  02/08/2019 133* 0 - 100 mg/dL Final   • 25 Hydroxy, Vitamin D 02/08/2019 34.0  ng/ml Final   • Hemoglobin A1C 02/08/2019 5.00  4.50 - 5.70 % Final   • TSH 02/08/2019 1.683  0.550 - 4.780 mIU/mL Final   • Glucose 02/08/2019 89  70 - 110 mg/dL Final   • BUN 02/08/2019 20  7 - 21 mg/dL Final   • Creatinine 02/08/2019 0.53  0.43 - 1.29 mg/dL Final   • eGFR Non  Am 02/08/2019 133  >60 mL/min/1.73 Final   • eGFR African Am 02/08/2019 >150  >60 mL/min/1.73 Final   • BUN/Creatinine Ratio 02/08/2019 37.7* 7.0 - 25.0 Final   • Sodium 02/08/2019 140  135 - 153 mmol/L Final   • Potassium 02/08/2019 3.9  3.5 - 5.3 mmol/L Final   • Chloride 02/08/2019 106  99 - 112 mmol/L Final   • Total CO2 02/08/2019 26.6  24.3 - 31.9 mmol/L Final   • Calcium 02/08/2019 9.4  7.7 - 10.0 mg/dL Final   • Total Protein 02/08/2019 7.3  6.0 - 8.0 g/dL Final   • Albumin 02/08/2019 4.80  3.50 - 5.00 g/dL Final   • Globulin 02/08/2019 2.5  gm/dL Final   • A/G Ratio 02/08/2019 1.9  1.5 - 2.5 g/dL Final   • Total Bilirubin 02/08/2019 0.6  " 0.2 - 1.8 mg/dL Final   • Alkaline Phosphatase 02/08/2019 83  35 - 104 U/L Final   • AST (SGOT) 02/08/2019 24  10 - 30 U/L Final   • ALT (SGPT) 02/08/2019 22  10 - 36 U/L Final   • WBC 02/08/2019 4.42* 4.50 - 12.50 10*3/mm3 Final   • RBC 02/08/2019 4.24  4.20 - 5.40 10*6/mm3 Final   • Hemoglobin 02/08/2019 12.9  12.0 - 16.0 g/dL Final   • Hematocrit 02/08/2019 37.8  37.0 - 47.0 % Final   • MCV 02/08/2019 89.2  80.0 - 94.0 fL Final   • MCH 02/08/2019 30.4  27.0 - 33.0 pg Final   • MCHC 02/08/2019 34.1  33.0 - 37.0 g/dL Final   • RDW 02/08/2019 12.3  11.5 - 14.5 % Final   • Platelets 02/08/2019 211  130 - 400 10*3/mm3 Final   • Neutrophil Rel % 02/08/2019 57.7  30.0 - 70.0 % Final   • Lymphocyte Rel % 02/08/2019 33.3  21.0 - 51.0 % Final   • Monocyte Rel % 02/08/2019 5.4  0.0 - 10.0 % Final   • Eosinophil Rel % 02/08/2019 2.7  0.0 - 5.0 % Final   • Basophil Rel % 02/08/2019 0.7  0.0 - 2.0 % Final   • Neutrophils Absolute 02/08/2019 2.55  1.40 - 6.50 10*3/mm3 Final   • Lymphocytes Absolute 02/08/2019 1.47  1.00 - 3.00 10*3/mm3 Final   • Monocytes Absolute 02/08/2019 0.24  0.10 - 0.90 10*3/mm3 Final   • Eosinophils Absolute 02/08/2019 0.12  0.00 - 0.70 10*3/mm3 Final   • Basophils Absolute 02/08/2019 0.03  0.00 - 0.30 10*3/mm3 Final   • Immature Granulocyte Rel % 02/08/2019 0.2  0.0 - 0.5 % Final   • Immature Grans Absolute 02/08/2019 0.01  0.00 - 0.03 10*3/mm3 Final       Physical Exam   Constitutional: She is oriented to person, place, and time. Vital signs are normal. She appears well-developed and well-nourished. No distress.   Very pleasant 33-year-old female in hurry to make it to her son's sixth grade night.   HENT:   Head: Normocephalic.   Right Ear: External ear normal.   Left Ear: External ear normal.   Nose: Nose normal.   Mouth/Throat: Oropharynx is clear and moist. No oropharyngeal exudate.   Eyes: Conjunctivae, EOM and lids are normal. Pupils are equal, round, and reactive to light. Right eye exhibits no  discharge. Left eye exhibits no discharge.   Neck: Normal range of motion. Neck supple. No tracheal deviation present. No thyromegaly present.   Cardiovascular: Normal rate, regular rhythm and normal heart sounds. Exam reveals no gallop and no friction rub.   No murmur heard.  Pulmonary/Chest: Effort normal and breath sounds normal. No respiratory distress. She has no wheezes. She has no rales. She exhibits no tenderness.   Abdominal: Soft. Normal appearance and bowel sounds are normal. She exhibits no distension and no mass. There is no tenderness. There is no rebound and no guarding.   Musculoskeletal: Normal range of motion.        Lumbar back: She exhibits tenderness.   Lymphadenopathy:     She has no cervical adenopathy.   Neurological: She is alert and oriented to person, place, and time. She has normal strength and normal reflexes. No cranial nerve deficit or sensory deficit.   CN 2-12 grossly intact    Skin: Skin is warm and dry. Capillary refill takes less than 2 seconds. No rash noted. She is not diaphoretic. No erythema.   Psychiatric: She has a normal mood and affect. Her speech is normal and behavior is normal. Judgment and thought content normal. Cognition and memory are normal.   Vitals reviewed.      Assessment/Plan     Problem List Items Addressed This Visit        Respiratory    Moderate persistent asthma without complication    Current Assessment & Plan     Asthma is improving with treatment.  The patient is experiencing monthly daytime asthma symptoms. She is experiencing monthly nighttime asthma symptoms.  Patient to keep asthma diary.  Follow up in 3 month, or sooner should new symptoms or problems arise.             Chronic pharyngitis    Overview     Current.  Scheduled with ENT.            Musculoskeletal and Integument    DDD (degenerative disc disease), lumbar - Primary    Current Assessment & Plan     MRI showing bulging disc of the lumbar spine.  Recommend neurosurgeon consult if  conservative treatment is not helpful.  I do recommend patient do 4-6 weeks of physical therapy.  [facility] place referral to [facility] as they have later appointments.  Patient is to follow-up with me in 4-6 weeks, sooner if needed.         Relevant Orders    Ambulatory Referral to Physical Therapy Evaluate and treat (Completed)    Ambulatory Referral to Neurosurgery      Other Visit Diagnoses     Bulge of lumbar disc without myelopathy        Relevant Orders    Ambulatory Referral to Neurosurgery          Proper body mechanics has been reviewed and discussed today.  Lifting, twisting or bending.  I will go ahead and place referral to neurosurgeon for consult.  I do recommend she attend 4 to 6 weeks of physical therapy and avoid lifting, twisting or bending.         Patient's Body mass index is 26.78 kg/m². BMI is above normal parameters. Recommendations include: exercise counseling and nutrition counseling.        I have discussed diagnosis in detail today allowing time for questions and answers. Patient is aware of reasons to seek urgent or emergent medical care as well as reasons to return to the clinic for evaluation. Possible side effects, interactions and progression of symptoms discussed as well. Patient / family states understanding.   Emotional support and active listening provided.     Follow-up in 6-8 weeks, sooner if needed.     Dictated utilizing Dragon dictation        This document has been electronically signed by:  NAEBEL Hill, NP-C

## 2019-06-11 ENCOUNTER — TRANSCRIBE ORDERS (OUTPATIENT)
Dept: ADMINISTRATIVE | Facility: HOSPITAL | Age: 34
End: 2019-06-11

## 2019-06-11 ENCOUNTER — LAB (OUTPATIENT)
Dept: LAB | Facility: HOSPITAL | Age: 34
End: 2019-06-11

## 2019-06-11 DIAGNOSIS — J02.9 ACUTE PHARYNGITIS, UNSPECIFIED ETIOLOGY: Primary | ICD-10-CM

## 2019-06-11 DIAGNOSIS — J02.9 ACUTE PHARYNGITIS, UNSPECIFIED ETIOLOGY: ICD-10-CM

## 2019-06-11 PROCEDURE — 87081 CULTURE SCREEN ONLY: CPT

## 2019-06-13 ENCOUNTER — OFFICE VISIT (OUTPATIENT)
Dept: NEUROSURGERY | Facility: CLINIC | Age: 34
End: 2019-06-13

## 2019-06-13 VITALS
WEIGHT: 155.4 LBS | TEMPERATURE: 97.3 F | HEIGHT: 64 IN | BODY MASS INDEX: 26.53 KG/M2 | DIASTOLIC BLOOD PRESSURE: 62 MMHG | SYSTOLIC BLOOD PRESSURE: 89 MMHG

## 2019-06-13 DIAGNOSIS — M51.36 DEGENERATIVE DISC DISEASE, LUMBAR: Primary | ICD-10-CM

## 2019-06-13 DIAGNOSIS — M53.3 SACRO-ILIAC PAIN: ICD-10-CM

## 2019-06-13 PROBLEM — M51.369 DEGENERATIVE DISC DISEASE, LUMBAR: Status: ACTIVE | Noted: 2019-06-13

## 2019-06-13 LAB — BACTERIA SPEC AEROBE CULT: NORMAL

## 2019-06-13 PROCEDURE — 99243 OFF/OP CNSLTJ NEW/EST LOW 30: CPT | Performed by: NEUROLOGICAL SURGERY

## 2019-06-13 NOTE — PROGRESS NOTES
Zhanna KC Uneeda  1985  [id number]      Chief Complaint   Patient presents with   • Back Pain   • Leg Pain     bilateral       HISTORY OF PRESENT ILLNESS: This is a 33-year-old female who has a long history of back problems.  It is worse with prolonged sitting and lifting.  She has been to physical therapy only to receive a TENS unit which has not been beneficial.  Lumbar MRIs been performed she referred for neurosurgical consultation.    Past Medical History:   Diagnosis Date   • Abdominal pain    • Allergic rhinitis    • Arthritis     osteo   • Asthma    • Cough    • Depression    • Ectopic pregnancy    • History of transfusion    • HPV (human papilloma virus) infection    • Malaise and fatigue    • Respiratory abnormality    • Urinary tract infection    • Vitamin D deficiency        Past Surgical History:   Procedure Laterality Date   • DILATATION AND CURETTAGE     • SALPINGECTOMY Right    • WISDOM TOOTH EXTRACTION         Family History   Problem Relation Age of Onset   • Asthma Mother    • COPD Mother    • Diabetes Mother    • Hypertension Mother    • Arthritis Paternal Grandmother    • Cancer Paternal Grandmother    • COPD Paternal Grandmother    • Stroke Paternal Grandmother        Social History     Socioeconomic History   • Marital status:      Spouse name: Not on file   • Number of children: Not on file   • Years of education: Not on file   • Highest education level: Not on file   Tobacco Use   • Smoking status: Former Smoker     Types: Cigarettes     Last attempt to quit: 2006     Years since quittin.4   • Smokeless tobacco: Never Used   Substance and Sexual Activity   • Alcohol use: No   • Drug use: No   • Sexual activity: Yes     Partners: Male       Allergies   Allergen Reactions   • Penicillins Unknown (See Comments)     PT'S MOTHER TOLD HER SHE WAS ALLERGIC         Current Outpatient Medications:   •  albuterol sulfate  (90 Base) MCG/ACT inhaler, Inhale 2 puffs Every 4 (Four)  Hours As Needed for Shortness of Air., Disp: 1 inhaler, Rfl: 5  •  cetirizine (zyrTEC) 10 MG tablet, Take 1 tablet by mouth Every Night., Disp: 30 tablet, Rfl: 5  •  fluticasone (FLONASE) 50 MCG/ACT nasal spray, Administer 2 sprays both nostrils once daily, Disp: 1 bottle, Rfl: 5  •  ibuprofen (ADVIL,MOTRIN) 600 MG tablet, Take 1 tablet by mouth Every 6 (Six) Hours As Needed for Mild Pain ., Disp: 40 tablet, Rfl: 1  •  montelukast (SINGULAIR) 10 MG tablet, Take 1 tablet by mouth Every Night., Disp: 30 tablet, Rfl: 5  •  Prenatal Vit-Fe Fumarate-FA (PRENATAL VITAMIN 27-0.8) 27-0.8 MG tablet tablet, Take 1 tablet by mouth Daily. (Patient taking differently: Take 1 tablet by mouth Every Night.), Disp: 30 tablet, Rfl: 5    Review of Systems   Constitutional: Positive for appetite change and fatigue. Negative for activity change, chills, diaphoresis, fever and unexpected weight change.   HENT: Positive for congestion, sinus pressure and sore throat. Negative for dental problem, drooling, ear discharge, ear pain, facial swelling, hearing loss, mouth sores, nosebleeds, postnasal drip, rhinorrhea, sneezing, tinnitus, trouble swallowing and voice change.    Eyes: Positive for itching. Negative for photophobia, pain, discharge, redness and visual disturbance.   Respiratory: Positive for chest tightness and shortness of breath. Negative for apnea, cough, choking, wheezing and stridor.    Cardiovascular: Negative for chest pain, palpitations and leg swelling.   Gastrointestinal: Negative for abdominal distention, abdominal pain, anal bleeding, blood in stool, constipation, diarrhea, nausea, rectal pain and vomiting.   Endocrine: Positive for cold intolerance. Negative for heat intolerance, polydipsia, polyphagia and polyuria.   Genitourinary: Negative for decreased urine volume, difficulty urinating, dysuria, enuresis, flank pain, frequency, genital sores, hematuria and urgency.   Musculoskeletal: Positive for arthralgias, back  "pain and neck stiffness. Negative for gait problem, joint swelling, myalgias and neck pain.   Skin: Negative for color change, pallor, rash and wound.   Allergic/Immunologic: Positive for environmental allergies. Negative for food allergies and immunocompromised state.   Neurological: Positive for light-headedness, numbness and headaches. Negative for dizziness, tremors, seizures, syncope, facial asymmetry, speech difficulty and weakness.   Hematological: Negative for adenopathy. Does not bruise/bleed easily.   Psychiatric/Behavioral: Negative for agitation, behavioral problems, confusion, decreased concentration, dysphoric mood, hallucinations, self-injury, sleep disturbance and suicidal ideas. The patient is nervous/anxious. The patient is not hyperactive.        Vitals:    06/13/19 1056   BP: (!) 89/62   BP Location: Right arm   Patient Position: Sitting   Cuff Size: Adult   Temp: 97.3 °F (36.3 °C)   TempSrc: Temporal   Weight: 70.5 kg (155 lb 6.4 oz)   Height: 162.6 cm (64.02\")       Neurological Examination:    Mental status/speech: The patient is alert and oriented.  Speech is clear without aphysia or dysarthria.  No overt cognitive deficits.    Cranial nerve examination:    Olfaction: Smell is intact.  Vision: Vision is intact without visual field abnormalities.  Funduscopic examination is normal.  No pupillary irregularity.  Ocular motor examination: The extraocular muscles are intact.  There is no diplopia.  The pupil is round and reactive to both light and accommodation.  There is no nystagmus.  Facial movement/sensation: There is no facial weakness.  Sensation is intact in the first, second, and third divisions of the trigeminal nerve.  The corneal reflex is intact.  Auditory: Hearing is intact to finger rub bilaterally.  Cranial nerves IX, X, XI, XII: Phonation is normal.  No dysphagia.  Tongue is protruded in the midline without atrophy.  The gag reflex is intact.  Shoulder shrug is " normal.    Musculoligamentous ligamentous examination: She has exceedingly poor posture and poor core strength.  There is no weakness or reflex asymmetry.  She has slight decreased sensation along the sural nerve on the right.  This is not a radiculopathy rather an isolated peripheral nerve entrapment syndrome.  Her gait is normal.  Straight leg raising, is negative.    Medical Decision Making:     Diagnostic Data Set: The lumbar MRI shows minor disc degeneration L4-L5; L5-S1.      Assessment: Mild disc degeneration, poor core strength          Recommendations: The issue she has in my opinion are nonsurgical.  She has exceedingly poor strength in her core and posture.  This can be corrected with [place] exercise program and core strengthening.  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  Nothing need be done.  I have forwarded her to physical therapy for education.  Ergonomic evaluation of her workstation and correction will be helpful as well.        I greatly appreciate the opportunity to see and evaluate this individual.  If you have questions or concerns regarding issues that I may have overlooked please call me at any time: [phone].  Ernie Cruz M.D.  [facility]  [address] [place] [address]    Scribed for Sd Cruz MD by Marcell Christian CMA. 6/13/2019  11:16 AM    I have read and concur with the information provided by the scribe.  Sd Cruz MD

## 2019-07-16 ENCOUNTER — TRANSCRIBE ORDERS (OUTPATIENT)
Dept: ADMINISTRATIVE | Facility: HOSPITAL | Age: 34
End: 2019-07-16

## 2019-07-16 DIAGNOSIS — J32.4 CHRONIC PANSINUSITIS: Primary | ICD-10-CM

## 2019-07-23 ENCOUNTER — HOSPITAL ENCOUNTER (OUTPATIENT)
Dept: CT IMAGING | Facility: HOSPITAL | Age: 34
Discharge: HOME | End: 2019-07-23
Admitting: OTOLARYNGOLOGY

## 2019-07-23 DIAGNOSIS — J32.4 CHRONIC PANSINUSITIS: ICD-10-CM

## 2019-07-23 PROCEDURE — 70486 CT MAXILLOFACIAL W/O DYE: CPT

## 2019-07-23 PROCEDURE — 70486 CT MAXILLOFACIAL W/O DYE: CPT | Performed by: RADIOLOGY

## 2019-08-15 ENCOUNTER — OFFICE VISIT (OUTPATIENT)
Dept: FAMILY MEDICINE CLINIC | Facility: CLINIC | Age: 34
End: 2019-08-15

## 2019-08-15 VITALS
DIASTOLIC BLOOD PRESSURE: 60 MMHG | TEMPERATURE: 97.4 F | HEART RATE: 79 BPM | OXYGEN SATURATION: 98 % | BODY MASS INDEX: 24.41 KG/M2 | SYSTOLIC BLOOD PRESSURE: 90 MMHG | WEIGHT: 143 LBS | HEIGHT: 64 IN

## 2019-08-15 DIAGNOSIS — G89.29 CHRONIC BILATERAL LOW BACK PAIN WITH LEFT-SIDED SCIATICA: ICD-10-CM

## 2019-08-15 DIAGNOSIS — M54.42 ACUTE BILATERAL LOW BACK PAIN WITH LEFT-SIDED SCIATICA: ICD-10-CM

## 2019-08-15 DIAGNOSIS — J45.40 MODERATE PERSISTENT ASTHMA WITHOUT COMPLICATION: Primary | ICD-10-CM

## 2019-08-15 DIAGNOSIS — Z91.09 MULTIPLE ENVIRONMENTAL ALLERGIES: ICD-10-CM

## 2019-08-15 DIAGNOSIS — J30.89 OTHER ALLERGIC RHINITIS: ICD-10-CM

## 2019-08-15 DIAGNOSIS — J02.9 PHARYNGITIS, UNSPECIFIED ETIOLOGY: ICD-10-CM

## 2019-08-15 DIAGNOSIS — M54.42 CHRONIC BILATERAL LOW BACK PAIN WITH LEFT-SIDED SCIATICA: ICD-10-CM

## 2019-08-15 PROCEDURE — 99214 OFFICE O/P EST MOD 30 MIN: CPT | Performed by: NURSE PRACTITIONER

## 2019-08-15 RX ORDER — IBUPROFEN 600 MG/1
600 TABLET ORAL EVERY 6 HOURS PRN
Qty: 40 TABLET | Refills: 1 | Status: SHIPPED | OUTPATIENT
Start: 2019-08-15 | End: 2020-02-13

## 2019-08-15 RX ORDER — ALBUTEROL SULFATE 90 UG/1
2 AEROSOL, METERED RESPIRATORY (INHALATION) EVERY 4 HOURS PRN
Qty: 1 INHALER | Refills: 5 | Status: SHIPPED | OUTPATIENT
Start: 2019-08-15 | End: 2020-02-13 | Stop reason: SDUPTHER

## 2019-08-15 RX ORDER — FLUTICASONE PROPIONATE 50 MCG
SPRAY, SUSPENSION (ML) NASAL
Qty: 1 BOTTLE | Refills: 5 | Status: SHIPPED | OUTPATIENT
Start: 2019-08-15 | End: 2020-07-09 | Stop reason: SDUPTHER

## 2019-08-15 RX ORDER — AZITHROMYCIN 250 MG/1
TABLET, FILM COATED ORAL
Qty: 6 TABLET | Refills: 0 | Status: SHIPPED | OUTPATIENT
Start: 2019-08-15 | End: 2020-02-13

## 2019-08-15 RX ORDER — CETIRIZINE HYDROCHLORIDE 10 MG/1
10 TABLET ORAL NIGHTLY
Qty: 30 TABLET | Refills: 5 | Status: SHIPPED | OUTPATIENT
Start: 2019-08-15 | End: 2020-02-13 | Stop reason: SDUPTHER

## 2019-08-15 RX ORDER — MONTELUKAST SODIUM 10 MG/1
10 TABLET ORAL NIGHTLY
Qty: 30 TABLET | Refills: 5 | Status: SHIPPED | OUTPATIENT
Start: 2019-08-15 | End: 2020-02-13 | Stop reason: SDUPTHER

## 2019-08-15 NOTE — PROGRESS NOTES
"Yoni Veras is a 33 y.o. female.     Chief Complaint   Patient presents with   • Sore Throat   • Asthma   • Allergies       History of Present Illness:    Back pain -  Doing better with physical therapy, activity or sitting for prolonged periods makes it worse.     Asthma - stable with current medications     Environmental allergy - recent allergy testing with positive testing to grass, roaches and cats. She is going to start allergy injections.     Acute pharyngitis-kids have had pharyngitis.  Symptoms are moderate.  Present x3 days.  Over-the-counter medications have not been helpful.    The following portions of the patient's history were reviewed and updated as appropriate:  Allergies, current medications, past family history, past medical history, past social history, past surgical history and problem list.    Review of Systems   Constitutional: Positive for fatigue. Negative for activity change, appetite change, chills and fever.   HENT: Positive for postnasal drip and sore throat.    Eyes:        Glasses   Respiratory: Negative.    Cardiovascular: Negative.    Gastrointestinal: Negative.    Endocrine: Negative.    Genitourinary: Negative.    Musculoskeletal: Positive for back pain.   Skin: Negative.    Allergic/Immunologic: Positive for environmental allergies. Negative for immunocompromised state.   Neurological: Negative.    Hematological: Negative.    Psychiatric/Behavioral: Negative for dysphoric mood, self-injury and suicidal ideas.   All other systems reviewed and are negative.      Objective     BP 90/60   Pulse 79   Temp 97.4 °F (36.3 °C) (Tympanic)   Ht 162.6 cm (64.02\")   Wt 64.9 kg (143 lb)   SpO2 98%   BMI 24.53 kg/m²   Lab on 06/11/2019   Component Date Value Ref Range Status   • Throat Culture, Beta Strep 06/11/2019 No Beta Hemolytic Streptococcus Isolated at 2 days   Final       Physical Exam   Constitutional: She is oriented to person, place, and time. Vital signs are " normal. She appears well-developed and well-nourished.  Non-toxic appearance. She does not have a sickly appearance. She does not appear ill. No distress.   Pleasant 33-year-old female.   HENT:   Head: Normocephalic and atraumatic.   Right Ear: Tympanic membrane, external ear and ear canal normal.   Left Ear: Tympanic membrane, external ear and ear canal normal.   Nose: Nose normal.   Mouth/Throat: Mucous membranes are normal. Oropharyngeal exudate and posterior oropharyngeal erythema present. Tonsils are 3+ on the right. Tonsils are 3+ on the left.   Eyes: Conjunctivae, EOM and lids are normal. Pupils are equal, round, and reactive to light. Right eye exhibits no discharge. Left eye exhibits no discharge.   Neck: Normal range of motion. Neck supple. No tracheal deviation present. No thyromegaly present.   Cardiovascular: Normal rate, regular rhythm, normal heart sounds and intact distal pulses. Exam reveals no gallop and no friction rub.   No murmur heard.  Pulmonary/Chest: Effort normal and breath sounds normal. No respiratory distress. She has no wheezes. She has no rales. She exhibits no tenderness.   Abdominal: Soft. Normal appearance and bowel sounds are normal. She exhibits no distension and no mass. There is no tenderness. There is no rebound and no guarding.   Musculoskeletal: Normal range of motion.        Lumbar back: She exhibits tenderness, pain and spasm.   Decreased lumbar curvature, there is pain with forward flexion. DTR's +2. No edema.    Lymphadenopathy:        Head (right side): Tonsillar adenopathy present.        Head (left side): Tonsillar adenopathy present.     She has cervical adenopathy.        Right cervical: Superficial cervical adenopathy present.        Left cervical: Superficial cervical adenopathy present.   Neurological: She is alert and oriented to person, place, and time. She has normal reflexes.   CN 2-12 grossly intact    Skin: Skin is warm and dry. Capillary refill takes less  than 2 seconds. No rash noted. She is not diaphoretic. No erythema. No pallor.   Psychiatric: She has a normal mood and affect. Her speech is normal and behavior is normal. Judgment and thought content normal. Her affect is not inappropriate. Cognition and memory are normal.   Nursing note and vitals reviewed.      Assessment/Plan     Problem List Items Addressed This Visit        Respiratory    Moderate persistent asthma without complication - Primary    Current Assessment & Plan     Stable             Relevant Medications    cetirizine (zyrTEC) 10 MG tablet    albuterol sulfate  (90 Base) MCG/ACT inhaler    montelukast (SINGULAIR) 10 MG tablet    Other Relevant Orders    CBC & Differential    Comprehensive Metabolic Panel    TSH    Hemoglobin A1c    Vitamin D 25 Hydroxy    Vitamin B12    Lipid Panel       Nervous and Auditory    Chronic bilateral low back pain with left-sided sciatica    Current Assessment & Plan     Remain in physical therapy.  Body mechanics reviewed.         Relevant Medications    ibuprofen (ADVIL,MOTRIN) 600 MG tablet       Other    Multiple environmental allergies    Overview     Grass, roaches, trees and cats          Current Assessment & Plan     Allergy precautions.  Carry EpiPen.  Avoid known allergens.  Refill routine medications.         Relevant Medications    EPINEPHrine (EPIPEN IJ)    fluticasone (FLONASE) 50 MCG/ACT nasal spray    Other Relevant Orders    CBC & Differential    Comprehensive Metabolic Panel    TSH    Hemoglobin A1c    Vitamin D 25 Hydroxy    Vitamin B12    Lipid Panel      Other Visit Diagnoses     Other allergic rhinitis        refill routine medications  allergy precautions      Relevant Medications    cetirizine (zyrTEC) 10 MG tablet    montelukast (SINGULAIR) 10 MG tablet    Other Relevant Orders    CBC & Differential    Comprehensive Metabolic Panel    TSH    Hemoglobin A1c    Vitamin D 25 Hydroxy    Vitamin B12    Lipid Panel    Pharyngitis, unspecified  etiology        Relevant Medications    azithromycin (ZITHROMAX Z-NATY) 250 MG tablet          Current Outpatient Medications:   •  albuterol sulfate  (90 Base) MCG/ACT inhaler, Inhale 2 puffs Every 4 (Four) Hours As Needed for Shortness of Air., Disp: 1 inhaler, Rfl: 5  •  cetirizine (zyrTEC) 10 MG tablet, Take 1 tablet by mouth Every Night., Disp: 30 tablet, Rfl: 5  •  EPINEPHrine (EPIPEN IJ), Inject  as directed., Disp: , Rfl:   •  fluticasone (FLONASE) 50 MCG/ACT nasal spray, Administer 2 sprays both nostrils once daily, Disp: 1 bottle, Rfl: 5  •  ibuprofen (ADVIL,MOTRIN) 600 MG tablet, Take 1 tablet by mouth Every 6 (Six) Hours As Needed for Mild Pain ., Disp: 40 tablet, Rfl: 1  •  montelukast (SINGULAIR) 10 MG tablet, Take 1 tablet by mouth Every Night., Disp: 30 tablet, Rfl: 5  •  Prenatal Vit-Fe Fumarate-FA (PRENATAL VITAMIN 27-0.8) 27-0.8 MG tablet tablet, Take 1 tablet by mouth Daily. (Patient taking differently: Take 1 tablet by mouth Every Night.), Disp: 30 tablet, Rfl: 5  •  azithromycin (ZITHROMAX Z-NATY) 250 MG tablet, Take 2 tablets the first day, then 1 tablet daily for 4 days., Disp: 6 tablet, Rfl: 0       Patient's Body mass index is 24.53 kg/m². BMI is within normal parameters. No follow-up required..      I have discussed diagnosis in detail today allowing time for questions and answers. Patient is aware of reasons to seek urgent or emergent medical care as well as reasons to return to the clinic for evaluation. Possible side effects, interactions and progression of symptoms discussed as well. Patient / family states understanding.   Emotional support and active listening provided.       Fluids, rest, symptomatic treatment advised. Steam therapy. Dispose of tooth bush after 24 hours of starting antibiotics if ordered. Complete antibiotics as ordered.  Discussed possible side effects/interactions of medication. Discussed symptoms to report as well as reasons to seek urgent or emergent  medical attention. Understanding stated.   Recommend follow up in 2-3 days if not improved, sooner if needed.     Follow up in 3-6  months. Routine labs fasting one week prior to next office visit. Return sooner if needed.         This document has been electronically signed by:  NABEEL Hill, NP-C

## 2020-02-11 ENCOUNTER — LAB (OUTPATIENT)
Dept: FAMILY MEDICINE CLINIC | Facility: CLINIC | Age: 35
End: 2020-02-11

## 2020-02-11 DIAGNOSIS — Z91.09 MULTIPLE ENVIRONMENTAL ALLERGIES: ICD-10-CM

## 2020-02-11 DIAGNOSIS — J45.40 MODERATE PERSISTENT ASTHMA WITHOUT COMPLICATION: ICD-10-CM

## 2020-02-11 DIAGNOSIS — M54.42 ACUTE BILATERAL LOW BACK PAIN WITH LEFT-SIDED SCIATICA: ICD-10-CM

## 2020-02-11 DIAGNOSIS — J30.89 OTHER ALLERGIC RHINITIS: ICD-10-CM

## 2020-02-12 LAB
25(OH)D3+25(OH)D2 SERPL-MCNC: 23.9 NG/ML (ref 30–100)
ALBUMIN SERPL-MCNC: 4.2 G/DL (ref 3.5–5.2)
ALBUMIN/GLOB SERPL: 2 G/DL
ALP SERPL-CCNC: 60 U/L (ref 39–117)
ALT SERPL-CCNC: 9 U/L (ref 1–33)
AST SERPL-CCNC: 13 U/L (ref 1–32)
BASOPHILS # BLD AUTO: 0.02 10*3/MM3 (ref 0–0.2)
BASOPHILS NFR BLD AUTO: 0.5 % (ref 0–1.5)
BILIRUB SERPL-MCNC: 0.2 MG/DL (ref 0.2–1.2)
BUN SERPL-MCNC: 20 MG/DL (ref 6–20)
BUN/CREAT SERPL: 33.9 (ref 7–25)
CALCIUM SERPL-MCNC: 8.8 MG/DL (ref 8.6–10.5)
CHLORIDE SERPL-SCNC: 105 MMOL/L (ref 98–107)
CHOLEST SERPL-MCNC: 149 MG/DL (ref 0–200)
CO2 SERPL-SCNC: 24.1 MMOL/L (ref 22–29)
CREAT SERPL-MCNC: 0.59 MG/DL (ref 0.57–1)
EOSINOPHIL # BLD AUTO: 0.08 10*3/MM3 (ref 0–0.4)
EOSINOPHIL NFR BLD AUTO: 1.9 % (ref 0.3–6.2)
ERYTHROCYTE [DISTWIDTH] IN BLOOD BY AUTOMATED COUNT: 12.3 % (ref 12.3–15.4)
GLOBULIN SER CALC-MCNC: 2.1 GM/DL
GLUCOSE SERPL-MCNC: 91 MG/DL (ref 65–99)
HBA1C MFR BLD: 5.1 % (ref 4.8–5.6)
HCT VFR BLD AUTO: 35 % (ref 34–46.6)
HDLC SERPL-MCNC: 44 MG/DL (ref 40–60)
HGB BLD-MCNC: 11.6 G/DL (ref 12–15.9)
IMM GRANULOCYTES # BLD AUTO: 0.01 10*3/MM3 (ref 0–0.05)
IMM GRANULOCYTES NFR BLD AUTO: 0.2 % (ref 0–0.5)
LDLC SERPL CALC-MCNC: 96 MG/DL (ref 0–100)
LYMPHOCYTES # BLD AUTO: 1.36 10*3/MM3 (ref 0.7–3.1)
LYMPHOCYTES NFR BLD AUTO: 32.8 % (ref 19.6–45.3)
MCH RBC QN AUTO: 28.8 PG (ref 26.6–33)
MCHC RBC AUTO-ENTMCNC: 33.1 G/DL (ref 31.5–35.7)
MCV RBC AUTO: 86.8 FL (ref 79–97)
MONOCYTES # BLD AUTO: 0.26 10*3/MM3 (ref 0.1–0.9)
MONOCYTES NFR BLD AUTO: 6.3 % (ref 5–12)
NEUTROPHILS # BLD AUTO: 2.42 10*3/MM3 (ref 1.7–7)
NEUTROPHILS NFR BLD AUTO: 58.3 % (ref 42.7–76)
NRBC BLD AUTO-RTO: 0 /100 WBC (ref 0–0.2)
PLATELET # BLD AUTO: 185 10*3/MM3 (ref 140–450)
POTASSIUM SERPL-SCNC: 4.3 MMOL/L (ref 3.5–5.2)
PROT SERPL-MCNC: 6.3 G/DL (ref 6–8.5)
RBC # BLD AUTO: 4.03 10*6/MM3 (ref 3.77–5.28)
SODIUM SERPL-SCNC: 139 MMOL/L (ref 136–145)
TRIGL SERPL-MCNC: 47 MG/DL (ref 0–150)
TSH SERPL DL<=0.005 MIU/L-ACNC: 2.68 UIU/ML (ref 0.27–4.2)
VIT B12 SERPL-MCNC: 456 PG/ML (ref 211–946)
VLDLC SERPL CALC-MCNC: 9.4 MG/DL
WBC # BLD AUTO: 4.15 10*3/MM3 (ref 3.4–10.8)

## 2020-02-13 ENCOUNTER — OFFICE VISIT (OUTPATIENT)
Dept: FAMILY MEDICINE CLINIC | Facility: CLINIC | Age: 35
End: 2020-02-13

## 2020-02-13 ENCOUNTER — TELEPHONE (OUTPATIENT)
Dept: FAMILY MEDICINE CLINIC | Facility: CLINIC | Age: 35
End: 2020-02-13

## 2020-02-13 VITALS
BODY MASS INDEX: 23.73 KG/M2 | DIASTOLIC BLOOD PRESSURE: 70 MMHG | OXYGEN SATURATION: 98 % | WEIGHT: 139 LBS | HEART RATE: 78 BPM | HEIGHT: 64 IN | SYSTOLIC BLOOD PRESSURE: 104 MMHG | TEMPERATURE: 98.4 F

## 2020-02-13 DIAGNOSIS — D50.8 IRON DEFICIENCY ANEMIA SECONDARY TO INADEQUATE DIETARY IRON INTAKE: ICD-10-CM

## 2020-02-13 DIAGNOSIS — J45.40 MODERATE PERSISTENT ASTHMA WITHOUT COMPLICATION: ICD-10-CM

## 2020-02-13 DIAGNOSIS — J30.89 OTHER ALLERGIC RHINITIS: ICD-10-CM

## 2020-02-13 DIAGNOSIS — G43.009 MIGRAINE WITHOUT AURA AND WITHOUT STATUS MIGRAINOSUS, NOT INTRACTABLE: ICD-10-CM

## 2020-02-13 DIAGNOSIS — E66.3 OVERWEIGHT (BMI 25.0-29.9): ICD-10-CM

## 2020-02-13 DIAGNOSIS — F41.1 GENERALIZED ANXIETY DISORDER: Primary | ICD-10-CM

## 2020-02-13 DIAGNOSIS — R79.89 ABNORMAL BUN-TO-CREATININE RATIO: ICD-10-CM

## 2020-02-13 PROCEDURE — 99395 PREV VISIT EST AGE 18-39: CPT | Performed by: NURSE PRACTITIONER

## 2020-02-13 RX ORDER — ERGOCALCIFEROL 1.25 MG/1
50000 CAPSULE ORAL WEEKLY
Qty: 5 CAPSULE | Refills: 5 | Status: SHIPPED | OUTPATIENT
Start: 2020-02-13 | End: 2020-07-09 | Stop reason: SDUPTHER

## 2020-02-13 RX ORDER — MONTELUKAST SODIUM 10 MG/1
10 TABLET ORAL NIGHTLY
Qty: 30 TABLET | Refills: 5 | Status: SHIPPED | OUTPATIENT
Start: 2020-02-13 | End: 2020-07-09 | Stop reason: SDUPTHER

## 2020-02-13 RX ORDER — CETIRIZINE HYDROCHLORIDE 10 MG/1
10 TABLET ORAL NIGHTLY
Qty: 30 TABLET | Refills: 5 | Status: SHIPPED | OUTPATIENT
Start: 2020-02-13 | End: 2020-07-09 | Stop reason: SDUPTHER

## 2020-02-13 RX ORDER — ESCITALOPRAM OXALATE 10 MG/1
10 TABLET ORAL DAILY
Qty: 30 TABLET | Refills: 5 | Status: SHIPPED | OUTPATIENT
Start: 2020-02-13 | End: 2020-07-09 | Stop reason: DRUGHIGH

## 2020-02-13 RX ORDER — SULFAMETHOXAZOLE AND TRIMETHOPRIM 800; 160 MG/1; MG/1
1 TABLET ORAL 2 TIMES DAILY
Qty: 20 TABLET | Refills: 0 | Status: SHIPPED | OUTPATIENT
Start: 2020-02-13 | End: 2020-02-23

## 2020-02-13 RX ORDER — ALBUTEROL SULFATE 90 UG/1
2 AEROSOL, METERED RESPIRATORY (INHALATION) EVERY 4 HOURS PRN
Qty: 1 INHALER | Refills: 5 | Status: SHIPPED | OUTPATIENT
Start: 2020-02-13 | End: 2020-08-20 | Stop reason: SDUPTHER

## 2020-02-13 RX ORDER — FERROUS SULFATE 325(65) MG
325 TABLET ORAL 2 TIMES DAILY
Qty: 60 TABLET | Refills: 5 | Status: SHIPPED | OUTPATIENT
Start: 2020-02-13 | End: 2020-07-09

## 2020-02-13 RX ORDER — ERGOCALCIFEROL 1.25 MG/1
50000 CAPSULE ORAL WEEKLY
Qty: 5 CAPSULE | Refills: 5 | Status: SHIPPED | OUTPATIENT
Start: 2020-02-13 | End: 2020-02-13 | Stop reason: SDUPTHER

## 2020-02-13 RX ORDER — FERROUS SULFATE 325(65) MG
325 TABLET ORAL 2 TIMES DAILY
Qty: 60 TABLET | Refills: 5 | Status: SHIPPED | OUTPATIENT
Start: 2020-02-13 | End: 2020-02-13 | Stop reason: SDUPTHER

## 2020-02-13 NOTE — PROGRESS NOTES
Subjective   Zhanna Veras is a 34 y.o. female.     Chief Complaint   Patient presents with   • Annual Exam     Yearly physical  Having some headaches     History of Present Illness:    Recent uri with doxycycline treatment 2 weeks ago. Feeling some better. Had chest xray at [facility] but was not told.     Frequent asthma exacerbations. She uses her inhaler several times a week. Currently takes zyrtec, singulair and albuterol.     New dx vit d def - low vit d level     Migraines several times a week. Start behind her eyes and radiate backwards from her frontal regions. Bilateral , throbbing, light sensitive and noise sensitive. Has been taking motrin every day for her migraines. Seems worse with stress. Motrin not helping much.    Anxiety - has four children from age 13 through toddler, her  is full time D.O. Student. She is stressed. Denies thoughts of hurting self or others.     Not sleeping well due to anxiety.     Anemia- recurrent due to low iron intake in diet.       The following portions of the patient's history and ROS were reviewed and updated as appropriate per provider:  Allergies, current medications, past family history, past medical history, past social history, past surgical history and problem list.    Review of Systems   Constitutional: Positive for activity change and fatigue. Negative for appetite change, fever and unexpected weight change.   HENT: Positive for sinus pressure and sinus pain. Negative for ear pain, sore throat and trouble swallowing.    Eyes: Positive for visual disturbance.   Respiratory: Positive for cough. Negative for chest tightness and shortness of breath.    Cardiovascular: Negative.    Gastrointestinal: Negative for abdominal pain, blood in stool and constipation.   Endocrine: Negative.    Genitourinary: Negative for decreased urine volume, dysuria, flank pain and frequency.   Musculoskeletal: Positive for back pain and neck pain. Negative for gait problem,  "joint swelling and neck stiffness.   Skin: Negative.    Allergic/Immunologic: Positive for environmental allergies. Negative for food allergies and immunocompromised state.   Neurological: Positive for headaches. Negative for dizziness, seizures, speech difficulty and light-headedness.   Hematological: Negative.    Psychiatric/Behavioral: Positive for sleep disturbance. Negative for dysphoric mood, self-injury and suicidal ideas.       Objective     /70   Pulse 78   Temp 98.4 °F (36.9 °C) (Temporal)   Ht 162.6 cm (64.02\")   Wt 63 kg (139 lb)   SpO2 98%   BMI 23.84 kg/m²   Lab on 02/11/2020   Component Date Value Ref Range Status   • Total Cholesterol 02/11/2020 149  0 - 200 mg/dL Final   • Triglycerides 02/11/2020 47  0 - 150 mg/dL Final   • HDL Cholesterol 02/11/2020 44  40 - 60 mg/dL Final   • VLDL Cholesterol 02/11/2020 9.4  mg/dL Final   • LDL Cholesterol  02/11/2020 96  0 - 100 mg/dL Final   • Vitamin B-12 02/11/2020 456  211 - 946 pg/mL Final   • 25 Hydroxy, Vitamin D 02/11/2020 23.9* 30.0 - 100.0 ng/ml Final   • Hemoglobin A1C 02/11/2020 5.10  4.80 - 5.60 % Final   • TSH 02/11/2020 2.680  0.270 - 4.200 uIU/mL Final   • Glucose 02/11/2020 91  65 - 99 mg/dL Final   • BUN 02/11/2020 20  6 - 20 mg/dL Final   • Creatinine 02/11/2020 0.59  0.57 - 1.00 mg/dL Final   • eGFR Non  Am 02/11/2020 117  >60 mL/min/1.73 Final   • eGFR African Am 02/11/2020 141  >60 mL/min/1.73 Final   • BUN/Creatinine Ratio 02/11/2020 33.9* 7.0 - 25.0 Final   • Sodium 02/11/2020 139  136 - 145 mmol/L Final   • Potassium 02/11/2020 4.3  3.5 - 5.2 mmol/L Final   • Chloride 02/11/2020 105  98 - 107 mmol/L Final   • Total CO2 02/11/2020 24.1  22.0 - 29.0 mmol/L Final   • Calcium 02/11/2020 8.8  8.6 - 10.5 mg/dL Final   • Total Protein 02/11/2020 6.3  6.0 - 8.5 g/dL Final   • Albumin 02/11/2020 4.20  3.50 - 5.20 g/dL Final   • Globulin 02/11/2020 2.1  gm/dL Final   • A/G Ratio 02/11/2020 2.0  g/dL Final   • Total Bilirubin " 02/11/2020 0.2  0.2 - 1.2 mg/dL Final   • Alkaline Phosphatase 02/11/2020 60  39 - 117 U/L Final   • AST (SGOT) 02/11/2020 13  1 - 32 U/L Final   • ALT (SGPT) 02/11/2020 9  1 - 33 U/L Final   • WBC 02/11/2020 4.15  3.40 - 10.80 10*3/mm3 Final   • RBC 02/11/2020 4.03  3.77 - 5.28 10*6/mm3 Final   • Hemoglobin 02/11/2020 11.6* 12.0 - 15.9 g/dL Final   • Hematocrit 02/11/2020 35.0  34.0 - 46.6 % Final   • MCV 02/11/2020 86.8  79.0 - 97.0 fL Final   • MCH 02/11/2020 28.8  26.6 - 33.0 pg Final   • MCHC 02/11/2020 33.1  31.5 - 35.7 g/dL Final   • RDW 02/11/2020 12.3  12.3 - 15.4 % Final   • Platelets 02/11/2020 185  140 - 450 10*3/mm3 Final   • Neutrophil Rel % 02/11/2020 58.3  42.7 - 76.0 % Final   • Lymphocyte Rel % 02/11/2020 32.8  19.6 - 45.3 % Final   • Monocyte Rel % 02/11/2020 6.3  5.0 - 12.0 % Final   • Eosinophil Rel % 02/11/2020 1.9  0.3 - 6.2 % Final   • Basophil Rel % 02/11/2020 0.5  0.0 - 1.5 % Final   • Neutrophils Absolute 02/11/2020 2.42  1.70 - 7.00 10*3/mm3 Final   • Lymphocytes Absolute 02/11/2020 1.36  0.70 - 3.10 10*3/mm3 Final   • Monocytes Absolute 02/11/2020 0.26  0.10 - 0.90 10*3/mm3 Final   • Eosinophils Absolute 02/11/2020 0.08  0.00 - 0.40 10*3/mm3 Final   • Basophils Absolute 02/11/2020 0.02  0.00 - 0.20 10*3/mm3 Final   • Immature Granulocyte Rel % 02/11/2020 0.2  0.0 - 0.5 % Final   • Immature Grans Absolute 02/11/2020 0.01  0.00 - 0.05 10*3/mm3 Final   • nRBC 02/11/2020 0.0  0.0 - 0.2 /100 WBC Final       Physical Exam   Constitutional: She is oriented to person, place, and time. Vital signs are normal. She appears well-developed and well-nourished. No distress.   HENT:   Head: Normocephalic and atraumatic.   Right Ear: External ear and ear canal normal. Tympanic membrane is erythematous and bulging. Tympanic membrane is not perforated.   Left Ear: External ear and ear canal normal. Tympanic membrane is erythematous and bulging. Tympanic membrane is not perforated.   Nose: Nose normal.      Mouth/Throat: Oropharynx is clear and moist. No oropharyngeal exudate.   Eyes: Pupils are equal, round, and reactive to light. Conjunctivae, EOM and lids are normal. Right eye exhibits no discharge. Left eye exhibits no discharge.   Neck: Normal range of motion. Neck supple. Muscular tenderness present. No spinous process tenderness present. No neck rigidity. No tracheal deviation and normal range of motion present. No thyromegaly present.   Cardiovascular: Normal rate, regular rhythm, normal heart sounds and normal pulses. Exam reveals no gallop and no friction rub.   No murmur heard.  Pulmonary/Chest: Effort normal and breath sounds normal. No respiratory distress. She has no wheezes. She has no rhonchi. She has no rales. She exhibits no tenderness.   Abdominal: Soft. Normal appearance and bowel sounds are normal. She exhibits no distension and no mass. There is no tenderness. There is no rebound and no guarding.   Musculoskeletal: Normal range of motion.        Lumbar back: She exhibits tenderness.   Lymphadenopathy:     She has no cervical adenopathy.   Neurological: She is alert and oriented to person, place, and time. She has normal reflexes. She is not disoriented. No cranial nerve deficit or sensory deficit.   CN 2-12 grossly intact    Skin: Skin is warm and dry. Capillary refill takes less than 2 seconds. No rash noted. She is not diaphoretic. No erythema.   Psychiatric: She has a normal mood and affect. Her speech is normal and behavior is normal. Judgment and thought content normal. Cognition and memory are normal. She is attentive.   Vitals reviewed.      Assessment/Plan     Problem List Items Addressed This Visit        Cardiovascular and Mediastinum    Migraine without aura and without status migrainosus, not intractable    Current Assessment & Plan     Headaches are newly identified.  Advised to keep a headache diary.  Counseled regarding lifestyle modifications.  Medication changes per  orders.  Follow up in 4 weeks, or sooner should new symptoms or problems arise.       Avoid known triggers such as stress, noise and lack of sleep.   Start Ubrelvy tablets, take one at onset of migraine. Sample starter kit provided lot [id number].   Reviewed risk vs benefits with patient and her . Both state understanding.          Relevant Medications    escitalopram (LEXAPRO) 10 MG tablet       Respiratory    Moderate persistent asthma without complication    Relevant Medications    montelukast (SINGULAIR) 10 MG tablet    albuterol sulfate  (90 Base) MCG/ACT inhaler    cetirizine (zyrTEC) 10 MG tablet       Hematopoietic and Hemostatic    Iron deficiency anemia secondary to inadequate dietary iron intake    Relevant Medications    ferrous sulfate 325 (65 FE) MG tablet       Other    Generalized anxiety disorder - Primary    Relevant Medications    escitalopram (LEXAPRO) 10 MG tablet    Abnormal BUN-to-creatinine ratio    Overview     Recovering from recent illness, taking motrin daily          Current Assessment & Plan     Stop daily motrin use. Increase fluids. Repeat cmp in 4-6 weeks.         RESOLVED: Overweight (BMI 25.0-29.9)      Other Visit Diagnoses     Other allergic rhinitis        refill routine medications  allergy precautions      Relevant Medications    montelukast (SINGULAIR) 10 MG tablet    cetirizine (zyrTEC) 10 MG tablet          Labs reviewed and discussed. New diagnosis made today. New medications started.     Age appropriate education and safety instruction has been provided. Preventive education/recommendation > 15 minutes. Safety, accident prevention, vaccines, health maintenance concerns, nutrition, diet, exercise and social activity.            Patient's Body mass index is 23.84 kg/m². BMI is within normal parameters. No follow-up required..    Zhanna Veras  reports that she quit smoking about 14 years ago. Her smoking use included cigarettes. She has never used smokeless  tobacco.. I have educated her on the risk of diseases from using tobacco products such as cancer, COPD and heart diease.       I have discussed diagnosis in detail today allowing time for questions and answers. Patient is aware of reasons to seek urgent or emergent medical care as well as reasons to return to the clinic for evaluation. Possible side effects, interactions and progression of symptoms discussed as well. Patient / family states understanding.   Emotional support and active listening provided.       Start lexapro this weekend. Discussed side effects and interactions. Self calming methods discussed.       I would like to see her back in a few weeks to evaluate the effectiveness of new medications as well as to repeat cmp.           This document has been electronically signed by:  NABEEL Hill, NP-C

## 2020-02-13 NOTE — TELEPHONE ENCOUNTER
----- Message from NABEEL Jenkins sent at 2/12/2020  2:12 PM EST -----  Ferrous sulfate 325 mg twice daily with meals.  Also tell her I want her taking a vitamin D 50,000 IU weekly, call in #4 with 5 refills.  Also want her to  a vitamin D 400 IU to take daily for the next couple of months.

## 2020-02-16 PROBLEM — J31.2 CHRONIC PHARYNGITIS: Status: RESOLVED | Noted: 2019-05-23 | Resolved: 2020-02-16
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
## 2020-02-16 PROBLEM — G43.009 MIGRAINE WITHOUT AURA AND WITHOUT STATUS MIGRAINOSUS, NOT INTRACTABLE: Status: ACTIVE | Noted: 2020-02-16

## 2020-02-16 PROBLEM — O99.891 BACK PAIN AFFECTING PREGNANCY: Status: RESOLVED | Noted: 2018-05-02 | Resolved: 2020-02-16

## 2020-02-16 PROBLEM — M54.9 BACK PAIN AFFECTING PREGNANCY: Status: RESOLVED | Noted: 2018-05-02 | Resolved: 2020-02-16

## 2020-02-16 PROBLEM — D50.8 IRON DEFICIENCY ANEMIA SECONDARY TO INADEQUATE DIETARY IRON INTAKE: Status: ACTIVE | Noted: 2020-02-16

## 2020-02-16 PROBLEM — E66.3 OVERWEIGHT (BMI 25.0-29.9): Status: RESOLVED | Noted: 2019-02-28 | Resolved: 2020-02-16

## 2020-02-16 PROBLEM — R07.2 PRECORDIAL PAIN: Status: RESOLVED | Noted: 2018-04-05 | Resolved: 2020-02-16
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
## 2020-02-16 PROBLEM — R79.89 ABNORMAL BUN-TO-CREATININE RATIO: Status: ACTIVE | Noted: 2020-02-16

## 2020-02-16 NOTE — ASSESSMENT & PLAN NOTE
Headaches are newly identified.  Advised to keep a headache diary.  Counseled regarding lifestyle modifications.  Medication changes per orders.  Follow up in 4 weeks, or sooner should new symptoms or problems arise.       Avoid known triggers such as stress, noise and lack of sleep.   Start Ubrelvy tablets, take one at onset of migraine. Sample starter kit provided lot [id number].   Reviewed risk vs benefits with patient and her . Both state understanding.

## 2020-02-17 ENCOUNTER — TELEPHONE (OUTPATIENT)
Dept: FAMILY MEDICINE CLINIC | Facility: CLINIC | Age: 35
End: 2020-02-17

## 2020-02-17 NOTE — TELEPHONE ENCOUNTER
Pt is aware of this information.     ----- Message from NABEEL Jenkins sent at 2/16/2020  6:42 AM EST -----  Let her know I sent in a [place] vit with iron. I want her taking two a day for anemia.

## 2020-02-21 ENCOUNTER — TELEPHONE (OUTPATIENT)
Dept: FAMILY MEDICINE CLINIC | Facility: CLINIC | Age: 35
End: 2020-02-21

## 2020-03-31 ENCOUNTER — TELEMEDICINE (OUTPATIENT)
Dept: FAMILY MEDICINE CLINIC | Facility: CLINIC | Age: 35
End: 2020-03-31

## 2020-03-31 ENCOUNTER — E-VISIT (OUTPATIENT)
Dept: FAMILY MEDICINE CLINIC | Facility: CLINIC | Age: 35
End: 2020-03-31

## 2020-03-31 DIAGNOSIS — J45.40 MODERATE PERSISTENT ASTHMA WITHOUT COMPLICATION: Primary | ICD-10-CM

## 2020-03-31 PROCEDURE — 99213 OFFICE O/P EST LOW 20 MIN: CPT | Performed by: NURSE PRACTITIONER

## 2020-03-31 NOTE — PATIENT INSTRUCTIONS
Asthma, Adult    Asthma is a long-term (chronic) condition in which the airways get tight and narrow. The airways are the breathing passages that lead from the nose and mouth down into the lungs. A person with asthma will have times when symptoms get worse. These are called asthma attacks. They can cause coughing, whistling sounds when you breathe (wheezing), shortness of breath, and chest pain. They can make it hard to breathe. There is no cure for asthma, but medicines and lifestyle changes can help control it.  There are many things that can bring on an asthma attack or make asthma symptoms worse (triggers). Common triggers include:  · Mold.  · Dust.  · Cigarette smoke.  · Cockroaches.  · Things that can cause allergy symptoms (allergens). These include animal skin flakes (dander) and pollen from trees or grass.  · Things that pollute the air. These may include household , wood smoke, smog, or chemical odors.  · Cold air, weather changes, and wind.  · Crying or laughing hard.  · Stress.  · Certain medicines or drugs.  · Certain foods such as dried fruit, potato chips, and grape juice.  · Infections, such as a cold or the flu.  · Certain medical conditions or diseases.  · Exercise or tiring activities.  Asthma may be treated with medicines and by staying away from the things that cause asthma attacks. Types of medicines may include:  · Controller medicines. These help prevent asthma symptoms. They are usually taken every day.  · Fast-acting reliever or rescue medicines. These quickly relieve asthma symptoms. They are used as needed and provide short-term relief.  · Allergy medicines if your attacks are brought on by allergens.  · Medicines to help control the body's defense (immune) system.  Follow these instructions at home:  Avoiding triggers in your home  · Change your heating and air conditioning filter often.  · Limit your use of fireplaces and wood stoves.  · Get rid of pests (such as roaches and  mice) and their droppings.  · Throw away plants if you see mold on them.  · Clean your floors. Dust regularly. Use cleaning products that do not smell.  · Have someone vacuum when you are not home. Use a vacuum  with a HEPA filter if possible.  · Replace carpet with wood, tile, or vinyl alen. Carpet can trap animal skin flakes and dust.  · Use allergy-proof pillows, mattress covers, and box spring covers.  · Wash bed sheets and blankets every week in hot water. Dry them in a dryer.  · Keep your bedroom free of any triggers.   · Avoid pets and keep windows closed when things that cause allergy symptoms are in the air.  · Use blankets that are made of polyester or cotton.  · Clean bathrooms and elton with bleach. If possible, have someone repaint the walls in these rooms with mold-resistant paint. Keep out of the rooms that are being cleaned and painted.  · Wash your hands often with soap and water. If soap and water are not available, use hand .  · Do not allow anyone to smoke in your home.  General instructions  · Take over-the-counter and prescription medicines only as told by your doctor.  ? Talk with your doctor if you have questions about how or when to take your medicines.  ? Make note if you need to use your medicines more often than usual.  · Do not use any products that contain nicotine or tobacco, such as cigarettes and e-cigarettes. If you need help quitting, ask your doctor.  · Stay away from secondhand smoke.  · Avoid doing things outdoors when allergen counts are high and when air quality is low.  · Wear a ski mask when doing outdoor activities in the winter. The mask should cover your nose and mouth. Exercise indoors on cold days if you can.  · Warm up before you exercise. Take time to cool down after exercise.  · Use a peak flow meter as told by your doctor. A peak flow meter is a tool that measures how well the lungs are working.  · Keep track of the peak flow meter's readings.  Write them down.  · Follow your asthma action plan. This is a written plan for taking care of your asthma and treating your attacks.  · Make sure you get all the shots (vaccines) that your doctor recommends. Ask your doctor about a flu shot and a pneumonia shot.  · Keep all follow-up visits as told by your doctor. This is important.  Contact a doctor if:  · You have wheezing, shortness of breath, or a cough even while taking medicine to prevent attacks.  · The mucus you cough up (sputum) is thicker than usual.  · The mucus you cough up changes from clear or white to yellow, green, gray, or bloody.  · You have problems from the medicine you are taking, such as:  ? A rash.  ? Itching.  ? Swelling.  ? Trouble breathing.  · You need reliever medicines more than 2-3 times a week.  · Your peak flow reading is still at 50-79% of your personal best after following the action plan for 1 hour.  · You have a fever.  Get help right away if:  · You seem to be worse and are not responding to medicine during an asthma attack.  · You are short of breath even at rest.  · You get short of breath when doing very little activity.  · You have trouble eating, drinking, or talking.  · You have chest pain or tightness.  · You have a fast heartbeat.  · Your lips or fingernails start to turn blue.  · You are light-headed or dizzy, or you faint.  · Your peak flow is less than 50% of your personal best.  · You feel too tired to breathe normally.  Summary  · Asthma is a long-term (chronic) condition in which the airways get tight and narrow. An asthma attack can make it hard to breathe.  · Asthma cannot be cured, but medicines and lifestyle changes can help control it.  · Make sure you understand how to avoid triggers and how and when to use your medicines.  This information is not intended to replace advice given to you by your health care provider. Make sure you discuss any questions you have with your health care provider.  Document  Released: 06/05/2009 Document Revised: 01/22/2018 Document Reviewed: 01/22/2018  Elsevier Interactive Patient Education © 2020 [employer]

## 2020-03-31 NOTE — PROGRESS NOTES
Subjective   Zhanna Veras is a 34 y.o. female.     Chief Complaint   Patient presents with   • Asthma     Chief complaint  Worried about coronavirus and her asthma    History of Present Illness:    Patient works for the [employer] center where she is exposed to both children and community.  She has the availability to work at home.  She has frequent asthma exacerbations and is helping provide care for her elderly mother with COPD.      The following portions of the patient's history and ROS were reviewed and updated as appropriate per provider:  Allergies, current medications, past family history, past medical history, past social history, past surgical history and problem list.    Review of Systems   Constitutional: Negative for activity change, appetite change, chills, fatigue, fever and unexpected weight change.   HENT: Negative for congestion, sinus pressure, sinus pain and sore throat.    Eyes: Negative.    Respiratory: Positive for cough (Occasional). Negative for chest tightness, shortness of breath and wheezing.    Gastrointestinal: Negative for abdominal pain, nausea and vomiting.   Endocrine: Negative.    Genitourinary: Negative for dysuria, flank pain and frequency.   Musculoskeletal: Positive for back pain. Negative for gait problem, myalgias and neck stiffness.   Skin: Negative.    Allergic/Immunologic: Positive for environmental allergies. Negative for food allergies and immunocompromised state.   Neurological: Negative for dizziness, speech difficulty and light-headedness.   Hematological: Negative.    Psychiatric/Behavioral: Negative for dysphoric mood, self-injury and suicidal ideas. The patient is not nervous/anxious.        Objective     There were no vitals taken for this visit.  Lab on 02/11/2020   Component Date Value Ref Range Status   • Total Cholesterol 02/11/2020 149  0 - 200 mg/dL Final   • Triglycerides 02/11/2020 47  0 - 150 mg/dL Final   • HDL Cholesterol 02/11/2020 44  40 - 60  mg/dL Final   • VLDL Cholesterol 02/11/2020 9.4  mg/dL Final   • LDL Cholesterol  02/11/2020 96  0 - 100 mg/dL Final   • Vitamin B-12 02/11/2020 456  211 - 946 pg/mL Final   • 25 Hydroxy, Vitamin D 02/11/2020 23.9* 30.0 - 100.0 ng/ml Final   • Hemoglobin A1C 02/11/2020 5.10  4.80 - 5.60 % Final   • TSH 02/11/2020 2.680  0.270 - 4.200 uIU/mL Final   • Glucose 02/11/2020 91  65 - 99 mg/dL Final   • BUN 02/11/2020 20  6 - 20 mg/dL Final   • Creatinine 02/11/2020 0.59  0.57 - 1.00 mg/dL Final   • eGFR Non  Am 02/11/2020 117  >60 mL/min/1.73 Final   • eGFR African Am 02/11/2020 141  >60 mL/min/1.73 Final   • BUN/Creatinine Ratio 02/11/2020 33.9* 7.0 - 25.0 Final   • Sodium 02/11/2020 139  136 - 145 mmol/L Final   • Potassium 02/11/2020 4.3  3.5 - 5.2 mmol/L Final   • Chloride 02/11/2020 105  98 - 107 mmol/L Final   • Total CO2 02/11/2020 24.1  22.0 - 29.0 mmol/L Final   • Calcium 02/11/2020 8.8  8.6 - 10.5 mg/dL Final   • Total Protein 02/11/2020 6.3  6.0 - 8.5 g/dL Final   • Albumin 02/11/2020 4.20  3.50 - 5.20 g/dL Final   • Globulin 02/11/2020 2.1  gm/dL Final   • A/G Ratio 02/11/2020 2.0  g/dL Final   • Total Bilirubin 02/11/2020 0.2  0.2 - 1.2 mg/dL Final   • Alkaline Phosphatase 02/11/2020 60  39 - 117 U/L Final   • AST (SGOT) 02/11/2020 13  1 - 32 U/L Final   • ALT (SGPT) 02/11/2020 9  1 - 33 U/L Final   • WBC 02/11/2020 4.15  3.40 - 10.80 10*3/mm3 Final   • RBC 02/11/2020 4.03  3.77 - 5.28 10*6/mm3 Final   • Hemoglobin 02/11/2020 11.6* 12.0 - 15.9 g/dL Final   • Hematocrit 02/11/2020 35.0  34.0 - 46.6 % Final   • MCV 02/11/2020 86.8  79.0 - 97.0 fL Final   • MCH 02/11/2020 28.8  26.6 - 33.0 pg Final   • MCHC 02/11/2020 33.1  31.5 - 35.7 g/dL Final   • RDW 02/11/2020 12.3  12.3 - 15.4 % Final   • Platelets 02/11/2020 185  140 - 450 10*3/mm3 Final   • Neutrophil Rel % 02/11/2020 58.3  42.7 - 76.0 % Final   • Lymphocyte Rel % 02/11/2020 32.8  19.6 - 45.3 % Final   • Monocyte Rel % 02/11/2020 6.3  5.0 - 12.0 %  Final   • Eosinophil Rel % 02/11/2020 1.9  0.3 - 6.2 % Final   • Basophil Rel % 02/11/2020 0.5  0.0 - 1.5 % Final   • Neutrophils Absolute 02/11/2020 2.42  1.70 - 7.00 10*3/mm3 Final   • Lymphocytes Absolute 02/11/2020 1.36  0.70 - 3.10 10*3/mm3 Final   • Monocytes Absolute 02/11/2020 0.26  0.10 - 0.90 10*3/mm3 Final   • Eosinophils Absolute 02/11/2020 0.08  0.00 - 0.40 10*3/mm3 Final   • Basophils Absolute 02/11/2020 0.02  0.00 - 0.20 10*3/mm3 Final   • Immature Granulocyte Rel % 02/11/2020 0.2  0.0 - 0.5 % Final   • Immature Grans Absolute 02/11/2020 0.01  0.00 - 0.05 10*3/mm3 Final   • nRBC 02/11/2020 0.0  0.0 - 0.2 /100 WBC Final       Physical Exam   Constitutional: She is oriented to person, place, and time. She appears well-developed. No distress.   Pulmonary/Chest: Effort normal.   Neurological: She is alert and oriented to person, place, and time.   Skin: No pallor.   Psychiatric: She has a normal mood and affect. Her behavior is normal. Judgment and thought content normal.       Assessment/Plan     Problem List Items Addressed This Visit        Respiratory    Moderate persistent asthma without complication - Primary    Current Assessment & Plan     Asthma is Stable.  The patient is experiencing frequent daytime asthma symptoms. She is experiencing monthly nighttime asthma symptoms.  Asthma information handout given.  Discussed monitoring symptoms and use of quick-relief medications and contacting us early in the course of exacerbations.  Warning signs of respiratory distress were reviewed with the patient.   Counseled to void exposure to cigarette smoke.  It is my medical opinion the patient is at increased risk for complications if she contracts coronavirus.  I am recommending that she stay home and work from home if able for the next 6 weeks.  We will reevaluate at that time.                      Patient's There is no height or weight on file to calculate BMI.        I have discussed diagnosis in detail  today allowing time for questions and answers. Patient is aware of reasons to seek urgent or emergent medical care as well as reasons to return to the clinic for evaluation. Possible side effects, interactions and progression of symptoms discussed as well. Patient / family states understanding.   Emotional support and active listening provided.     Coronavirus precautions have been reviewed and discussed.  I have discussed the CDC recommendations  of social distancing, hand washing and disinfecting commonly touched items. Reviewed need to notify PCP and self quarantine with mild symptoms.  Discussed procedure to obtain Covid-19 testing and notification of PCP/health dept/ED/Urgent Center if symptoms begin. Understanding verbalized.    I would like to see her back in 4-6 weeks, we will do a video visit if pandemic and social distancing/state home order is still in effect.          This document has been electronically signed by:  NABEEL Hill, NP-C

## 2020-03-31 NOTE — ASSESSMENT & PLAN NOTE
Asthma is Stable.  The patient is experiencing frequent daytime asthma symptoms. She is experiencing monthly nighttime asthma symptoms.  Asthma information handout given.  Discussed monitoring symptoms and use of quick-relief medications and contacting us early in the course of exacerbations.  Warning signs of respiratory distress were reviewed with the patient.   Counseled to void exposure to cigarette smoke.  It is my medical opinion the patient is at increased risk for complications if she contracts coronavirus.  I am recommending that she stay home and work from home if able for the next 6 weeks.  We will reevaluate at that time.

## 2020-06-04 ENCOUNTER — TELEMEDICINE (OUTPATIENT)
Dept: FAMILY MEDICINE CLINIC | Facility: CLINIC | Age: 35
End: 2020-06-04

## 2020-06-04 DIAGNOSIS — J45.40 MODERATE PERSISTENT ASTHMA WITHOUT COMPLICATION: ICD-10-CM

## 2020-06-04 DIAGNOSIS — M51.36 DDD (DEGENERATIVE DISC DISEASE), LUMBAR: ICD-10-CM

## 2020-06-04 DIAGNOSIS — D50.8 IRON DEFICIENCY ANEMIA SECONDARY TO INADEQUATE DIETARY IRON INTAKE: ICD-10-CM

## 2020-06-04 DIAGNOSIS — J30.2 SEASONAL ALLERGIC RHINITIS, UNSPECIFIED TRIGGER: ICD-10-CM

## 2020-06-04 DIAGNOSIS — E55.9 VITAMIN D DEFICIENCY: ICD-10-CM

## 2020-06-04 DIAGNOSIS — G43.009 MIGRAINE WITHOUT AURA AND WITHOUT STATUS MIGRAINOSUS, NOT INTRACTABLE: Primary | ICD-10-CM

## 2020-06-04 PROCEDURE — 99214 OFFICE O/P EST MOD 30 MIN: CPT | Performed by: NURSE PRACTITIONER

## 2020-06-04 NOTE — PROGRESS NOTES
Subjective   Zhanna Veras is a 34 y.o. female.     No chief complaint on file.  CC  Asthma   Migraines     This was an audio and video enabled telemedicine encounter.  History of Present Illness:    Asthmas which is fairly well controlled with current medication. She currently receieves albuterol, zyrtec, flonase and  singulair.  Has seasonal allergies which she has frequent exacerbation when exposed to known triggers.     Depression with anxiety - having some dreams of her fathers . At home with 4 children due to covid 19 pandemic. Feels stressed. Taking lexapro 10 mg daily which helped tremendously when first went on. Denies any side effects or changes in thought process.     Iron def - taking supplement.     Vit d def- tries to remember to take supplement.     Migraines - got worse with pandemic. Less often than daily. Relieved with previous sample of umbrelvy.       The following portions of the patient's history and ROS were reviewed and updated as appropriate per provider:  Allergies, current medications, past family history, past medical history, past social history, past surgical history and problem list.    Review of Systems   Constitutional: Negative for activity change, appetite change, chills, fatigue and fever.   HENT: Positive for rhinorrhea and sneezing. Negative for congestion, ear discharge, mouth sores and sore throat.    Eyes: Negative.    Respiratory: Negative for cough, choking, chest tightness, shortness of breath and wheezing.    Cardiovascular: Negative for chest pain and palpitations.   Gastrointestinal: Negative for abdominal pain, constipation, nausea and vomiting.   Endocrine: Negative.    Genitourinary: Positive for dysuria. Negative for difficulty urinating and flank pain.   Musculoskeletal: Positive for back pain. Negative for myalgias and neck stiffness.   Skin: Negative.    Allergic/Immunologic: Positive for environmental allergies. Negative for food allergies and  immunocompromised state.   Neurological: Positive for headaches.   Hematological: Negative.    Psychiatric/Behavioral: Positive for sleep disturbance. Negative for behavioral problems, dysphoric mood, self-injury and suicidal ideas. The patient is nervous/anxious.        Objective     There were no vitals taken for this visit.  Lab on 02/11/2020   Component Date Value Ref Range Status   • Total Cholesterol 02/11/2020 149  0 - 200 mg/dL Final   • Triglycerides 02/11/2020 47  0 - 150 mg/dL Final   • HDL Cholesterol 02/11/2020 44  40 - 60 mg/dL Final   • VLDL Cholesterol 02/11/2020 9.4  mg/dL Final   • LDL Cholesterol  02/11/2020 96  0 - 100 mg/dL Final   • Vitamin B-12 02/11/2020 456  211 - 946 pg/mL Final   • 25 Hydroxy, Vitamin D 02/11/2020 23.9* 30.0 - 100.0 ng/ml Final   • Hemoglobin A1C 02/11/2020 5.10  4.80 - 5.60 % Final   • TSH 02/11/2020 2.680  0.270 - 4.200 uIU/mL Final   • Glucose 02/11/2020 91  65 - 99 mg/dL Final   • BUN 02/11/2020 20  6 - 20 mg/dL Final   • Creatinine 02/11/2020 0.59  0.57 - 1.00 mg/dL Final   • eGFR Non  Am 02/11/2020 117  >60 mL/min/1.73 Final   • eGFR African Am 02/11/2020 141  >60 mL/min/1.73 Final   • BUN/Creatinine Ratio 02/11/2020 33.9* 7.0 - 25.0 Final   • Sodium 02/11/2020 139  136 - 145 mmol/L Final   • Potassium 02/11/2020 4.3  3.5 - 5.2 mmol/L Final   • Chloride 02/11/2020 105  98 - 107 mmol/L Final   • Total CO2 02/11/2020 24.1  22.0 - 29.0 mmol/L Final   • Calcium 02/11/2020 8.8  8.6 - 10.5 mg/dL Final   • Total Protein 02/11/2020 6.3  6.0 - 8.5 g/dL Final   • Albumin 02/11/2020 4.20  3.50 - 5.20 g/dL Final   • Globulin 02/11/2020 2.1  gm/dL Final   • A/G Ratio 02/11/2020 2.0  g/dL Final   • Total Bilirubin 02/11/2020 0.2  0.2 - 1.2 mg/dL Final   • Alkaline Phosphatase 02/11/2020 60  39 - 117 U/L Final   • AST (SGOT) 02/11/2020 13  1 - 32 U/L Final   • ALT (SGPT) 02/11/2020 9  1 - 33 U/L Final   • WBC 02/11/2020 4.15  3.40 - 10.80 10*3/mm3 Final   • RBC 02/11/2020  4.03  3.77 - 5.28 10*6/mm3 Final   • Hemoglobin 02/11/2020 11.6* 12.0 - 15.9 g/dL Final   • Hematocrit 02/11/2020 35.0  34.0 - 46.6 % Final   • MCV 02/11/2020 86.8  79.0 - 97.0 fL Final   • MCH 02/11/2020 28.8  26.6 - 33.0 pg Final   • MCHC 02/11/2020 33.1  31.5 - 35.7 g/dL Final   • RDW 02/11/2020 12.3  12.3 - 15.4 % Final   • Platelets 02/11/2020 185  140 - 450 10*3/mm3 Final   • Neutrophil Rel % 02/11/2020 58.3  42.7 - 76.0 % Final   • Lymphocyte Rel % 02/11/2020 32.8  19.6 - 45.3 % Final   • Monocyte Rel % 02/11/2020 6.3  5.0 - 12.0 % Final   • Eosinophil Rel % 02/11/2020 1.9  0.3 - 6.2 % Final   • Basophil Rel % 02/11/2020 0.5  0.0 - 1.5 % Final   • Neutrophils Absolute 02/11/2020 2.42  1.70 - 7.00 10*3/mm3 Final   • Lymphocytes Absolute 02/11/2020 1.36  0.70 - 3.10 10*3/mm3 Final   • Monocytes Absolute 02/11/2020 0.26  0.10 - 0.90 10*3/mm3 Final   • Eosinophils Absolute 02/11/2020 0.08  0.00 - 0.40 10*3/mm3 Final   • Basophils Absolute 02/11/2020 0.02  0.00 - 0.20 10*3/mm3 Final   • Immature Granulocyte Rel % 02/11/2020 0.2  0.0 - 0.5 % Final   • Immature Grans Absolute 02/11/2020 0.01  0.00 - 0.05 10*3/mm3 Final   • nRBC 02/11/2020 0.0  0.0 - 0.2 /100 WBC Final       Physical Exam   Constitutional: She is oriented to person, place, and time. She appears well-developed and well-nourished. She is cooperative.  Non-toxic appearance. She does not have a sickly appearance. She does not appear ill. No distress.   HENT:   Right Ear: Hearing and external ear normal.   Left Ear: Hearing and external ear normal.   Nose: Nose normal.   External nose normal    Eyes: Pupils are equal, round, and reactive to light. Conjunctivae and lids are normal.   Pulmonary/Chest: Effort normal. No accessory muscle usage. No respiratory distress.   Abdominal: Normal appearance.   Musculoskeletal:   Moving around her home freely    Neurological: She is alert and oriented to person, place, and time. She is not disoriented.   Skin: No rash  noted. She is not diaphoretic. No cyanosis. No pallor. Nails show no clubbing.   Psychiatric: She has a normal mood and affect. Her speech is normal and behavior is normal. Judgment and thought content normal. Cognition and memory are normal.       Assessment/Plan     Problem List Items Addressed This Visit        Cardiovascular and Mediastinum    Migraine without aura and without status migrainosus, not intractable - Primary    Current Assessment & Plan     Headaches are improving with treatment.  Advised to keep a headache diary.             Relevant Orders    CBC & Differential    Comprehensive Metabolic Panel    TSH    Hemoglobin A1c    Vitamin D 25 Hydroxy    Vitamin B12    Lipid Panel    Uric Acid    CBC & Differential    Comprehensive Metabolic Panel    TSH    Vitamin D 25 Hydroxy    Vitamin B12    Lipid Panel    Iron and TIBC       Respiratory    Moderate persistent asthma without complication    Current Assessment & Plan     Asthma is stable.  Asthma precautions discussed  Continue current medications              Relevant Orders    CBC & Differential    Comprehensive Metabolic Panel    TSH    Hemoglobin A1c    Vitamin D 25 Hydroxy    Vitamin B12    Lipid Panel    Uric Acid    CBC & Differential    Comprehensive Metabolic Panel    TSH    Vitamin D 25 Hydroxy    Vitamin B12    Lipid Panel    Iron and TIBC    Allergic rhinitis       Digestive    Vitamin D deficiency    Current Assessment & Plan     Pt will be compliant with supplement         Relevant Orders    Vitamin D 25 Hydroxy    CBC & Differential    Comprehensive Metabolic Panel    TSH    Vitamin D 25 Hydroxy    Vitamin B12    Lipid Panel    Iron and TIBC       Musculoskeletal and Integument    DDD (degenerative disc disease), lumbar    Relevant Orders    CBC & Differential    Comprehensive Metabolic Panel    CBC & Differential    Comprehensive Metabolic Panel    TSH    Vitamin D 25 Hydroxy    Vitamin B12    Lipid Panel    Iron and TIBC        Hematopoietic and Hemostatic    Iron deficiency anemia secondary to inadequate dietary iron intake    Relevant Orders    CBC & Differential    Iron and TIBC         continue current medications. Will order routine and screening labs. She has not had an iron panel lately and will need to have one collected. .discussed medication list and possible side effects and well as risk vs benefit of medications.     I have discussed diagnosis in detail today allowing time for questions and answers. Patient is aware of reasons to seek urgent or emergent medical care as well as reasons to return to the clinic for evaluation. Possible side effects, interactions and progression of symptoms discussed as well. Patient / family states understanding.   Emotional support and active listening provided.     Medication adjustment of lexapro will be evaluated in 2-4 weeks, sooner if needed. Discussed side effects to report. Understanding stated.      Pt seen via audio and visual assisted visit at her request due to pandemic. Virus prevention and precautions discussed.           This document has been electronically signed by:  NABEEL Hill, NP-C

## 2020-06-16 ENCOUNTER — CLINICAL SUPPORT (OUTPATIENT)
Dept: FAMILY MEDICINE CLINIC | Facility: CLINIC | Age: 35
End: 2020-06-16

## 2020-06-16 DIAGNOSIS — Z02.0 SCHOOL HEALTH EXAMINATION: Primary | ICD-10-CM

## 2020-06-16 PROCEDURE — 86580 TB INTRADERMAL TEST: CPT | Performed by: NURSE PRACTITIONER

## 2020-07-09 ENCOUNTER — TELEMEDICINE (OUTPATIENT)
Dept: FAMILY MEDICINE CLINIC | Facility: CLINIC | Age: 35
End: 2020-07-09

## 2020-07-09 DIAGNOSIS — J45.40 MODERATE PERSISTENT ASTHMA WITHOUT COMPLICATION: ICD-10-CM

## 2020-07-09 DIAGNOSIS — J30.89 OTHER ALLERGIC RHINITIS: ICD-10-CM

## 2020-07-09 DIAGNOSIS — F41.1 GENERALIZED ANXIETY DISORDER: Primary | ICD-10-CM

## 2020-07-09 DIAGNOSIS — Z91.09 MULTIPLE ENVIRONMENTAL ALLERGIES: ICD-10-CM

## 2020-07-09 PROCEDURE — 99214 OFFICE O/P EST MOD 30 MIN: CPT | Performed by: NURSE PRACTITIONER

## 2020-07-09 RX ORDER — MONTELUKAST SODIUM 10 MG/1
10 TABLET ORAL NIGHTLY
Qty: 30 TABLET | Refills: 5 | Status: SHIPPED | OUTPATIENT
Start: 2020-07-09 | End: 2020-08-20 | Stop reason: SDUPTHER

## 2020-07-09 RX ORDER — FLUTICASONE PROPIONATE 50 MCG
SPRAY, SUSPENSION (ML) NASAL
Qty: 1 BOTTLE | Refills: 5 | Status: SHIPPED | OUTPATIENT
Start: 2020-07-09 | End: 2020-08-20 | Stop reason: SDUPTHER

## 2020-07-09 RX ORDER — ESCITALOPRAM OXALATE 20 MG/1
20 TABLET ORAL DAILY
Qty: 30 TABLET | Refills: 5 | Status: SHIPPED | OUTPATIENT
Start: 2020-07-09 | End: 2020-08-20 | Stop reason: SDUPTHER

## 2020-07-09 RX ORDER — CETIRIZINE HYDROCHLORIDE 10 MG/1
10 TABLET ORAL NIGHTLY
Qty: 30 TABLET | Refills: 5 | Status: SHIPPED | OUTPATIENT
Start: 2020-07-09 | End: 2020-08-20 | Stop reason: SDUPTHER

## 2020-07-09 RX ORDER — ERGOCALCIFEROL 1.25 MG/1
50000 CAPSULE ORAL WEEKLY
Qty: 5 CAPSULE | Refills: 5 | Status: SHIPPED | OUTPATIENT
Start: 2020-07-09 | End: 2020-08-20 | Stop reason: SDUPTHER

## 2020-07-09 NOTE — ASSESSMENT & PLAN NOTE
Avoid known allergens.  We will be very cautious to avoid exposure to COVID-19 due to high risk for complications.

## 2020-07-09 NOTE — PROGRESS NOTES
Subjective   Zhanna Veras is a 34 y.o. female.     No chief complaint on file.  Chief complaint  Asthma and anxiety    This was an audio and video enabled telemedicine encounter.  History of Present Illness:      Asthma - doing well except when she is in public. Feels as if she smothering when she wears a mask.  Currently receives albuterol, Zyrtec, Singulair and Flonase.    Going back to work August 3rd for training and is nervous about wearing a mask and being exposed to the public.       Anxiety with depression - taking Lexapro 10 mg daily. Having some anxiety and difficulty not stating her thoughts. Reports she usually has some filter on expressing herself.  Patient has been staying home with her 4 children which has placed additional stress on her.  She is agreeable to increasing her Lexapro.  We had discussed this last time and somehow there was pharmacy miscommunication and she did not receive her new prescription for Lexapro 20 mg daily.  She does deny any thoughts of hurting self or others.    Patient has chronic neck and low back pain.  Her  is in med school studying to be a DO.  He has been working out some of his osteopathic maneuvers on her which has helped her neck and back pain tremendously.      The following portions of the patient's history and ROS were reviewed and updated as appropriate per provider:  Allergies, current medications, past family history, past medical history, past social history, past surgical history and problem list.    Review of Systems   Constitutional: Positive for activity change and appetite change. Negative for chills and fever.   HENT: Negative for congestion, sinus pressure, sinus pain and sore throat.    Eyes: Negative.    Respiratory: Negative for chest tightness, shortness of breath and wheezing.    Cardiovascular: Negative for chest pain and palpitations.   Gastrointestinal: Negative for abdominal pain, diarrhea and vomiting.   Endocrine: Negative.       Genitourinary: Negative for dysuria, flank pain and hematuria.   Musculoskeletal: Positive for back pain and neck pain. Negative for neck stiffness.   Skin: Negative.    Allergic/Immunologic: Positive for environmental allergies. Negative for food allergies and immunocompromised state.   Neurological: Positive for headaches. Negative for dizziness, light-headedness and numbness.   Hematological: Negative.    Psychiatric/Behavioral: Negative for behavioral problems, dysphoric mood, self-injury and suicidal ideas. The patient is nervous/anxious.        Objective     There were no vitals taken for this visit.  Lab on 02/11/2020   Component Date Value Ref Range Status   • Total Cholesterol 02/11/2020 149  0 - 200 mg/dL Final   • Triglycerides 02/11/2020 47  0 - 150 mg/dL Final   • HDL Cholesterol 02/11/2020 44  40 - 60 mg/dL Final   • VLDL Cholesterol 02/11/2020 9.4  mg/dL Final   • LDL Cholesterol  02/11/2020 96  0 - 100 mg/dL Final   • Vitamin B-12 02/11/2020 456  211 - 946 pg/mL Final   • 25 Hydroxy, Vitamin D 02/11/2020 23.9* 30.0 - 100.0 ng/ml Final   • Hemoglobin A1C 02/11/2020 5.10  4.80 - 5.60 % Final   • TSH 02/11/2020 2.680  0.270 - 4.200 uIU/mL Final   • Glucose 02/11/2020 91  65 - 99 mg/dL Final   • BUN 02/11/2020 20  6 - 20 mg/dL Final   • Creatinine 02/11/2020 0.59  0.57 - 1.00 mg/dL Final   • eGFR Non  Am 02/11/2020 117  >60 mL/min/1.73 Final   • eGFR African Am 02/11/2020 141  >60 mL/min/1.73 Final   • BUN/Creatinine Ratio 02/11/2020 33.9* 7.0 - 25.0 Final   • Sodium 02/11/2020 139  136 - 145 mmol/L Final   • Potassium 02/11/2020 4.3  3.5 - 5.2 mmol/L Final   • Chloride 02/11/2020 105  98 - 107 mmol/L Final   • Total CO2 02/11/2020 24.1  22.0 - 29.0 mmol/L Final   • Calcium 02/11/2020 8.8  8.6 - 10.5 mg/dL Final   • Total Protein 02/11/2020 6.3  6.0 - 8.5 g/dL Final   • Albumin 02/11/2020 4.20  3.50 - 5.20 g/dL Final   • Globulin 02/11/2020 2.1  gm/dL Final   • A/G Ratio 02/11/2020 2.0  g/dL  Final   • Total Bilirubin 02/11/2020 0.2  0.2 - 1.2 mg/dL Final   • Alkaline Phosphatase 02/11/2020 60  39 - 117 U/L Final   • AST (SGOT) 02/11/2020 13  1 - 32 U/L Final   • ALT (SGPT) 02/11/2020 9  1 - 33 U/L Final   • WBC 02/11/2020 4.15  3.40 - 10.80 10*3/mm3 Final   • RBC 02/11/2020 4.03  3.77 - 5.28 10*6/mm3 Final   • Hemoglobin 02/11/2020 11.6* 12.0 - 15.9 g/dL Final   • Hematocrit 02/11/2020 35.0  34.0 - 46.6 % Final   • MCV 02/11/2020 86.8  79.0 - 97.0 fL Final   • MCH 02/11/2020 28.8  26.6 - 33.0 pg Final   • MCHC 02/11/2020 33.1  31.5 - 35.7 g/dL Final   • RDW 02/11/2020 12.3  12.3 - 15.4 % Final   • Platelets 02/11/2020 185  140 - 450 10*3/mm3 Final   • Neutrophil Rel % 02/11/2020 58.3  42.7 - 76.0 % Final   • Lymphocyte Rel % 02/11/2020 32.8  19.6 - 45.3 % Final   • Monocyte Rel % 02/11/2020 6.3  5.0 - 12.0 % Final   • Eosinophil Rel % 02/11/2020 1.9  0.3 - 6.2 % Final   • Basophil Rel % 02/11/2020 0.5  0.0 - 1.5 % Final   • Neutrophils Absolute 02/11/2020 2.42  1.70 - 7.00 10*3/mm3 Final   • Lymphocytes Absolute 02/11/2020 1.36  0.70 - 3.10 10*3/mm3 Final   • Monocytes Absolute 02/11/2020 0.26  0.10 - 0.90 10*3/mm3 Final   • Eosinophils Absolute 02/11/2020 0.08  0.00 - 0.40 10*3/mm3 Final   • Basophils Absolute 02/11/2020 0.02  0.00 - 0.20 10*3/mm3 Final   • Immature Granulocyte Rel % 02/11/2020 0.2  0.0 - 0.5 % Final   • Immature Grans Absolute 02/11/2020 0.01  0.00 - 0.05 10*3/mm3 Final   • nRBC 02/11/2020 0.0  0.0 - 0.2 /100 WBC Final       Physical Exam   Constitutional: She is oriented to person, place, and time. She appears well-developed and well-nourished. She is cooperative.  Non-toxic appearance. She does not have a sickly appearance. She does not appear ill. No distress.   HENT:   Head: Hair is normal.   Right Ear: External ear normal. No decreased hearing is noted.   Left Ear: External ear normal. No decreased hearing is noted.   External nose normal.  Lips normal.   Eyes: Conjunctivae and  lids are normal. Right eye exhibits no discharge. Left eye exhibits no discharge.   Neck: Normal range of motion.   Cardiovascular:   No cyanosis   Pulmonary/Chest: Effort normal. No accessory muscle usage. No respiratory distress.   Abdominal: Normal appearance.   Musculoskeletal: Normal range of motion.   Patient is freely moving around in her home   Neurological: She is alert and oriented to person, place, and time. She is not disoriented.   Skin: She is not diaphoretic. No cyanosis or erythema. No pallor. Nails show no clubbing.   Psychiatric: She has a normal mood and affect. Her speech is normal and behavior is normal. Judgment and thought content normal. Cognition and memory are normal. She is attentive.       Assessment/Plan     Problem List Items Addressed This Visit        Respiratory    Moderate persistent asthma without complication    Current Assessment & Plan     Avoid known allergens.  We will be very cautious to avoid exposure to COVID-19 due to high risk for complications.             Relevant Medications    cetirizine (zyrTEC) 10 MG tablet    montelukast (SINGULAIR) 10 MG tablet       Other    Generalized anxiety disorder - Primary    Overview     With depression         Current Assessment & Plan     Psychological condition is unchanged.  Regular aerobic exercise.  Medication changes per orders.  Emotional support provided.  Use of distraction to help with daily stressors.  Psychological condition  will be reassessed 6 weeks.         Relevant Medications    escitalopram (LEXAPRO) 20 MG tablet    Multiple environmental allergies    Overview     Grass, roaches, trees and cats          Current Assessment & Plan     Continue Flonase, Singulair, Zyrtec.  Keep EpiPen available.  Avoid known allergens         Relevant Medications    fluticasone (Flonase) 50 MCG/ACT nasal spray      Other Visit Diagnoses     Other allergic rhinitis        refill routine medications  allergy precautions      Relevant  Medications    cetirizine (zyrTEC) 10 MG tablet    montelukast (SINGULAIR) 10 MG tablet          Current Outpatient Medications:   •  albuterol sulfate  (90 Base) MCG/ACT inhaler, Inhale 2 puffs Every 4 (Four) Hours As Needed for Shortness of Air., Disp: 1 inhaler, Rfl: 5  •  cetirizine (zyrTEC) 10 MG tablet, Take 1 tablet by mouth Every Night., Disp: 30 tablet, Rfl: 5  •  EPINEPHrine (EPIPEN IJ), Inject  as directed., Disp: , Rfl:   •  escitalopram (LEXAPRO) 20 MG tablet, Take 1 tablet by mouth Daily., Disp: 30 tablet, Rfl: 5  •  fluticasone (Flonase) 50 MCG/ACT nasal spray, Administer 2 sprays both nostrils once daily, Disp: 1 bottle, Rfl: 5  •  montelukast (SINGULAIR) 10 MG tablet, Take 1 tablet by mouth Every Night., Disp: 30 tablet, Rfl: 5  •  Pediatric Multivitamins-Iron (FLINTSTONES PLUS IRON) chewable tablet, Chew 2 tablets Daily., Disp: 90 tablet, Rfl: 3  •  vitamin D (ERGOCALCIFEROL) 1.25 MG (07711 UT) capsule capsule, Take 1 capsule by mouth 1 (One) Time Per Week., Disp: 5 capsule, Rfl: 5    Medication list reviewed and discussed.  Medication adjustments performed.    Coronavirus precautions have been reviewed and discussed.  I have discussed the CDC recommendations  of social distancing, hand washing and disinfecting commonly touched items. Reviewed need to notify PCP and self quarantine with mild symptoms.  Discussed procedure to obtain Covid-19 testing and notification of PCP/health dept/ED/Urgent Center if symptoms begin. Understanding verbalized.       I have discussed diagnosis in detail today allowing time for questions and answers. Patient is aware of reasons to seek urgent or emergent medical care as well as reasons to return to the clinic for evaluation. Possible side effects, interactions and progression of symptoms discussed as well. Patient / family states understanding.   Emotional support and active listening provided.     She does have lab order already in epic.  I would like for  her to have fasting labs 1 week prior to her next appointment.  I will see her back in 6 weeks, sooner if needed.  We are going to increase her Lexapro to 20 mg daily.  If she notices any side effects or worsening in her anxiety/depression she is to call and notify provider for medication adjustments.    Visit was performed using my chart with audio and visual.          This document has been electronically signed by:  NABEEL Hill, NP-C

## 2020-07-09 NOTE — ASSESSMENT & PLAN NOTE
Psychological condition is unchanged.  Regular aerobic exercise.  Medication changes per orders.  Emotional support provided.  Use of distraction to help with daily stressors.  Psychological condition  will be reassessed 6 weeks.

## 2020-08-17 ENCOUNTER — LAB (OUTPATIENT)
Dept: FAMILY MEDICINE CLINIC | Facility: CLINIC | Age: 35
End: 2020-08-17

## 2020-08-17 DIAGNOSIS — J45.40 MODERATE PERSISTENT ASTHMA WITHOUT COMPLICATION: ICD-10-CM

## 2020-08-17 DIAGNOSIS — G43.009 MIGRAINE WITHOUT AURA AND WITHOUT STATUS MIGRAINOSUS, NOT INTRACTABLE: ICD-10-CM

## 2020-08-17 DIAGNOSIS — E55.9 VITAMIN D DEFICIENCY: ICD-10-CM

## 2020-08-17 DIAGNOSIS — M51.36 DDD (DEGENERATIVE DISC DISEASE), LUMBAR: ICD-10-CM

## 2020-08-17 DIAGNOSIS — D50.8 IRON DEFICIENCY ANEMIA SECONDARY TO INADEQUATE DIETARY IRON INTAKE: ICD-10-CM

## 2020-08-17 LAB
25(OH)D3 SERPL-MCNC: 32 NG/ML (ref 30–100)
ALBUMIN SERPL-MCNC: 4.2 G/DL (ref 3.5–5.2)
ALBUMIN/GLOB SERPL: 1.6 G/DL
ALP SERPL-CCNC: 55 U/L (ref 39–117)
ALT SERPL W P-5'-P-CCNC: 11 U/L (ref 1–33)
ANION GAP SERPL CALCULATED.3IONS-SCNC: 9.4 MMOL/L (ref 5–15)
AST SERPL-CCNC: 10 U/L (ref 1–32)
BASOPHILS # BLD AUTO: 0.04 10*3/MM3 (ref 0–0.2)
BASOPHILS NFR BLD AUTO: 0.7 % (ref 0–1.5)
BILIRUB SERPL-MCNC: 0.4 MG/DL (ref 0–1.2)
BUN SERPL-MCNC: 12 MG/DL (ref 6–20)
BUN/CREAT SERPL: 21.1 (ref 7–25)
CALCIUM SPEC-SCNC: 9.1 MG/DL (ref 8.6–10.5)
CHLORIDE SERPL-SCNC: 106 MMOL/L (ref 98–107)
CHOLEST SERPL-MCNC: 157 MG/DL (ref 0–200)
CO2 SERPL-SCNC: 22.6 MMOL/L (ref 22–29)
CREAT SERPL-MCNC: 0.57 MG/DL (ref 0.57–1)
DEPRECATED RDW RBC AUTO: 42.1 FL (ref 37–54)
EOSINOPHIL # BLD AUTO: 0.13 10*3/MM3 (ref 0–0.4)
EOSINOPHIL NFR BLD AUTO: 2.3 % (ref 0.3–6.2)
ERYTHROCYTE [DISTWIDTH] IN BLOOD BY AUTOMATED COUNT: 12.9 % (ref 12.3–15.4)
GFR SERPL CREATININE-BSD FRML MDRD: 121 ML/MIN/1.73
GLOBULIN UR ELPH-MCNC: 2.7 GM/DL
GLUCOSE SERPL-MCNC: 87 MG/DL (ref 65–99)
HBA1C MFR BLD: 4.76 % (ref 4.8–5.6)
HCT VFR BLD AUTO: 37.1 % (ref 34–46.6)
HDLC SERPL-MCNC: 40 MG/DL (ref 40–60)
HGB BLD-MCNC: 12.2 G/DL (ref 12–15.9)
IMM GRANULOCYTES # BLD AUTO: 0.02 10*3/MM3 (ref 0–0.05)
IMM GRANULOCYTES NFR BLD AUTO: 0.4 % (ref 0–0.5)
IRON 24H UR-MRATE: 51 MCG/DL (ref 37–145)
IRON SATN MFR SERPL: 14 % (ref 20–50)
LDLC SERPL CALC-MCNC: 106 MG/DL (ref 0–100)
LDLC/HDLC SERPL: 2.65 {RATIO}
LYMPHOCYTES # BLD AUTO: 1.99 10*3/MM3 (ref 0.7–3.1)
LYMPHOCYTES NFR BLD AUTO: 35.4 % (ref 19.6–45.3)
MCH RBC QN AUTO: 29 PG (ref 26.6–33)
MCHC RBC AUTO-ENTMCNC: 32.9 G/DL (ref 31.5–35.7)
MCV RBC AUTO: 88.3 FL (ref 79–97)
MONOCYTES # BLD AUTO: 0.34 10*3/MM3 (ref 0.1–0.9)
MONOCYTES NFR BLD AUTO: 6 % (ref 5–12)
NEUTROPHILS NFR BLD AUTO: 3.1 10*3/MM3 (ref 1.7–7)
NEUTROPHILS NFR BLD AUTO: 55.2 % (ref 42.7–76)
NRBC BLD AUTO-RTO: 0 /100 WBC (ref 0–0.2)
PLATELET # BLD AUTO: 204 10*3/MM3 (ref 140–450)
PMV BLD AUTO: 10.9 FL (ref 6–12)
POTASSIUM SERPL-SCNC: 3.9 MMOL/L (ref 3.5–5.2)
PROT SERPL-MCNC: 6.9 G/DL (ref 6–8.5)
RBC # BLD AUTO: 4.2 10*6/MM3 (ref 3.77–5.28)
SODIUM SERPL-SCNC: 138 MMOL/L (ref 136–145)
TIBC SERPL-MCNC: 367 MCG/DL (ref 298–536)
TRANSFERRIN SERPL-MCNC: 246 MG/DL (ref 200–360)
TRIGL SERPL-MCNC: 55 MG/DL (ref 0–150)
TSH SERPL DL<=0.05 MIU/L-ACNC: 2.02 UIU/ML (ref 0.27–4.2)
URATE SERPL-MCNC: 3.3 MG/DL (ref 2.4–5.7)
VIT B12 BLD-MCNC: 445 PG/ML (ref 211–946)
VLDLC SERPL-MCNC: 11 MG/DL (ref 5–40)
WBC # BLD AUTO: 5.62 10*3/MM3 (ref 3.4–10.8)

## 2020-08-17 PROCEDURE — 85025 COMPLETE CBC W/AUTO DIFF WBC: CPT | Performed by: NURSE PRACTITIONER

## 2020-08-17 PROCEDURE — 83540 ASSAY OF IRON: CPT | Performed by: NURSE PRACTITIONER

## 2020-08-17 PROCEDURE — 84550 ASSAY OF BLOOD/URIC ACID: CPT | Performed by: NURSE PRACTITIONER

## 2020-08-17 PROCEDURE — 80061 LIPID PANEL: CPT | Performed by: NURSE PRACTITIONER

## 2020-08-17 PROCEDURE — 84466 ASSAY OF TRANSFERRIN: CPT | Performed by: NURSE PRACTITIONER

## 2020-08-17 PROCEDURE — 83036 HEMOGLOBIN GLYCOSYLATED A1C: CPT | Performed by: NURSE PRACTITIONER

## 2020-08-17 PROCEDURE — 80053 COMPREHEN METABOLIC PANEL: CPT | Performed by: NURSE PRACTITIONER

## 2020-08-17 PROCEDURE — 82306 VITAMIN D 25 HYDROXY: CPT | Performed by: NURSE PRACTITIONER

## 2020-08-17 PROCEDURE — 82607 VITAMIN B-12: CPT | Performed by: NURSE PRACTITIONER

## 2020-08-17 PROCEDURE — 84443 ASSAY THYROID STIM HORMONE: CPT | Performed by: NURSE PRACTITIONER

## 2020-08-20 ENCOUNTER — TELEMEDICINE (OUTPATIENT)
Dept: FAMILY MEDICINE CLINIC | Facility: CLINIC | Age: 35
End: 2020-08-20

## 2020-08-20 DIAGNOSIS — D50.8 IRON DEFICIENCY ANEMIA SECONDARY TO INADEQUATE DIETARY IRON INTAKE: ICD-10-CM

## 2020-08-20 DIAGNOSIS — J30.89 OTHER ALLERGIC RHINITIS: ICD-10-CM

## 2020-08-20 DIAGNOSIS — J45.40 MODERATE PERSISTENT ASTHMA WITHOUT COMPLICATION: Primary | ICD-10-CM

## 2020-08-20 DIAGNOSIS — Z91.09 MULTIPLE ENVIRONMENTAL ALLERGIES: ICD-10-CM

## 2020-08-20 PROCEDURE — 99214 OFFICE O/P EST MOD 30 MIN: CPT | Performed by: NURSE PRACTITIONER

## 2020-08-20 RX ORDER — ALBUTEROL SULFATE 90 UG/1
2 AEROSOL, METERED RESPIRATORY (INHALATION) EVERY 4 HOURS PRN
Qty: 18 G | Refills: 5 | Status: SHIPPED | OUTPATIENT
Start: 2020-08-20 | End: 2021-12-09 | Stop reason: SDUPTHER

## 2020-08-20 RX ORDER — FLUTICASONE PROPIONATE 50 MCG
SPRAY, SUSPENSION (ML) NASAL
Qty: 1 BOTTLE | Refills: 5 | Status: SHIPPED | OUTPATIENT
Start: 2020-08-20 | End: 2021-12-09

## 2020-08-20 RX ORDER — ERGOCALCIFEROL 1.25 MG/1
50000 CAPSULE ORAL WEEKLY
Qty: 5 CAPSULE | Refills: 5 | Status: SHIPPED | OUTPATIENT
Start: 2020-08-20 | End: 2021-12-09

## 2020-08-20 RX ORDER — CETIRIZINE HYDROCHLORIDE 10 MG/1
10 TABLET ORAL NIGHTLY
Qty: 30 TABLET | Refills: 5 | Status: SHIPPED | OUTPATIENT
Start: 2020-08-20 | End: 2021-10-11

## 2020-08-20 RX ORDER — MONTELUKAST SODIUM 10 MG/1
10 TABLET ORAL NIGHTLY
Qty: 30 TABLET | Refills: 5 | Status: SHIPPED | OUTPATIENT
Start: 2020-08-20 | End: 2021-07-20 | Stop reason: SDUPTHER

## 2020-08-20 RX ORDER — ESCITALOPRAM OXALATE 20 MG/1
20 TABLET ORAL DAILY
Qty: 30 TABLET | Refills: 5 | Status: SHIPPED | OUTPATIENT
Start: 2020-08-20 | End: 2021-07-20 | Stop reason: SDUPTHER

## 2020-08-20 RX ORDER — EPINEPHRINE 0.3 MG/.3ML
0.3 INJECTION SUBCUTANEOUS ONCE
Qty: 1 EACH | Refills: 5 | Status: SHIPPED | OUTPATIENT
Start: 2020-08-20 | End: 2020-08-20

## 2020-08-20 NOTE — ASSESSMENT & PLAN NOTE
Carry albuterol inhaler.  Continue Zyrtec, Flonase and Singulair.  Avoid known triggers.  Asthma precautions reviewed and discussed.

## 2020-08-20 NOTE — ASSESSMENT & PLAN NOTE
Continue Flintstones/pediatric multivitamin with iron 2 tablets daily.  Repeat labs in 3-6 months.

## 2020-08-20 NOTE — PROGRESS NOTES
Subjective   Zhanna Veras is a 34 y.o. female.     No chief complaint on file.    Cc  Asthma  Anxiety  Allergic rhinitis    This was an audio and video enabled telemedicine encounter.    History of Present Illness:    Asthma-patient currently receives Zyrtec, albuterol, Flonase and Singulair.  No recent asthma attacks.  She usually has several exacerbations throughout the year.  She does need a refill on EpiPen.    Allergic rhinitis-multiple environmental allergens.  Patient needs a refill on her EpiPen.  Some mild runny nose but no other exacerbation.    Vitamin D deficiency/iron deficiency-patient is taking a Flintstones multivitamin with iron which has been very helpful with her energy level.  She does not tolerate iron supplements due to stomach irritation and constipation.  She is tolerating this vitamin well.    She has returned to work after being quarantined at home for several months.  She is wearing a mask and trying to social distance as available through her work duties.      The following portions of the patient's history and ROS were reviewed and updated as appropriate per provider:  Allergies, current medications, past family history, past medical history, past social history, past surgical history and problem list.    Review of Systems   Constitutional: Negative for activity change, appetite change and fever.   HENT: Negative for congestion, sinus pressure, sinus pain, sneezing and sore throat.    Eyes: Negative.    Respiratory: Negative for cough, chest tightness and shortness of breath.    Cardiovascular: Negative.    Gastrointestinal: Negative for abdominal pain, blood in stool, diarrhea and nausea.   Endocrine: Negative.    Genitourinary: Negative for dysuria, frequency and hematuria.   Musculoskeletal: Positive for back pain. Negative for myalgias, neck pain and neck stiffness.   Skin: Negative.    Allergic/Immunologic: Positive for environmental allergies. Negative for food allergies and  immunocompromised state.   Neurological: Negative for seizures, facial asymmetry, speech difficulty and numbness.   Hematological: Negative.    Psychiatric/Behavioral: Negative for dysphoric mood, self-injury and suicidal ideas.       Objective     There were no vitals taken for this visit.  Lab on 08/17/2020   Component Date Value Ref Range Status   • Glucose 08/17/2020 87  65 - 99 mg/dL Final   • BUN 08/17/2020 12  6 - 20 mg/dL Final   • Creatinine 08/17/2020 0.57  0.57 - 1.00 mg/dL Final   • Sodium 08/17/2020 138  136 - 145 mmol/L Final   • Potassium 08/17/2020 3.9  3.5 - 5.2 mmol/L Final   • Chloride 08/17/2020 106  98 - 107 mmol/L Final   • CO2 08/17/2020 22.6  22.0 - 29.0 mmol/L Final   • Calcium 08/17/2020 9.1  8.6 - 10.5 mg/dL Final   • Total Protein 08/17/2020 6.9  6.0 - 8.5 g/dL Final   • Albumin 08/17/2020 4.20  3.50 - 5.20 g/dL Final   • ALT (SGPT) 08/17/2020 11  1 - 33 U/L Final   • AST (SGOT) 08/17/2020 10  1 - 32 U/L Final   • Alkaline Phosphatase 08/17/2020 55  39 - 117 U/L Final   • Total Bilirubin 08/17/2020 0.4  0.0 - 1.2 mg/dL Final   • eGFR Non African Amer 08/17/2020 121  >60 mL/min/1.73 Final   • Globulin 08/17/2020 2.7  gm/dL Final   • A/G Ratio 08/17/2020 1.6  g/dL Final   • BUN/Creatinine Ratio 08/17/2020 21.1  7.0 - 25.0 Final   • Anion Gap 08/17/2020 9.4  5.0 - 15.0 mmol/L Final   • TSH 08/17/2020 2.020  0.270 - 4.200 uIU/mL Final   • Hemoglobin A1C 08/17/2020 4.76* 4.80 - 5.60 % Final   • 25 Hydroxy, Vitamin D 08/17/2020 32.0  30.0 - 100.0 ng/ml Final   • Vitamin B-12 08/17/2020 445  211 - 946 pg/mL Final   • Total Cholesterol 08/17/2020 157  0 - 200 mg/dL Final   • Triglycerides 08/17/2020 55  0 - 150 mg/dL Final   • HDL Cholesterol 08/17/2020 40  40 - 60 mg/dL Final   • LDL Cholesterol  08/17/2020 106* 0 - 100 mg/dL Final   • VLDL Cholesterol 08/17/2020 11  5 - 40 mg/dL Final   • LDL/HDL Ratio 08/17/2020 2.65   Final   • Uric Acid 08/17/2020 3.3  2.4 - 5.7 mg/dL Final   • Iron  08/17/2020 51  37 - 145 mcg/dL Final   • Iron Saturation 08/17/2020 14* 20 - 50 % Final   • Transferrin 08/17/2020 246  200 - 360 mg/dL Final   • TIBC 08/17/2020 367  298 - 536 mcg/dL Final   • WBC 08/17/2020 5.62  3.40 - 10.80 10*3/mm3 Final   • RBC 08/17/2020 4.20  3.77 - 5.28 10*6/mm3 Final   • Hemoglobin 08/17/2020 12.2  12.0 - 15.9 g/dL Final   • Hematocrit 08/17/2020 37.1  34.0 - 46.6 % Final   • MCV 08/17/2020 88.3  79.0 - 97.0 fL Final   • MCH 08/17/2020 29.0  26.6 - 33.0 pg Final   • MCHC 08/17/2020 32.9  31.5 - 35.7 g/dL Final   • RDW 08/17/2020 12.9  12.3 - 15.4 % Final   • RDW-SD 08/17/2020 42.1  37.0 - 54.0 fl Final   • MPV 08/17/2020 10.9  6.0 - 12.0 fL Final   • Platelets 08/17/2020 204  140 - 450 10*3/mm3 Final   • Neutrophil % 08/17/2020 55.2  42.7 - 76.0 % Final   • Lymphocyte % 08/17/2020 35.4  19.6 - 45.3 % Final   • Monocyte % 08/17/2020 6.0  5.0 - 12.0 % Final   • Eosinophil % 08/17/2020 2.3  0.3 - 6.2 % Final   • Basophil % 08/17/2020 0.7  0.0 - 1.5 % Final   • Immature Grans % 08/17/2020 0.4  0.0 - 0.5 % Final   • Neutrophils, Absolute 08/17/2020 3.10  1.70 - 7.00 10*3/mm3 Final   • Lymphocytes, Absolute 08/17/2020 1.99  0.70 - 3.10 10*3/mm3 Final   • Monocytes, Absolute 08/17/2020 0.34  0.10 - 0.90 10*3/mm3 Final   • Eosinophils, Absolute 08/17/2020 0.13  0.00 - 0.40 10*3/mm3 Final   • Basophils, Absolute 08/17/2020 0.04  0.00 - 0.20 10*3/mm3 Final   • Immature Grans, Absolute 08/17/2020 0.02  0.00 - 0.05 10*3/mm3 Final   • nRBC 08/17/2020 0.0  0.0 - 0.2 /100 WBC Final       Physical Exam   Constitutional: She is oriented to person, place, and time. She appears well-developed and well-nourished. She is cooperative.  Non-toxic appearance. She does not have a sickly appearance. She does not appear ill. No distress.   HENT:   Head: Hair is normal.   Right Ear: Hearing and external ear normal.   Left Ear: Hearing and external ear normal.   Mouth/Throat: Mucous membranes are not cyanotic.      External nose and lips are normal, no cyanosis   Eyes: Pupils are equal, round, and reactive to light. Conjunctivae and lids are normal.   Neck: Normal range of motion.   Pulmonary/Chest: Effort normal. No accessory muscle usage. No respiratory distress.   Abdominal: Normal appearance.   Musculoskeletal: Normal range of motion.   Neurological: She is alert and oriented to person, place, and time. She is not disoriented.   Skin: She is not diaphoretic. No cyanosis. No pallor. Nails show no clubbing.   Psychiatric: She has a normal mood and affect. Her speech is normal and behavior is normal. Judgment and thought content normal. Her affect is not inappropriate. Cognition and memory are normal. She is attentive.       Assessment/Plan     Problem List Items Addressed This Visit        Respiratory    Moderate persistent asthma without complication - Primary    Current Assessment & Plan     Carry albuterol inhaler.  Continue Zyrtec, Flonase and Singulair.  Avoid known triggers.  Asthma precautions reviewed and discussed.             Relevant Medications    albuterol sulfate  (90 Base) MCG/ACT inhaler    cetirizine (zyrTEC) 10 MG tablet    montelukast (SINGULAIR) 10 MG tablet    Other Relevant Orders    CBC & Differential    Comprehensive Metabolic Panel    TSH    Hemoglobin A1c    Vitamin D 25 Hydroxy    Vitamin B12    Lipid Panel       Hematopoietic and Hemostatic    Iron deficiency anemia secondary to inadequate dietary iron intake    Current Assessment & Plan     Continue Flintstones/pediatric multivitamin with iron 2 tablets daily.  Repeat labs in 3-6 months.         Relevant Orders    CBC & Differential    Comprehensive Metabolic Panel    TSH    Hemoglobin A1c    Vitamin D 25 Hydroxy    Vitamin B12    Lipid Panel       Other    Multiple environmental allergies    Overview     Grass, roaches, trees and cats          Current Assessment & Plan     Continue Zyrtec, Singulair and Flonase.  Avoid known allergens          Relevant Medications    fluticasone (Flonase) 50 MCG/ACT nasal spray    EPINEPHrine (EpiPen 2-Domingo) 0.3 MG/0.3ML solution auto-injector injection    Other Relevant Orders    CBC & Differential    Comprehensive Metabolic Panel    TSH    Hemoglobin A1c    Vitamin D 25 Hydroxy    Vitamin B12    Lipid Panel      Other Visit Diagnoses     Other allergic rhinitis        refill routine medications  allergy precautions      Relevant Medications    cetirizine (zyrTEC) 10 MG tablet    montelukast (SINGULAIR) 10 MG tablet    Other Relevant Orders    CBC & Differential    Comprehensive Metabolic Panel    TSH    Hemoglobin A1c    Vitamin D 25 Hydroxy    Vitamin B12    Lipid Panel            Current Outpatient Medications:   •  albuterol sulfate  (90 Base) MCG/ACT inhaler, Inhale 2 puffs Every 4 (Four) Hours As Needed for Shortness of Air., Disp: 18 g, Rfl: 5  •  cetirizine (zyrTEC) 10 MG tablet, Take 1 tablet by mouth Every Night., Disp: 30 tablet, Rfl: 5  •  EPINEPHrine (EpiPen 2-Domingo) 0.3 MG/0.3ML solution auto-injector injection, Inject 0.3 mL into the appropriate muscle as directed by prescriber 1 (One) Time for 1 dose., Disp: 1 each, Rfl: 5  •  escitalopram (LEXAPRO) 20 MG tablet, Take 1 tablet by mouth Daily., Disp: 30 tablet, Rfl: 5  •  fluticasone (Flonase) 50 MCG/ACT nasal spray, Administer 2 sprays both nostrils once daily, Disp: 1 bottle, Rfl: 5  •  montelukast (SINGULAIR) 10 MG tablet, Take 1 tablet by mouth Every Night., Disp: 30 tablet, Rfl: 5  •  Pediatric Multivitamins-Iron (FLINTSTONES PLUS IRON) chewable tablet, Chew 2 tablets Daily., Disp: 90 tablet, Rfl: 3  •  vitamin D (ERGOCALCIFEROL) 1.25 MG (63913 UT) capsule capsule, Take 1 capsule by mouth 1 (One) Time Per Week., Disp: 5 capsule, Rfl: 5    Medication list reviewed and discussed with patient.    Coronavirus precautions have been reviewed and discussed.  I have discussed the CDC recommendations  of social distancing, hand washing, wearing mask  and disinfecting commonly touched items. Reviewed need to notify PCP and self quarantine with mild symptoms.  Discussed procedure to obtain Covid-19 testing and notification of PCP/health dept/ED/Urgent Center if symptoms begin. Understanding verbalized.    Pt has been instructed today regarding low fat heart smart diet. Weight management and routine exercise has been recommended. Avoid high fat foods, starchy foods and processed foods. Increase lean meats, fresh vegetables and fresh fruits.          I have discussed diagnosis in detail today allowing time for questions and answers. Patient is aware of reasons to seek urgent or emergent medical care as well as reasons to return to the clinic for evaluation. Possible side effects, interactions and progression of symptoms discussed as well. Patient / family states understanding.   Emotional support and active listening provided.       Follow up in 3- 6  months. Routine labs fasting one week prior to next office visit. Return sooner if needed.         This document has been electronically signed by:  NABEEL Hill, NP-C

## 2020-08-24 ENCOUNTER — TELEPHONE (OUTPATIENT)
Dept: FAMILY MEDICINE CLINIC | Facility: CLINIC | Age: 35
End: 2020-08-24

## 2020-08-24 RX ORDER — FERROUS SULFATE 325(65) MG
325 TABLET ORAL 2 TIMES DAILY
Qty: 60 TABLET | Refills: 5 | Status: SHIPPED | OUTPATIENT
Start: 2020-08-24 | End: 2021-12-09

## 2020-08-24 RX ORDER — DOCUSATE SODIUM 100 MG/1
100 CAPSULE, LIQUID FILLED ORAL DAILY
Qty: 30 CAPSULE | Refills: 5 | Status: SHIPPED | OUTPATIENT
Start: 2020-08-24 | End: 2021-10-20

## 2020-08-24 NOTE — TELEPHONE ENCOUNTER
----- Message from NABEEL Jenkins sent at 8/18/2020  2:32 PM EDT -----  Iron saturation is very low.  Please start ferrous sulfate 325 mg twice daily with meals #60 with 5 refills.  Please call to the pharmacy and add to her med list.  Also have her start on a daily Colace 100 mg #30 with 5 refills.  Tell the patient to keep her appointment this week for additional review.

## 2021-01-11 ENCOUNTER — TELEPHONE (OUTPATIENT)
Dept: FAMILY MEDICINE CLINIC | Facility: CLINIC | Age: 36
End: 2021-01-11

## 2021-01-11 NOTE — TELEPHONE ENCOUNTER
PATIENT WOULD LIKE A COPY OF HER LAST PHYSICAL AND HER LAST TB SKIN TEST. PATIENT NEEDS THIS FOR HER WORK PATIENT NEEDS THE COPY OF HER RECORDS AS SOON AS POSSIBLE. PLEASE CALL PATIENT IF SHE CAN COME TO THE OFFICE TO GET THE RECORDS.    CALL [phone]

## 2021-01-12 ENCOUNTER — OFFICE VISIT (OUTPATIENT)
Dept: FAMILY MEDICINE CLINIC | Facility: CLINIC | Age: 36
End: 2021-01-12

## 2021-01-12 VITALS
OXYGEN SATURATION: 98 % | HEIGHT: 64 IN | WEIGHT: 155 LBS | HEART RATE: 70 BPM | BODY MASS INDEX: 26.46 KG/M2 | SYSTOLIC BLOOD PRESSURE: 108 MMHG | TEMPERATURE: 98.2 F | DIASTOLIC BLOOD PRESSURE: 68 MMHG

## 2021-01-12 DIAGNOSIS — F41.1 GENERALIZED ANXIETY DISORDER: Chronic | ICD-10-CM

## 2021-01-12 DIAGNOSIS — Z91.09 MULTIPLE ENVIRONMENTAL ALLERGIES: Chronic | ICD-10-CM

## 2021-01-12 DIAGNOSIS — J45.40 MODERATE PERSISTENT ASTHMA WITHOUT COMPLICATION: Primary | Chronic | ICD-10-CM

## 2021-01-12 PROCEDURE — 99213 OFFICE O/P EST LOW 20 MIN: CPT | Performed by: NURSE PRACTITIONER

## 2021-01-12 NOTE — PATIENT INSTRUCTIONS
Preventive Care 21-39 Years Old, Female  Preventive care refers to visits with your health care provider and lifestyle choices that can promote health and wellness. This includes:  · A yearly physical exam. This may also be called an annual well check.  · Regular dental visits and eye exams.  · Immunizations.  · Screening for certain conditions.  · Healthy lifestyle choices, such as eating a healthy diet, getting regular exercise, not using drugs or products that contain nicotine and tobacco, and limiting alcohol use.  What can I expect for my preventive care visit?  Physical exam  Your health care provider will check your:  · Height and weight. This may be used to calculate body mass index (BMI), which tells if you are at a healthy weight.  · Heart rate and blood pressure.  · Skin for abnormal spots.  Counseling  Your health care provider may ask you questions about your:  · Alcohol, tobacco, and drug use.  · Emotional well-being.  · Home and relationship well-being.  · Sexual activity.  · Eating habits.  · Work and work environment.  · Method of birth control.  · Menstrual cycle.  · Pregnancy history.  What immunizations do I need?    Influenza (flu) vaccine  · This is recommended every year.  Tetanus, diphtheria, and pertussis (Tdap) vaccine  · You may need a Td booster every 10 years.  Varicella (chickenpox) vaccine  · You may need this if you have not been vaccinated.  Human papillomavirus (HPV) vaccine  · If recommended by your health care provider, you may need three doses over 6 months.  Measles, mumps, and rubella (MMR) vaccine  · You may need at least one dose of MMR. You may also need a second dose.  Meningococcal conjugate (MenACWY) vaccine  · One dose is recommended if you are age 19-21 years and a first-year college student living in a residence [place], or if you have one of several medical conditions. You may also need additional booster doses.  Pneumococcal conjugate (PCV13) vaccine  · You may need  this if you have certain conditions and were not previously vaccinated.  Pneumococcal polysaccharide (PPSV23) vaccine  · You may need one or two doses if you smoke cigarettes or if you have certain conditions.  Hepatitis A vaccine  · You may need this if you have certain conditions or if you travel or work in places where you may be exposed to hepatitis A.  Hepatitis B vaccine  · You may need this if you have certain conditions or if you travel or work in places where you may be exposed to hepatitis B.  Haemophilus influenzae type b (Hib) vaccine  · You may need this if you have certain conditions.  You may receive vaccines as individual doses or as more than one vaccine together in one shot (combination vaccines). Talk with your health care provider about the risks and benefits of combination vaccines.  What tests do I need?    Blood tests  · Lipid and cholesterol levels. These may be checked every 5 years starting at age 20.  · Hepatitis C test.  · Hepatitis B test.  Screening  · Diabetes screening. This is done by checking your blood sugar (glucose) after you have not eaten for a while (fasting).  · Sexually transmitted disease (STD) testing.  · BRCA-related cancer screening. This may be done if you have a family history of breast, ovarian, tubal, or peritoneal cancers.  · Pelvic exam and Pap test. This may be done every 3 years starting at age 21. Starting at age 30, this may be done every 5 years if you have a Pap test in combination with an HPV test.  Talk with your health care provider about your test results, treatment options, and if necessary, the need for more tests.  Follow these instructions at home:  Eating and drinking    · Eat a diet that includes fresh fruits and vegetables, whole grains, lean protein, and low-fat dairy.  · Take vitamin and mineral supplements as recommended by your health care provider.  · Do not drink alcohol if:  ? Your health care provider tells you not to drink.  ? You are  pregnant, may be pregnant, or are planning to become pregnant.  · If you drink alcohol:  ? Limit how much you have to 0-1 drink a day.  ? Be aware of how much alcohol is in your drink. In the [place] one drink equals one 12 oz bottle of beer (355 mL), one 5 oz glass of wine (148 mL), or one 1½ oz glass of hard liquor (44 mL).  Lifestyle  · Take daily care of your teeth and gums.  · Stay active. Exercise for at least 30 minutes on 5 or more days each week.  · Do not use any products that contain nicotine or tobacco, such as cigarettes, e-cigarettes, and chewing tobacco. If you need help quitting, ask your health care provider.  · If you are sexually active, practice safe sex. Use a condom or other form of birth control (contraception) in order to prevent pregnancy and STIs (sexually transmitted infections). If you plan to become pregnant, see your health care provider for a preconception visit.  What's next?  · Visit your health care provider once a year for a well check visit.  · Ask your health care provider how often you should have your eyes and teeth checked.  · Stay up to date on all vaccines.  This information is not intended to replace advice given to you by your health care provider. Make sure you discuss any questions you have with your health care provider.  Document Revised: 08/29/2019 Document Reviewed: 08/29/2019  [employer] Patient Education © 2020 [employer]

## 2021-01-12 NOTE — PROGRESS NOTES
"  History of Present Illness   Zhanna Veras is a 35 y.o. female who presents to the clinic today:     Asthma  She reports is stable with Singulair. Uses Albuterol Inhaler if needed for wheezing.     Multiple Allergies    No recent exacerbations. Does try to avoid as many irritants/triggers as she possible. Uses Zyrtec and Nasal Spray as needed.     Anxiety  Reports no decreased concentration, depressed mood, excessive worry or thoughts of self injury. She is working full time and  is in [employer]. They have four children. Her FT employment has been more stressful with the Pandemic.  Stable with Lexapro    Review of Systems   Constitutional: Negative.    HENT: Negative.    Eyes: Positive for visual disturbance (Wears glasses).   Respiratory: Negative for cough and wheezing.    Cardiovascular: Negative.    Gastrointestinal: Positive for constipation (Intermittent). Negative for diarrhea, nausea and vomiting.   Endocrine: Negative for cold intolerance, heat intolerance, polydipsia, polyphagia and polyuria.   Musculoskeletal: Negative.    Skin: Negative for color change, rash and skin lesions.   Allergic/Immunologic: Positive for environmental allergies.   Neurological: Negative for light-headedness and headache.   Hematological: Negative.    Psychiatric/Behavioral: Positive for stress (COVID/  in school and she is finishing her Masters). Negative for decreased concentration, self-injury and depressed mood.     /68 (BP Location: Right arm, Patient Position: Sitting)   Pulse 70   Temp 98.2 °F (36.8 °C)   Ht 162.6 cm (64.02\")   Wt 70.3 kg (155 lb)   SpO2 98%   BMI 26.59 kg/m²     Physical Exam  Vitals signs reviewed.   Constitutional:       Appearance: She is well-developed.      Comments: Pleasant; good eye contact. Wearing appropriate face covering   HENT:      Head: Normocephalic.      Nose: Nose normal.   Eyes:      General: No scleral icterus.        Right eye: No discharge.         " Left eye: No discharge.      Conjunctiva/sclera: Conjunctivae normal.   Neck:      Musculoskeletal: Neck supple.      Thyroid: No thyromegaly.      Vascular: No JVD.   Cardiovascular:      Rate and Rhythm: Normal rate and regular rhythm.      Heart sounds: Normal heart sounds. No murmur. No friction rub.   Pulmonary:      Effort: Pulmonary effort is normal. No respiratory distress.      Breath sounds: Normal breath sounds. No wheezing or rales.   Abdominal:      General: Bowel sounds are normal. There is no distension.      Palpations: Abdomen is soft.      Tenderness: There is no abdominal tenderness. There is no guarding or rebound.   Musculoskeletal:      Right lower leg: No edema.      Left lower leg: No edema.   Lymphadenopathy:      Cervical: No cervical adenopathy.   Skin:     General: Skin is warm and dry.      Capillary Refill: Capillary refill takes less than 2 seconds.      Findings: No erythema or rash.   Neurological:      Mental Status: She is alert and oriented to person, place, and time.   Psychiatric:         Mood and Affect: Mood normal.         Speech: Speech normal.         Behavior: Behavior is cooperative.         Thought Content: Thought content normal.         Cognition and Memory: Cognition normal.         Judgment: Judgment normal.       Assessment/Plan     Problem List Items Addressed This Visit        Allergies and Adverse Reactions    Multiple environmental allergies    Overview     Grass, roaches, trees and cats          Relevant Medications    fluticasone (Flonase) 50 MCG/ACT nasal spray  cetirizine (zyrTEC) 10 MG tablet       Mental Health    Generalized anxiety disorder    Overview            Relevant Medications    escitalopram (LEXAPRO) 20 MG tablet       Pulmonary and Pneumonias    Moderate persistent asthma without complication - Primary    Relevant Medications    albuterol sulfate  (90 Base) MCG/ACT inhaler    montelukast (SINGULAIR) 10 MG tablet        Findings and  recommendations discussed with Zhanna. Counseled regarding supportive care measures. Provided information about diagnosis. She will f/u with [facility] in February; sooner if problems/concerns occur.

## 2021-03-18 ENCOUNTER — BULK ORDERING (OUTPATIENT)
Dept: CASE MANAGEMENT | Facility: OTHER | Age: 36
End: 2021-03-18

## 2021-03-18 DIAGNOSIS — Z23 IMMUNIZATION DUE: ICD-10-CM

## 2021-05-11 ENCOUNTER — TELEMEDICINE (OUTPATIENT)
Dept: FAMILY MEDICINE CLINIC | Facility: TELEHEALTH | Age: 36
End: 2021-05-11

## 2021-05-11 DIAGNOSIS — N39.0 URINARY TRACT INFECTION WITHOUT HEMATURIA, SITE UNSPECIFIED: Primary | ICD-10-CM

## 2021-05-11 PROCEDURE — 99213 OFFICE O/P EST LOW 20 MIN: CPT | Performed by: NURSE PRACTITIONER

## 2021-05-11 RX ORDER — PHENAZOPYRIDINE HYDROCHLORIDE 200 MG/1
200 TABLET, FILM COATED ORAL 3 TIMES DAILY PRN
Qty: 6 TABLET | Refills: 0 | Status: SHIPPED | OUTPATIENT
Start: 2021-05-11 | End: 2021-05-13

## 2021-05-11 RX ORDER — SULFAMETHOXAZOLE AND TRIMETHOPRIM 800; 160 MG/1; MG/1
1 TABLET ORAL 2 TIMES DAILY
Qty: 14 TABLET | Refills: 0 | Status: SHIPPED | OUTPATIENT
Start: 2021-05-11 | End: 2021-05-18

## 2021-05-11 NOTE — PATIENT INSTRUCTIONS
Decrease caffeine since it is a bladder irritant. Follow up with Urgent Care if symptoms worsen or the treatment provided does not resolve the illness. Go to the nearest Emergency Department for any signs of worsening kidney infection, such as severe back or abdominal pain with or without Nausea/Vomiting, fever, chills, hematuria or difficulty urinating.   Urinary Tract Infection, Adult    A urinary tract infection (UTI) is an infection of any part of the urinary tract. The urinary tract includes the kidneys, ureters, bladder, and urethra. These organs make, store, and get rid of urine in the body.  Your health care provider may use other names to describe the infection. An upper UTI affects the ureters and kidneys (pyelonephritis). A lower UTI affects the bladder (cystitis) and urethra (urethritis).  What are the causes?  Most urinary tract infections are caused by bacteria in your genital area, around the entrance to your urinary tract (urethra). These bacteria grow and cause inflammation of your urinary tract.  What increases the risk?  You are more likely to develop this condition if:  · You have a urinary catheter that stays in place (indwelling).  · You are not able to control when you urinate or have a bowel movement (you have incontinence).  · You are female and you:  ? Use a spermicide or diaphragm for birth control.  ? Have low estrogen levels.  ? Are pregnant.  · You have certain genes that increase your risk (genetics).  · You are sexually active.  · You take antibiotic medicines.  · You have a condition that causes your flow of urine to slow down, such as:  ? An enlarged prostate, if you are male.  ? Blockage in your urethra (stricture).  ? A kidney stone.  ? A nerve condition that affects your bladder control (neurogenic bladder).  ? Not getting enough to drink, or not urinating often.  · You have certain medical conditions, such as:  ? Diabetes.  ? A weak disease-fighting system  (immunesystem).  ? Sickle cell disease.  ? Gout.  ? Spinal cord injury.  What are the signs or symptoms?  Symptoms of this condition include:  · Needing to urinate right away (urgently).  · Frequent urination or passing small amounts of urine frequently.  · Pain or burning with urination.  · Blood in the urine.  · Urine that smells bad or unusual.  · Trouble urinating.  · Cloudy urine.  · Vaginal discharge, if you are female.  · Pain in the abdomen or the lower back.  You may also have:  · Vomiting or a decreased appetite.  · Confusion.  · Irritability or tiredness.  · A fever.  · Diarrhea.  The first symptom in older adults may be confusion. In some cases, they may not have any symptoms until the infection has worsened.  How is this diagnosed?  This condition is diagnosed based on your medical history and a physical exam. You may also have other tests, including:  · Urine tests.  · Blood tests.  · Tests for sexually transmitted infections (STIs).  If you have had more than one UTI, a cystoscopy or imaging studies may be done to determine the cause of the infections.  How is this treated?  Treatment for this condition includes:  · Antibiotic medicine.  · Over-the-counter medicines to treat discomfort.  · Drinking enough water to stay hydrated.  If you have frequent infections or have other conditions such as a kidney stone, you may need to see a health care provider who specializes in the urinary tract (urologist).  In rare cases, urinary tract infections can cause sepsis. Sepsis is a life-threatening condition that occurs when the body responds to an infection. Sepsis is treated in the hospital with IV antibiotics, fluids, and other medicines.  Follow these instructions at home:    Medicines  · Take over-the-counter and prescription medicines only as told by your health care provider.  · If you were prescribed an antibiotic medicine, take it as told by your health care provider. Do not stop using the antibiotic  even if you start to feel better.  General instructions  · Make sure you:  ? Empty your bladder often and completely. Do not hold urine for long periods of time.  ? Empty your bladder after sex.  ? Wipe from front to back after a bowel movement if you are female. Use each tissue one time when you wipe.  · Drink enough fluid to keep your urine pale yellow.  · Keep all follow-up visits as told by your health care provider. This is important.  Contact a health care provider if:  · Your symptoms do not get better after 1-2 days.  · Your symptoms go away and then return.  Get help right away if you have:  · Severe pain in your back or your lower abdomen.  · A fever.  · Nausea or vomiting.  Summary  · A urinary tract infection (UTI) is an infection of any part of the urinary tract, which includes the kidneys, ureters, bladder, and urethra.  · Most urinary tract infections are caused by bacteria in your genital area, around the entrance to your urinary tract (urethra).  · Treatment for this condition often includes antibiotic medicines.  · If you were prescribed an antibiotic medicine, take it as told by your health care provider. Do not stop using the antibiotic even if you start to feel better.  · Keep all follow-up visits as told by your health care provider. This is important.  This information is not intended to replace advice given to you by your health care provider. Make sure you discuss any questions you have with your health care provider.  Document Revised: 12/05/2019 Document Reviewed: 06/27/2019  [employer] Patient Education © 2021 [employer]

## 2021-05-11 NOTE — PROGRESS NOTES
Chief Complaint  Urinary Tract Infection    Subjective          Zhanna Veras presents to [facility]  Urinary burning and frequency/urgency with OTC urine test positive for blood and infection. She has not had a fever, chills, nausea or vomiting. She has had UTI in the past and this feels the same, but she has not had one in over a year. She denies back pain, abdominal pain.    Urinary Tract Infection   This is a new problem. The current episode started in the past 7 days. The problem occurs every urination. The quality of the pain is described as burning. The pain is mild. There has been no fever. There is no history of pyelonephritis. Associated symptoms include frequency, hematuria (no visible blood but it did show positive for blood on the home test) and urgency. Pertinent negatives include no chills, discharge, flank pain, nausea, possible pregnancy or vomiting. She has tried increased fluids for the symptoms. The treatment provided no relief. There is no history of kidney stones or recurrent UTIs.       Objective   Vital Signs:   There were no vitals taken for this visit.    Physical Exam   Result Review :       Data reviewed: most recent encounter          Assessment and Plan    Diagnoses and all orders for this visit:    1. Urinary tract infection without hematuria, site unspecified (Primary)  -     sulfamethoxazole-trimethoprim (Bactrim DS) 800-160 MG per tablet; Take 1 tablet by mouth 2 (Two) Times a Day for 7 days.  Dispense: 14 tablet; Refill: 0  -     phenazopyridine (Pyridium) 200 MG tablet; Take 1 tablet by mouth 3 (Three) Times a Day As Needed for Bladder Spasms for up to 2 days.  Dispense: 6 tablet; Refill: 0     Decrease caffeine since it is a bladder irritant. Follow up with Urgent Care if symptoms worsen or the treatment provided does not resolve the illness. Go to the nearest Emergency Department for any signs of worsening kidney infection, such as severe back  or abdominal pain with or without Nausea/Vomiting, fever, chills, hematuria or difficulty urinating.         Follow Up   No follow-ups on file.  Patient was given instructions and counseling regarding her condition or for health maintenance advice. Please see specific information pulled into the AVS if appropriate.

## 2021-07-20 DIAGNOSIS — J30.89 OTHER ALLERGIC RHINITIS: ICD-10-CM

## 2021-07-20 DIAGNOSIS — J45.40 MODERATE PERSISTENT ASTHMA WITHOUT COMPLICATION: ICD-10-CM

## 2021-07-21 RX ORDER — ESCITALOPRAM OXALATE 20 MG/1
20 TABLET ORAL DAILY
Qty: 30 TABLET | Refills: 5 | Status: SHIPPED | OUTPATIENT
Start: 2021-07-21 | End: 2021-12-09 | Stop reason: SDUPTHER

## 2021-07-21 RX ORDER — MONTELUKAST SODIUM 10 MG/1
10 TABLET ORAL NIGHTLY
Qty: 30 TABLET | Refills: 5 | Status: SHIPPED | OUTPATIENT
Start: 2021-07-21 | End: 2021-07-21 | Stop reason: SDUPTHER

## 2021-07-21 RX ORDER — ESCITALOPRAM OXALATE 20 MG/1
20 TABLET ORAL DAILY
Qty: 30 TABLET | Refills: 5 | Status: SHIPPED | OUTPATIENT
Start: 2021-07-21 | End: 2021-07-21 | Stop reason: SDUPTHER

## 2021-07-21 RX ORDER — MONTELUKAST SODIUM 10 MG/1
10 TABLET ORAL NIGHTLY
Qty: 30 TABLET | Refills: 5 | Status: SHIPPED | OUTPATIENT
Start: 2021-07-21 | End: 2021-12-09 | Stop reason: SDUPTHER

## 2021-08-23 ENCOUNTER — TELEMEDICINE (OUTPATIENT)
Dept: FAMILY MEDICINE CLINIC | Facility: TELEHEALTH | Age: 36
End: 2021-08-23

## 2021-08-23 DIAGNOSIS — Z20.822 ENCOUNTER BY TELEHEALTH FOR SUSPECTED COVID-19: Primary | ICD-10-CM

## 2021-08-23 DIAGNOSIS — R09.81 SINUS CONGESTION: ICD-10-CM

## 2021-08-23 DIAGNOSIS — J02.9 SORE THROAT: ICD-10-CM

## 2021-08-23 PROCEDURE — 99213 OFFICE O/P EST LOW 20 MIN: CPT | Performed by: NURSE PRACTITIONER

## 2021-08-23 RX ORDER — GUAIFENESIN 600 MG/1
1200 TABLET, EXTENDED RELEASE ORAL 2 TIMES DAILY
Qty: 20 TABLET | Refills: 0 | Status: SHIPPED | OUTPATIENT
Start: 2021-08-23 | End: 2021-10-20

## 2021-08-23 RX ORDER — DM/PSEUDOEPHED/ACETAMINOPHEN 15-30-500
1 TABLET ORAL DAILY
Qty: 21 EACH | Refills: 0 | Status: SHIPPED | OUTPATIENT
Start: 2021-08-23 | End: 2021-10-20

## 2021-08-23 NOTE — PATIENT INSTRUCTIONS
Pseudoephedrine extended-release tablets  What is this medicine?  PSEUDOEPHEDRINE (harshad nieves e FED rin) is a decongestant. It is used to treat congestion of the nose or sinuses.  This medicine may be used for other purposes; ask your health care provider or pharmacist if you have questions.  COMMON BRAND NAME(S): Dimetapp Decongestant, Drixoral, Sudafed 12 Hour, Sudafed 24 Hour, Sudafed Sinus Congestion, Sudogest 12 Hour  What should I tell my health care provider before I take this medicine?  They need to know if you have any of the following conditions:  · bowel problems  · diabetes  · glaucoma  · heart disease  · high blood pressure  · kidney disease  · prostate trouble  · taken an MAOI like Carbex, Eldepryl, Marplan, Nardil, or Parnate in last 14 days  · thyroid disease  · trouble passing urine  · an unusual or allergic reaction to pseudoephedrine, other medicines, foods, dyes, or preservatives  · pregnant or trying to get pregnant  · breast-feeding  How should I use this medicine?  Take this medicine by mouth with a glass of water. Follow the directions on the package or prescription label. Do not cut, crush or chew this medicine. Take your medicine at regular intervals. Do not take your medicine more often than directed.  Talk to your pediatrician regarding the use of this medicine in children. While this drug may be prescribed for children as young as 12 years of age for selected conditions, precautions do apply.  Patients over 65 years old may have a stronger reaction and need a smaller dose.  Overdosage: If you think you have taken too much of this medicine contact a poison control center or emergency room at once.  NOTE: This medicine is only for you. Do not share this medicine with others.  What if I miss a dose?  If you miss a dose, take it as soon as you can. If it is almost time for your next dose, take only that dose. Do not take double or extra doses.  What may interact with this medicine?  Do not take  this medicine with any of the following medications:  · bromocriptine  · ergot alkaloids like dihydroergotamine, ergonovine, ergotamine, methylergonovine  · MAOIs like Carbex, Eldepryl, Marplan, Nardil, and Parnate  · stimulant medicines for attention disorders, weight loss, or to stay awake  This medicine may also interact with the following medications:  · alcohol  · atropine  · bretylium  · caffeine  · digoxin  · linezolid  · mecamylamine  · medicines for blood pressure  · medicines for depression, anxiety, or psychotic disturbances like fluoxetine, sertraline  · medicines for enlarged prostate  · medicines for sleep  · other medicines for cold, cough, or allergy  · procarbazine  · reserpine  · some heart medicines like metoprolol  · Carolee's Wort  This list may not describe all possible interactions. Give your health care provider a list of all the medicines, herbs, non-prescription drugs, or dietary supplements you use. Also tell them if you smoke, drink alcohol, or use illegal drugs. Some items may interact with your medicine.  What should I watch for while using this medicine?  Tell your doctor or healthcare professional if your symptoms do not start to get better or if they get worse.  See your doctor if you are not better in 7 days or if you have a fever.  If this medicine makes it hard for you to sleep, try taking the dose earlier in the day. If you still have trouble sleeping, stop taking this medicine and see your doctor.  The tablet shell for some brands of this medicine does not dissolve and may appear whole in the stool. This is not a cause for concern.  What side effects may I notice from receiving this medicine?  Side effects that you should report to your doctor or health care professional as soon as possible:  · allergic reactions like skin rash, itching or hives, swelling of the face, lips, or tongue  · bloody diarrhea with stomach pain  · breathing problems  · chest pain  · confused, agitated,  nervous  · fast, irregular heartbeat  · feeling faint or lightheaded, falls  · hallucinations  · high blood pressure  · pain, tingling, numbness in the hands or feet  · trouble passing urine or change in the amount of urine  · trouble sleeping  Side effects that usually do not require medical attention (report to your doctor or health care professional if they continue or are bothersome):  · headache  · loss of appetite  · nausea, stomach upset  This list may not describe all possible side effects. Call your doctor for medical advice about side effects. You may report side effects to FDA at 1-916-SYR-3900.  Where should I keep my medicine?  Keep out of the reach of children.  This medicine may cause accidental overdose and death if taken by other adults, children, or pets. Mix any unused medicine with a substance like cat littler or coffee grounds. Then throw the medicine away in a sealed container like a sealed bag or a coffee can with a lid. Do not use the medicine after the expiration date.  Store at room temperature between 15 and 25 degrees C (59 and 77 degrees F). Protect from heat and moisture.  NOTE: This sheet is a summary. It may not cover all possible information. If you have questions about this medicine, talk to your doctor, pharmacist, or health care provider.  © 2021 Elsevier/Gold Standard (2015-08-22 19:26:28)  Pharyngitis    Pharyngitis is a sore throat (pharynx). This is when there is redness, pain, and swelling in your throat. Most of the time, this condition gets better on its own. In some cases, you may need medicine.  Follow these instructions at home:  · Take over-the-counter and prescription medicines only as told by your doctor.  ? If you were prescribed an antibiotic medicine, take it as told by your doctor. Do not stop taking the antibiotic even if you start to feel better.  ? Do not give children aspirin. Aspirin has been linked to Reye syndrome.  · Drink enough water and fluids to keep  your pee (urine) clear or pale yellow.  · Get a lot of rest.  · Rinse your mouth (gargle) with a salt-water mixture 3-4 times a day or as needed. To make a salt-water mixture, completely dissolve ½-1 tsp of salt in 1 cup of warm water.  · If your doctor approves, you may use throat lozenges or sprays to soothe your throat.  Contact a doctor if:  · You have large, tender lumps in your neck.  · You have a rash.  · You cough up green, yellow-brown, or bloody spit.  Get help right away if:  · You have a stiff neck.  · You drool or cannot swallow liquids.  · You cannot drink or take medicines without throwing up.  · You have very bad pain that does not go away with medicine.  · You have problems breathing, and it is not from a stuffy nose.  · You have new pain and swelling in your knees, ankles, wrists, or elbows.  Summary  · Pharyngitis is a sore throat (pharynx). This is when there is redness, pain, and swelling in your throat.  · If you were prescribed an antibiotic medicine, take it as told by your doctor. Do not stop taking the antibiotic even if you start to feel better.  · Most of the time, pharyngitis gets better on its own. Sometimes, you may need medicine.  This information is not intended to replace advice given to you by your health care provider. Make sure you discuss any questions you have with your health care provider.  Document Revised: 11/30/2018 Document Reviewed: 01/23/2018  Elsevier Patient Education © 2021 Elsevier Inc.  COVID-19: Quarantine vs. Isolation  QUARANTINE keeps someone who was in close contact with someone who has COVID-19 away from others.  If you had close contact with a person who has COVID-19  · The best way to protect yourself and others is to stay home for 14 days after your last contact. Check your local health department's website for information about options in your area to possibly shorten this quarantine period.  · Check your temperature twice a day and watch for symptoms of  "COVID-19.  · If possible, stay away from people who are at higher-risk for getting very sick from COVID-19.  ISOLATION keeps someone who is sick or tested positive for COVID-19 without symptoms away from others, even in their own home.  If you are sick and think or know you have COVID-19  · Stay home until after  ? At least 10 days since symptoms first appeared and  ? At least 24 hours with no fever without fever-reducing medication and  ? Symptoms have improved  If you tested positive for COVID-19 but do not have symptoms  · Stay home until after  ? 10 days have passed since your positive test  If you live with others, stay in a specific \"sick room\" or area and away from other people or animals, including pets. Use a separate bathroom, if available.  cdc.gov/coronavirus  12/17/2020  This information is not intended to replace advice given to you by your health care provider. Make sure you discuss any questions you have with your health care provider.  Document Revised: 04/15/2021 Document Reviewed: 04/15/2021  Elsevier Patient Education © 2021 Elsevier Inc.    "

## 2021-08-23 NOTE — PROGRESS NOTES
CHIEF COMPLAINT  No chief complaint on file.        HPI  Zhanna Veras is a 35 y.o. female  presents with complaint of recent exposure to covid 19 by her . She has been tested today and is awaiting the results. She states she has extreme frontal sinus pain and pressure, mild sore throat and ear pain present for 2 days. She is using  mg. Po for pain, without relief. She reports she hasn't checked her temperature but does not feel feverish.     Review of Systems   Constitutional: Positive for activity change, appetite change and fatigue. Negative for chills and fever.   HENT: Positive for congestion (nasal and sinus congestion), ear pain, postnasal drip, rhinorrhea, sinus pressure, sinus pain and sore throat.    Respiratory: Negative.    Cardiovascular: Negative.    Gastrointestinal: Negative.    Skin: Negative.    Allergic/Immunologic: Positive for environmental allergies.   Neurological: Positive for headaches (frontal sinus pain).   Hematological: Negative.    Psychiatric/Behavioral: Negative.        Past Medical History:   Diagnosis Date   • Abdominal pain    • ADHD (attention deficit hyperactivity disorder) 2017   • Allergic 2018   • Allergic rhinitis    • Anemia 2020   • Anxiety 9/7/16   • Arthritis     osteo   • Asthma    • Cough    • Depression    • Ectopic pregnancy    • Headache 2019   • History of transfusion    • HPV (human papilloma virus) infection    • Malaise and fatigue    • Respiratory abnormality    • Urinary tract infection    • Visual impairment 8/1/95    Have had glasses since 4th grade   • Vitamin D deficiency        Family History   Problem Relation Age of Onset   • Asthma Mother    • COPD Mother    • Diabetes Mother    • Hypertension Mother    • Anxiety disorder Mother    • Depression Mother    • Hyperlipidemia Mother    • Alcohol abuse Father    • Cancer Father    • Arthritis Paternal Grandmother    • Cancer Paternal Grandmother    • COPD Paternal Grandmother    • Stroke  Paternal Grandmother    • Asthma Paternal Grandmother    • Miscarriages / Stillbirths Paternal Grandmother         Last child was stillborn   • Learning disabilities Son         ADHD/ODD   • Learning disabilities Son         ADHD       Social History     Socioeconomic History   • Marital status:      Spouse name: Not on file   • Number of children: Not on file   • Years of education: Not on file   • Highest education level: Not on file   Tobacco Use   • Smoking status: Former Smoker     Packs/day: 0.25     Years: 1.00     Pack years: 0.25     Types: Cigarettes     Start date: 6/1/2005     Quit date: 2006     Years since quitting: 15.2   • Smokeless tobacco: Never Used   Substance and Sexual Activity   • Alcohol use: No   • Drug use: No   • Sexual activity: Yes     Partners: Male     Birth control/protection: Abstinence, Coitus interruptus, Condom         There were no vitals taken for this visit.    PHYSICAL EXAM  Physical Exam   Constitutional: She is oriented to person, place, and time. She appears well-developed and well-nourished. She does not have a sickly appearance. She does not appear ill. No distress.   HENT:   Head: Normocephalic and atraumatic. without day-orbital edema.No periorbital erythema noted.  Right Ear: Hearing normal. No drainage.   Left Ear: Hearing normal. No drainage.   Pulmonary/Chest: Effort normal.  No respiratory distress.  Neurological: She is alert and oriented to person, place, and time.   Psychiatric: She has a normal mood and affect.   Vitals reviewed.      Results for orders placed or performed in visit on 08/17/20   Comprehensive Metabolic Panel    Specimen: Arm, Right; Blood   Result Value Ref Range    Glucose 87 65 - 99 mg/dL    BUN 12 6 - 20 mg/dL    Creatinine 0.57 0.57 - 1.00 mg/dL    Sodium 138 136 - 145 mmol/L    Potassium 3.9 3.5 - 5.2 mmol/L    Chloride 106 98 - 107 mmol/L    CO2 22.6 22.0 - 29.0 mmol/L    Calcium 9.1 8.6 - 10.5 mg/dL    Total Protein 6.9 6.0 - 8.5  g/dL    Albumin 4.20 3.50 - 5.20 g/dL    ALT (SGPT) 11 1 - 33 U/L    AST (SGOT) 10 1 - 32 U/L    Alkaline Phosphatase 55 39 - 117 U/L    Total Bilirubin 0.4 0.0 - 1.2 mg/dL    eGFR Non African Amer 121 >60 mL/min/1.73    Globulin 2.7 gm/dL    A/G Ratio 1.6 g/dL    BUN/Creatinine Ratio 21.1 7.0 - 25.0    Anion Gap 9.4 5.0 - 15.0 mmol/L   TSH    Specimen: Arm, Right; Blood   Result Value Ref Range    TSH 2.020 0.270 - 4.200 uIU/mL   Hemoglobin A1c    Specimen: Arm, Right; Blood   Result Value Ref Range    Hemoglobin A1C 4.76 (L) 4.80 - 5.60 %   Vitamin D 25 Hydroxy    Specimen: Arm, Right; Blood   Result Value Ref Range    25 Hydroxy, Vitamin D 32.0 30.0 - 100.0 ng/ml   Vitamin B12    Specimen: Arm, Right; Blood   Result Value Ref Range    Vitamin B-12 445 211 - 946 pg/mL   Lipid Panel    Specimen: Arm, Right; Blood   Result Value Ref Range    Total Cholesterol 157 0 - 200 mg/dL    Triglycerides 55 0 - 150 mg/dL    HDL Cholesterol 40 40 - 60 mg/dL    LDL Cholesterol  106 (H) 0 - 100 mg/dL    VLDL Cholesterol 11 5 - 40 mg/dL    LDL/HDL Ratio 2.65    Uric Acid    Specimen: Arm, Right; Blood   Result Value Ref Range    Uric Acid 3.3 2.4 - 5.7 mg/dL   Iron and TIBC    Specimen: Arm, Right; Blood   Result Value Ref Range    Iron 51 37 - 145 mcg/dL    Iron Saturation 14 (L) 20 - 50 %    Transferrin 246 200 - 360 mg/dL    TIBC 367 298 - 536 mcg/dL   CBC Auto Differential    Specimen: Arm, Right; Blood   Result Value Ref Range    WBC 5.62 3.40 - 10.80 10*3/mm3    RBC 4.20 3.77 - 5.28 10*6/mm3    Hemoglobin 12.2 12.0 - 15.9 g/dL    Hematocrit 37.1 34.0 - 46.6 %    MCV 88.3 79.0 - 97.0 fL    MCH 29.0 26.6 - 33.0 pg    MCHC 32.9 31.5 - 35.7 g/dL    RDW 12.9 12.3 - 15.4 %    RDW-SD 42.1 37.0 - 54.0 fl    MPV 10.9 6.0 - 12.0 fL    Platelets 204 140 - 450 10*3/mm3    Neutrophil % 55.2 42.7 - 76.0 %    Lymphocyte % 35.4 19.6 - 45.3 %    Monocyte % 6.0 5.0 - 12.0 %    Eosinophil % 2.3 0.3 - 6.2 %    Basophil % 0.7 0.0 - 1.5 %     Immature Grans % 0.4 0.0 - 0.5 %    Neutrophils, Absolute 3.10 1.70 - 7.00 10*3/mm3    Lymphocytes, Absolute 1.99 0.70 - 3.10 10*3/mm3    Monocytes, Absolute 0.34 0.10 - 0.90 10*3/mm3    Eosinophils, Absolute 0.13 0.00 - 0.40 10*3/mm3    Basophils, Absolute 0.04 0.00 - 0.20 10*3/mm3    Immature Grans, Absolute 0.02 0.00 - 0.05 10*3/mm3    nRBC 0.0 0.0 - 0.2 /100 WBC       Diagnoses and all orders for this visit:    1. Encounter by telehealth for suspected COVID-19 (Primary)  -     QUESTIONNAIRE SERIES    2. Sinus congestion  -     Pseudoephedrine HCl ER (Sudafed Sinus Congestion 24HR) 240 MG tablet sustained-release 24 hour; Take 1 tablet by mouth Daily.  Dispense: 21 each; Refill: 0  -     guaiFENesin (Mucinex) 600 MG 12 hr tablet; Take 2 tablets by mouth 2 (Two) Times a Day.  Dispense: 20 tablet; Refill: 0    3. Sore throat    Increase decaffeinated fluids and rest  Quarantine until covid 19 test results available  Warm salt water gargles prn sore throat pain.       FOLLOW-UP  As discussed during visit with PCP/[facility] if no improvement or Urgent Care/Emergency Department if worsening of symptoms    Patient verbalizes understanding of medication dosage, comfort measures, instructions for treatment and follow-up.    Jessica Grant, NABEEL  08/23/2021  13:23 EDT    This visit was performed via Telehealth.  This patient has been instructed to follow-up with their primary care provider if their symptoms worsen or the treatment provided does not resolve their illness.

## 2021-09-07 NOTE — TELEPHONE ENCOUNTER
Sent PA request in to the patients pharmacy.     ----- Message from NABEEL Jenkins sent at 2/18/2020  8:20 AM EST -----  Great . We need to attempt to get it PA'd.   ----- Message -----  From: Tami Perry MA  Sent: 2/17/2020   9:08 AM EST  To: NABEEL Jenkins    Patient stated that it worked really good when she had to use it.   ----- Message -----  From: Gali Masters APRN  Sent: 2/16/2020   6:38 AM EST  To: Tami Perry MA    Call and see how she did with the new Ubrelvy over the weekend.   Thank you.    Gali         
n/a

## 2021-09-18 ENCOUNTER — TELEMEDICINE (OUTPATIENT)
Dept: FAMILY MEDICINE CLINIC | Facility: TELEHEALTH | Age: 36
End: 2021-09-18

## 2021-09-18 DIAGNOSIS — J45.901 EXACERBATION OF ASTHMA, UNSPECIFIED ASTHMA SEVERITY, UNSPECIFIED WHETHER PERSISTENT: ICD-10-CM

## 2021-09-18 DIAGNOSIS — J01.40 ACUTE PANSINUSITIS, RECURRENCE NOT SPECIFIED: Primary | ICD-10-CM

## 2021-09-18 PROCEDURE — 99213 OFFICE O/P EST LOW 20 MIN: CPT | Performed by: NURSE PRACTITIONER

## 2021-09-18 RX ORDER — PREDNISONE 20 MG/1
20 TABLET ORAL 2 TIMES DAILY
Qty: 7 TABLET | Refills: 0 | Status: SHIPPED | OUTPATIENT
Start: 2021-09-18 | End: 2021-10-20

## 2021-09-18 RX ORDER — DOXYCYCLINE 100 MG/1
100 TABLET ORAL 2 TIMES DAILY
Qty: 20 TABLET | Refills: 0 | Status: SHIPPED | OUTPATIENT
Start: 2021-09-18 | End: 2021-09-28

## 2021-09-18 NOTE — PROGRESS NOTES
Subjective   Zhanna Veras is a 35 y.o. female.     Her symptoms have been going on for 3 weeks, she has congestion on the left side of her face, headaches, cough, sore chest. She test negative for covid PCR. It extends to the ears. She has used her rescue inhaler three times. She has asthma.       The following portions of the patient's history were reviewed and updated as appropriate: allergies, current medications, past family history, past medical history, past social history, past surgical history and problem list.    Review of Systems   Constitutional: Positive for appetite change (decreased at time) and fever (100.2 tmax).   HENT: Positive for ear pain, postnasal drip, rhinorrhea (thick and yellow), sinus pressure and voice change. Negative for nosebleeds.    Respiratory: Positive for cough, chest tightness and shortness of breath (has asthma).    Gastrointestinal: Positive for nausea.   Neurological: Positive for headache.       Objective   Physical Exam  Constitutional:       General: She is not in acute distress.     Appearance: She is well-developed. She is not diaphoretic.   HENT:      Nose:      Left Sinus: Maxillary sinus tenderness and frontal sinus tenderness present.      Comments: Pt guided exam  Pulmonary:      Effort: Pulmonary effort is normal.   Neurological:      Mental Status: She is alert and oriented to person, place, and time.   Psychiatric:         Behavior: Behavior normal.           Assessment/Plan   Diagnoses and all orders for this visit:    1. Acute pansinusitis, recurrence not specified (Primary)  -     doxycycline (ADOXA) 100 MG tablet; Take 1 tablet by mouth 2 (Two) Times a Day for 10 days.  Dispense: 20 tablet; Refill: 0  -     predniSONE (DELTASONE) 20 MG tablet; Take 1 tablet by mouth 2 (Two) Times a Day.  Dispense: 7 tablet; Refill: 0               The use of a video visit has been reviewed with the patient and verbal informed consent has been obtained. Myself and Zhanna  José Veras participated in this visit. The patient is located in [place], [place]. I am located in [place], [place]. Mychart and Zoom were utilized. I spent 20 minutes in the patient's chart for this visit.

## 2021-09-18 NOTE — PATIENT INSTRUCTIONS
-Take antibiotic as prescribed even if you start feeling better. You don't have to use all the prednisone.  May use tylenol or warm moist compress on the face for pain. Avoid ibuprofen and sudafed while taking prednisone.  -May use saline nasal spray, netipot or flonase as desired  -If symptoms worsen or do not improve in 1 week follow up with urgent care or your primary care provider      Sinusitis, Adult  Sinusitis is soreness and swelling (inflammation) of your sinuses. Sinuses are hollow spaces in the bones around your face. They are located:  · Around your eyes.  · In the middle of your forehead.  · Behind your nose.  · In your cheekbones.  Your sinuses and nasal passages are lined with a fluid called mucus. Mucus drains out of your sinuses. Swelling can trap mucus in your sinuses. This lets germs (bacteria, virus, or fungus) grow, which leads to infection. Most of the time, this condition is caused by a virus.  What are the causes?  This condition is caused by:  · Allergies.  · Asthma.  · Germs.  · Things that block your nose or sinuses.  · Growths in the nose (nasal polyps).  · Chemicals or irritants in the air.  · Fungus (rare).  What increases the risk?  You are more likely to develop this condition if:  · You have a weak body defense system (immune system).  · You do a lot of swimming or diving.  · You use nasal sprays too much.  · You smoke.  What are the signs or symptoms?  The main symptoms of this condition are pain and a feeling of pressure around the sinuses. Other symptoms include:  · Stuffy nose (congestion).  · Runny nose (drainage).  · Swelling and warmth in the sinuses.  · Headache.  · Toothache.  · A cough that may get worse at night.  · Mucus that collects in the throat or the back of the nose (postnasal drip).  · Being unable to smell and taste.  · Being very tired (fatigue).  · A fever.  · Sore throat.  · Bad breath.  How is this diagnosed?  This condition is diagnosed based on:  · Your  symptoms.  · Your medical history.  · A physical exam.  · Tests to find out if your condition is short-term (acute) or long-term (chronic). Your doctor may:  ? Check your nose for growths (polyps).  ? Check your sinuses using a tool that has a light (endoscope).  ? Check for allergies or germs.  ? Do imaging tests, such as an MRI or CT scan.  How is this treated?  Treatment for this condition depends on the cause and whether it is short-term or long-term.  · If caused by a virus, your symptoms should go away on their own within 10 days. You may be given medicines to relieve symptoms. They include:  ? Medicines that shrink swollen tissue in the nose.  ? Medicines that treat allergies (antihistamines).  ? A spray that treats swelling of the nostrils.   ? Rinses that help get rid of thick mucus in your nose (nasal saline washes).  · If caused by bacteria, your doctor may wait to see if you will get better without treatment. You may be given antibiotic medicine if you have:  ? A very bad infection.  ? A weak body defense system.  · If caused by growths in the nose, you may need to have surgery.  Follow these instructions at home:  Medicines  · Take, use, or apply over-the-counter and prescription medicines only as told by your doctor. These may include nasal sprays.  · If you were prescribed an antibiotic medicine, take it as told by your doctor. Do not stop taking the antibiotic even if you start to feel better.  Hydrate and humidify    · Drink enough water to keep your pee (urine) pale yellow.  · Use a cool mist humidifier to keep the humidity level in your home above 50%.  · Breathe in steam for 10-15 minutes, 3-4 times a day, or as told by your doctor. You can do this in the bathroom while a hot shower is running.  · Try not to spend time in cool or dry air.    Rest  · Rest as much as you can.  · Sleep with your head raised (elevated).  · Make sure you get enough sleep each night.  General instructions    · Put a  warm, moist washcloth on your face 3-4 times a day, or as often as told by your doctor. This will help with discomfort.  · Wash your hands often with soap and water. If there is no soap and water, use hand .  · Do not smoke. Avoid being around people who are smoking (secondhand smoke).  · Keep all follow-up visits as told by your doctor. This is important.    Contact a doctor if:  · You have a fever.  · Your symptoms get worse.  · Your symptoms do not get better within 10 days.  Get help right away if:  · You have a very bad headache.  · You cannot stop throwing up (vomiting).  · You have very bad pain or swelling around your face or eyes.  · You have trouble seeing.  · You feel confused.  · Your neck is stiff.  · You have trouble breathing.  Summary  · Sinusitis is swelling of your sinuses. Sinuses are hollow spaces in the bones around your face.  · This condition is caused by tissues in your nose that become inflamed or swollen. This traps germs. These can lead to infection.  · If you were prescribed an antibiotic medicine, take it as told by your doctor. Do not stop taking it even if you start to feel better.  · Keep all follow-up visits as told by your doctor. This is important.  This information is not intended to replace advice given to you by your health care provider. Make sure you discuss any questions you have with your health care provider.  Document Revised: 05/20/2019 Document Reviewed: 05/20/2019  [employer] Patient Education © 2021 [employer]    Doxycycline tablets or capsules  What is this medicine?  DOXYCYCLINE (dox beckie diamond) is a tetracycline antibiotic. It kills certain bacteria or stops their growth. It is used to treat many kinds of infections, like dental, skin, respiratory, and urinary tract infections. It also treats acne, Lyme disease, malaria, and certain sexually transmitted infections.  This medicine may be used for other purposes; ask your health care provider or pharmacist  if you have questions.  COMMON BRAND NAME(S): Acticlate, Adoxa, Adoxa CK, Adoxa Domingo, Adoxa TT, Alodox, Avidoxy, Doxal, LYMEPAK, Mondoxyne NL, Monodox, Morgidox 1x, Morgidox 1x Kit, Morgidox 2x, Morgidox 2x Kit, NutriDox, Ocudox, Okebo, Periostat, TARGADOX, Vibra-Tabs, Vibramycin  What should I tell my health care provider before I take this medicine?  They need to know if you have any of these conditions:  · liver disease  · long exposure to sunlight like working outdoors  · stomach problems like colitis  · an unusual or allergic reaction to doxycycline, tetracycline antibiotics, other medicines, foods, dyes, or preservatives  · pregnant or trying to get pregnant  · breast-feeding  How should I use this medicine?  Take this medicine by mouth with a full glass of water. Follow the directions on the prescription label. It is best to take this medicine without food, but if it upsets your stomach take it with food. Take your medicine at regular intervals. Do not take your medicine more often than directed. Take all of your medicine as directed even if you think you are better. Do not skip doses or stop your medicine early.  Talk to your pediatrician regarding the use of this medicine in children. While this drug may be prescribed for selected conditions, precautions do apply.  Overdosage: If you think you have taken too much of this medicine contact a poison control center or emergency room at once.  NOTE: This medicine is only for you. Do not share this medicine with others.  What if I miss a dose?  If you miss a dose, take it as soon as you can. If it is almost time for your next dose, take only that dose. Do not take double or extra doses.  What may interact with this medicine?  · antacids  · barbiturates  · birth control pills  · bismuth subsalicylate  · carbamazepine  · methoxyflurane  · other antibiotics  · phenytoin  · vitamins that contain iron  · warfarin  This list may not describe all possible interactions.  Give your health care provider a list of all the medicines, herbs, non-prescription drugs, or dietary supplements you use. Also tell them if you smoke, drink alcohol, or use illegal drugs. Some items may interact with your medicine.  What should I watch for while using this medicine?  Tell your doctor or health care professional if your symptoms do not improve.  Do not treat diarrhea with over the counter products. Contact your doctor if you have diarrhea that lasts more than 2 days or if it is severe and watery.  Do not take this medicine just before going to bed. It may not dissolve properly when you lay down and can cause pain in your throat. Drink plenty of fluids while taking this medicine to also help reduce irritation in your throat.  This medicine can make you more sensitive to the sun. Keep out of the sun. If you cannot avoid being in the sun, wear protective clothing and use sunscreen. Do not use sun lamps or tanning beds/booths.  Birth control pills may not work properly while you are taking this medicine. Talk to your doctor about using an extra method of birth control.  If you are being treated for a sexually transmitted infection, avoid sexual contact until you have finished your treatment. Your sexual partner may also need treatment.  Avoid antacids, aluminum, calcium, magnesium, and iron products for 4 hours before and 2 hours after taking a dose of this medicine.  If you are using this medicine to prevent malaria, you should still protect yourself from contact with mosquitos. Stay in screened-in areas, use mosquito nets, keep your body covered, and use an insect repellent.  What side effects may I notice from receiving this medicine?  Side effects that you should report to your doctor or health care professional as soon as possible:  · allergic reactions like skin rash, itching or hives, swelling of the face, lips, or tongue  · difficulty breathing  · fever  · itching in the rectal or genital  area  · pain on swallowing  · rash, fever, and swollen lymph nodes  · redness, blistering, peeling or loosening of the skin, including inside the mouth  · severe stomach pain or cramps  · unusual bleeding or bruising  · unusually weak or tired  · yellowing of the eyes or skin  Side effects that usually do not require medical attention (report to your doctor or health care professional if they continue or are bothersome):  · diarrhea  · loss of appetite  · nausea, vomiting  This list may not describe all possible side effects. Call your doctor for medical advice about side effects. You may report side effects to FDA at 3-767-ZCQ-0346.  Where should I keep my medicine?  Keep out of the reach of children.  Store at room temperature, below 30 degrees C (86 degrees F). Protect from light. Keep container tightly closed. Throw away any unused medicine after the expiration date. Taking this medicine after the expiration date can make you seriously ill.  NOTE: This sheet is a summary. It may not cover all possible information. If you have questions about this medicine, talk to your doctor, pharmacist, or health care provider.  © 2021 [employer] (2020-03-19 13:44:53)  Prednisone tablets  What is this medicine?  PREDNISONE (PRED ni sone) is a corticosteroid. It is commonly used to treat inflammation of the skin, joints, lungs, and other organs. Common conditions treated include asthma, allergies, and arthritis. It is also used for other conditions, such as blood disorders and diseases of the adrenal glands.  This medicine may be used for other purposes; ask your health care provider or pharmacist if you have questions.  COMMON BRAND NAME(S): Deltasone, Predone, Sterapred, Sterapred DS  What should I tell my health care provider before I take this medicine?  They need to know if you have any of these conditions:  · Cushing's syndrome  · diabetes  · glaucoma  · heart disease  · high blood pressure  · infection  (especially a virus infection such as chickenpox, cold sores, or herpes)  · kidney disease  · liver disease  · mental illness  · myasthenia gravis  · osteoporosis  · seizures  · stomach or intestine problems  · thyroid disease  · an unusual or allergic reaction to lactose, prednisone, other medicines, foods, dyes, or preservatives  · pregnant or trying to get pregnant  · breast-feeding  How should I use this medicine?  Take this medicine by mouth with a glass of water. Follow the directions on the prescription label. Take this medicine with food. If you are taking this medicine once a day, take it in the morning. Do not take more medicine than you are told to take. Do not suddenly stop taking your medicine because you may develop a severe reaction. Your doctor will tell you how much medicine to take. If your doctor wants you to stop the medicine, the dose may be slowly lowered over time to avoid any side effects.  Talk to your pediatrician regarding the use of this medicine in children. Special care may be needed.  Overdosage: If you think you have taken too much of this medicine contact a poison control center or emergency room at once.  NOTE: This medicine is only for you. Do not share this medicine with others.  What if I miss a dose?  If you miss a dose, take it as soon as you can. If it is almost time for your next dose, talk to your doctor or health care professional. You may need to miss a dose or take an extra dose. Do not take double or extra doses without advice.  What may interact with this medicine?  Do not take this medicine with any of the following medications:  · metyrapone  · mifepristone  This medicine may also interact with the following medications:  · aminoglutethimide  · amphotericin B  · aspirin and aspirin-like medicines  · barbiturates  · certain medicines for diabetes, like glipizide or glyburide  · cholestyramine  · cholinesterase  inhibitors  · cyclosporine  · digoxin  · diuretics  · ephedrine  · female hormones, like estrogens and birth control pills  · isoniazid  · ketoconazole  · NSAIDS, medicines for pain and inflammation, like ibuprofen or naproxen  · phenytoin  · rifampin  · toxoids  · vaccines  · warfarin  This list may not describe all possible interactions. Give your health care provider a list of all the medicines, herbs, non-prescription drugs, or dietary supplements you use. Also tell them if you smoke, drink alcohol, or use illegal drugs. Some items may interact with your medicine.  What should I watch for while using this medicine?  Visit your doctor or health care professional for regular checks on your progress. If you are taking this medicine over a prolonged period, carry an identification card with your name and address, the type and dose of your medicine, and your doctor's name and address.  This medicine may increase your risk of getting an infection. Tell your doctor or health care professional if you are around anyone with measles or chickenpox, or if you develop sores or blisters that do not heal properly.  If you are going to have surgery, tell your doctor or health care professional that you have taken this medicine within the last twelve months.  Ask your doctor or health care professional about your diet. You may need to lower the amount of salt you eat.  This medicine may increase blood sugar. Ask your healthcare provider if changes in diet or medicines are needed if you have diabetes.  What side effects may I notice from receiving this medicine?  Side effects that you should report to your doctor or health care professional as soon as possible:  · allergic reactions like skin rash, itching or hives, swelling of the face, lips, or tongue  · changes in emotions or moods  · changes in vision  · depressed mood  · eye pain  · fever or chills, cough, sore throat, pain or difficulty passing urine  · signs and symptoms  of high blood sugar such as being more thirsty or hungry or having to urinate more than normal. You may also feel very tired or have blurry vision.  · swelling of ankles, feet  Side effects that usually do not require medical attention (report to your doctor or health care professional if they continue or are bothersome):  · confusion, excitement, restlessness  · headache  · nausea, vomiting  · skin problems, acne, thin and shiny skin  · trouble sleeping  · weight gain  This list may not describe all possible side effects. Call your doctor for medical advice about side effects. You may report side effects to FDA at 8-108-EGV-7055.  Where should I keep my medicine?  Keep out of the reach of children.  Store at room temperature between 15 and 30 degrees C (59 and 86 degrees F). Protect from light. Keep container tightly closed. Throw away any unused medicine after the expiration date.  NOTE: This sheet is a summary. It may not cover all possible information. If you have questions about this medicine, talk to your doctor, pharmacist, or health care provider.  © 2021 [employer] (2019-09-17 10:54:22)

## 2021-10-11 DIAGNOSIS — J30.89 OTHER ALLERGIC RHINITIS: ICD-10-CM

## 2021-10-11 DIAGNOSIS — J45.40 MODERATE PERSISTENT ASTHMA WITHOUT COMPLICATION: ICD-10-CM

## 2021-10-11 RX ORDER — CETIRIZINE HYDROCHLORIDE 10 MG/1
TABLET ORAL
Qty: 30 TABLET | Refills: 0 | Status: SHIPPED | OUTPATIENT
Start: 2021-10-11 | End: 2021-12-09 | Stop reason: SDUPTHER

## 2021-10-11 NOTE — TELEPHONE ENCOUNTER
Hasn't seen wilda at her new practice but did not want to schedule an apt with someone where. Said she would call back.

## 2021-10-20 ENCOUNTER — TELEMEDICINE (OUTPATIENT)
Dept: FAMILY MEDICINE CLINIC | Facility: TELEHEALTH | Age: 36
End: 2021-10-20

## 2021-10-20 DIAGNOSIS — R39.89 SUSPECTED UTI: Primary | ICD-10-CM

## 2021-10-20 PROCEDURE — 99213 OFFICE O/P EST LOW 20 MIN: CPT | Performed by: NURSE PRACTITIONER

## 2021-10-20 RX ORDER — PHENAZOPYRIDINE HYDROCHLORIDE 200 MG/1
200 TABLET, FILM COATED ORAL 3 TIMES DAILY PRN
Qty: 6 TABLET | Refills: 0 | Status: SHIPPED | OUTPATIENT
Start: 2021-10-20 | End: 2021-10-22

## 2021-10-20 RX ORDER — NITROFURANTOIN 25; 75 MG/1; MG/1
100 CAPSULE ORAL 2 TIMES DAILY
Qty: 14 CAPSULE | Refills: 0 | Status: SHIPPED | OUTPATIENT
Start: 2021-10-20 | End: 2021-10-27

## 2021-10-20 NOTE — PROGRESS NOTES
You have chosen to receive care through a telehealth visit.  Do you consent to use a video/audio connection for your medical care today? Yes     CHIEF COMPLAINT  Chief Complaint   Patient presents with   • Urinary Tract Infection         HPI  Zhanna Veras is a 35 y.o. female  presents with complaint of 2 day history of strong-smelling urine, frequency, urgency, dysuria described as burning.  Denies fever/chills, nausea/vomiting, belly/back pain, hematuria, or vaginal symptoms.  She has had similar symptoms in the past which resolved with antibiotic treatment for UTI.    Review of Systems   Constitutional: Negative for fever.   Gastrointestinal: Negative for abdominal pain, nausea and vomiting.   Genitourinary: Positive for dysuria, frequency and urgency. Negative for flank pain, hematuria and vaginal discharge.   Musculoskeletal: Negative for back pain.   All other systems reviewed and are negative.      Past Medical History:   Diagnosis Date   • Abdominal pain    • ADHD (attention deficit hyperactivity disorder) 2017   • Allergic 2018   • Allergic rhinitis    • Anemia 2020   • Anxiety 9/7/16   • Arthritis     osteo   • Asthma    • Cough    • Depression    • Ectopic pregnancy    • Headache 2019   • History of transfusion    • HPV (human papilloma virus) infection    • Malaise and fatigue    • Respiratory abnormality    • Urinary tract infection    • Visual impairment 8/1/95    Have had glasses since 4th grade   • Vitamin D deficiency        Family History   Problem Relation Age of Onset   • Asthma Mother    • COPD Mother    • Diabetes Mother    • Hypertension Mother    • Anxiety disorder Mother    • Depression Mother    • Hyperlipidemia Mother    • Alcohol abuse Father    • Cancer Father    • Arthritis Paternal Grandmother    • Cancer Paternal Grandmother    • COPD Paternal Grandmother    • Stroke Paternal Grandmother    • Asthma Paternal Grandmother    • Miscarriages / Stillbirths Paternal Grandmother          Last child was stillborn   • Learning disabilities Son         ADHD/ODD   • Learning disabilities Son         ADHD       Social History     Socioeconomic History   • Marital status:    Tobacco Use   • Smoking status: Former Smoker     Packs/day: 0.25     Years: 1.00     Pack years: 0.25     Types: Cigarettes     Start date: 6/1/2005     Quit date: 2006     Years since quitting: 15.3   • Smokeless tobacco: Never Used   Substance and Sexual Activity   • Alcohol use: No   • Drug use: No   • Sexual activity: Yes     Partners: Male     Birth control/protection: Abstinence, Coitus interruptus, Condom         There were no vitals taken for this visit.    PHYSICAL EXAM  Physical Exam   Constitutional: She is oriented to person, place, and time. She appears well-developed and well-nourished. She does not have a sickly appearance. She does not appear ill.   HENT:   Head: Normocephalic and atraumatic.   Pulmonary/Chest: Effort normal.  No respiratory distress.  Neurological: She is alert and oriented to person, place, and time.         Diagnoses and all orders for this visit:    1. Suspected UTI (Primary)  -     nitrofurantoin, macrocrystal-monohydrate, (MACROBID) 100 MG capsule; Take 1 capsule by mouth 2 (Two) Times a Day for 7 days.  Dispense: 14 capsule; Refill: 0  -     phenazopyridine (PYRIDIUM) 200 MG tablet; Take 1 tablet by mouth 3 (Three) Times a Day As Needed for Bladder Spasms for up to 2 days.  Dispense: 6 tablet; Refill: 0    -Macrobid as prescribed - complete entire course of medication even if you begin to feel better.   --Phenazopyridine is for painful urination and bladder spasms--this medication with cause urine to become bright orange and can stain undergarments.    -Continue to increase your fluid intake.   -Abstain from intercourse during antibiotic treatment.   -Practice good perineal hygiene: wipe front to back  -Do not hold your urine- go to the bathroom every 2-3 hours.     -Warning signs:  severe abdominal/pelvic/back pain, fever >101, blood in urine - seek medical attention as soon as possible for a hands on/objective exam and possible labs.     -Follow up with your PCP in 2 days if no improvement in symptoms or if symptoms begin to worsen.         FOLLOW-UP  As discussed during visit with PCP/[facility] if no improvement or Urgent Care/Emergency Department if worsening of symptoms    Patient verbalizes understanding of medication dosage, comfort measures, instructions for treatment and follow-up.    Madhavi Napier, NABEEL  10/20/2021  11:55 EDT    This visit was performed via Telehealth.  This patient has been instructed to follow-up with their primary care provider if their symptoms worsen or the treatment provided does not resolve their illness.

## 2021-10-20 NOTE — PATIENT INSTRUCTIONS
Urinary Tract Infection, Adult    A urinary tract infection (UTI) is an infection of any part of the urinary tract. The urinary tract includes the kidneys, ureters, bladder, and urethra. These organs make, store, and get rid of urine in the body.  Your health care provider may use other names to describe the infection. An upper UTI affects the ureters and kidneys (pyelonephritis). A lower UTI affects the bladder (cystitis) and urethra (urethritis).  What are the causes?  Most urinary tract infections are caused by bacteria in your genital area, around the entrance to your urinary tract (urethra). These bacteria grow and cause inflammation of your urinary tract.  What increases the risk?  You are more likely to develop this condition if:  · You have a urinary catheter that stays in place (indwelling).  · You are not able to control when you urinate or have a bowel movement (you have incontinence).  · You are female and you:  ? Use a spermicide or diaphragm for birth control.  ? Have low estrogen levels.  ? Are pregnant.  · You have certain genes that increase your risk (genetics).  · You are sexually active.  · You take antibiotic medicines.  · You have a condition that causes your flow of urine to slow down, such as:  ? An enlarged prostate, if you are male.  ? Blockage in your urethra (stricture).  ? A kidney stone.  ? A nerve condition that affects your bladder control (neurogenic bladder).  ? Not getting enough to drink, or not urinating often.  · You have certain medical conditions, such as:  ? Diabetes.  ? A weak disease-fighting system (immunesystem).  ? Sickle cell disease.  ? Gout.  ? Spinal cord injury.  What are the signs or symptoms?  Symptoms of this condition include:  · Needing to urinate right away (urgently).  · Frequent urination or passing small amounts of urine frequently.  · Pain or burning with urination.  · Blood in the urine.  · Urine that smells bad or unusual.  · Trouble urinating.  · Cloudy  urine.  · Vaginal discharge, if you are female.  · Pain in the abdomen or the lower back.  You may also have:  · Vomiting or a decreased appetite.  · Confusion.  · Irritability or tiredness.  · A fever.  · Diarrhea.  The first symptom in older adults may be confusion. In some cases, they may not have any symptoms until the infection has worsened.  How is this diagnosed?  This condition is diagnosed based on your medical history and a physical exam. You may also have other tests, including:  · Urine tests.  · Blood tests.  · Tests for sexually transmitted infections (STIs).  If you have had more than one UTI, a cystoscopy or imaging studies may be done to determine the cause of the infections.  How is this treated?  Treatment for this condition includes:  · Antibiotic medicine.  · Over-the-counter medicines to treat discomfort.  · Drinking enough water to stay hydrated.  If you have frequent infections or have other conditions such as a kidney stone, you may need to see a health care provider who specializes in the urinary tract (urologist).  In rare cases, urinary tract infections can cause sepsis. Sepsis is a life-threatening condition that occurs when the body responds to an infection. Sepsis is treated in the hospital with IV antibiotics, fluids, and other medicines.  Follow these instructions at home:    Medicines  · Take over-the-counter and prescription medicines only as told by your health care provider.  · If you were prescribed an antibiotic medicine, take it as told by your health care provider. Do not stop using the antibiotic even if you start to feel better.  General instructions  · Make sure you:  ? Empty your bladder often and completely. Do not hold urine for long periods of time.  ? Empty your bladder after sex.  ? Wipe from front to back after a bowel movement if you are female. Use each tissue one time when you wipe.  · Drink enough fluid to keep your urine pale yellow.  · Keep all follow-up  visits as told by your health care provider. This is important.  Contact a health care provider if:  · Your symptoms do not get better after 1-2 days.  · Your symptoms go away and then return.  Get help right away if you have:  · Severe pain in your back or your lower abdomen.  · A fever.  · Nausea or vomiting.  Summary  · A urinary tract infection (UTI) is an infection of any part of the urinary tract, which includes the kidneys, ureters, bladder, and urethra.  · Most urinary tract infections are caused by bacteria in your genital area, around the entrance to your urinary tract (urethra).  · Treatment for this condition often includes antibiotic medicines.  · If you were prescribed an antibiotic medicine, take it as told by your health care provider. Do not stop using the antibiotic even if you start to feel better.  · Keep all follow-up visits as told by your health care provider. This is important.  This information is not intended to replace advice given to you by your health care provider. Make sure you discuss any questions you have with your health care provider.  Document Revised: 12/05/2019 Document Reviewed: 06/27/2019  [employer] Patient Education © 2021 [employer]

## 2021-12-09 ENCOUNTER — OFFICE VISIT (OUTPATIENT)
Dept: FAMILY MEDICINE CLINIC | Facility: CLINIC | Age: 36
End: 2021-12-09

## 2021-12-09 VITALS
SYSTOLIC BLOOD PRESSURE: 95 MMHG | DIASTOLIC BLOOD PRESSURE: 58 MMHG | BODY MASS INDEX: 26.12 KG/M2 | WEIGHT: 153 LBS | RESPIRATION RATE: 12 BRPM | HEART RATE: 87 BPM | TEMPERATURE: 97.2 F | OXYGEN SATURATION: 99 % | HEIGHT: 64 IN

## 2021-12-09 DIAGNOSIS — F41.1 GENERALIZED ANXIETY DISORDER: ICD-10-CM

## 2021-12-09 DIAGNOSIS — Z00.00 HEALTHCARE MAINTENANCE: ICD-10-CM

## 2021-12-09 DIAGNOSIS — J30.9 CHRONIC ALLERGIC RHINITIS: Primary | ICD-10-CM

## 2021-12-09 DIAGNOSIS — E55.9 VITAMIN D DEFICIENCY: ICD-10-CM

## 2021-12-09 DIAGNOSIS — D50.8 OTHER IRON DEFICIENCY ANEMIA: ICD-10-CM

## 2021-12-09 DIAGNOSIS — Z23 ENCOUNTER FOR IMMUNIZATION: ICD-10-CM

## 2021-12-09 DIAGNOSIS — J45.30 MILD PERSISTENT ASTHMA WITHOUT COMPLICATION: ICD-10-CM

## 2021-12-09 DIAGNOSIS — J30.89 OTHER ALLERGIC RHINITIS: ICD-10-CM

## 2021-12-09 DIAGNOSIS — J45.40 MODERATE PERSISTENT ASTHMA WITHOUT COMPLICATION: ICD-10-CM

## 2021-12-09 DIAGNOSIS — E78.2 MIXED HYPERLIPIDEMIA: ICD-10-CM

## 2021-12-09 PROBLEM — R53.82 CHRONIC FATIGUE: Status: RESOLVED | Noted: 2017-08-31 | Resolved: 2021-12-09

## 2021-12-09 PROBLEM — M25.511 ACUTE PAIN OF RIGHT SHOULDER: Status: RESOLVED | Noted: 2019-02-28 | Resolved: 2021-12-09

## 2021-12-09 PROBLEM — R79.89 ABNORMAL BUN-TO-CREATININE RATIO: Status: RESOLVED | Noted: 2020-02-16 | Resolved: 2021-12-09

## 2021-12-09 PROBLEM — Z91.09 MULTIPLE ENVIRONMENTAL ALLERGIES: Status: RESOLVED | Noted: 2019-08-15 | Resolved: 2021-12-09

## 2021-12-09 PROBLEM — M51.369 DEGENERATIVE DISC DISEASE, LUMBAR: Status: RESOLVED | Noted: 2019-06-13 | Resolved: 2021-12-09

## 2021-12-09 PROBLEM — M51.36 DEGENERATIVE DISC DISEASE, LUMBAR: Status: RESOLVED | Noted: 2019-06-13 | Resolved: 2021-12-09

## 2021-12-09 PROBLEM — G89.29 CHRONIC BILATERAL LOW BACK PAIN WITH LEFT-SIDED SCIATICA: Status: RESOLVED | Noted: 2017-04-04 | Resolved: 2021-12-09

## 2021-12-09 PROBLEM — M62.830 MUSCLE SPASM OF BACK: Status: RESOLVED | Noted: 2017-04-04 | Resolved: 2021-12-09

## 2021-12-09 PROBLEM — M54.42 CHRONIC BILATERAL LOW BACK PAIN WITH LEFT-SIDED SCIATICA: Status: RESOLVED | Noted: 2017-04-04 | Resolved: 2021-12-09

## 2021-12-09 PROBLEM — D50.9 IRON DEFICIENCY ANEMIA: Status: ACTIVE | Noted: 2020-02-16

## 2021-12-09 PROBLEM — R00.2 INTERMITTENT PALPITATIONS: Status: RESOLVED | Noted: 2018-02-22 | Resolved: 2021-12-09

## 2021-12-09 PROCEDURE — 90686 IIV4 VACC NO PRSV 0.5 ML IM: CPT | Performed by: GENERAL PRACTICE

## 2021-12-09 PROCEDURE — 3008F BODY MASS INDEX DOCD: CPT | Performed by: GENERAL PRACTICE

## 2021-12-09 PROCEDURE — 2014F MENTAL STATUS ASSESS: CPT | Performed by: GENERAL PRACTICE

## 2021-12-09 PROCEDURE — 90471 IMMUNIZATION ADMIN: CPT | Performed by: GENERAL PRACTICE

## 2021-12-09 PROCEDURE — 99395 PREV VISIT EST AGE 18-39: CPT | Performed by: GENERAL PRACTICE

## 2021-12-09 RX ORDER — BUSPIRONE HYDROCHLORIDE 10 MG/1
TABLET ORAL
Qty: 60 TABLET | Refills: 5 | Status: SHIPPED | OUTPATIENT
Start: 2021-12-09 | End: 2022-06-10 | Stop reason: SDUPTHER

## 2021-12-09 RX ORDER — CETIRIZINE HYDROCHLORIDE 10 MG/1
10 TABLET ORAL NIGHTLY
Qty: 30 TABLET | Refills: 5 | Status: SHIPPED | OUTPATIENT
Start: 2021-12-09 | End: 2022-06-10 | Stop reason: SDUPTHER

## 2021-12-09 RX ORDER — ALBUTEROL SULFATE 90 UG/1
2 AEROSOL, METERED RESPIRATORY (INHALATION) EVERY 4 HOURS PRN
Qty: 18 G | Refills: 5 | Status: SHIPPED | OUTPATIENT
Start: 2021-12-09 | End: 2022-06-10 | Stop reason: SDUPTHER

## 2021-12-09 RX ORDER — ESCITALOPRAM OXALATE 20 MG/1
20 TABLET ORAL DAILY
Qty: 30 TABLET | Refills: 5 | Status: SHIPPED | OUTPATIENT
Start: 2021-12-09 | End: 2022-04-28 | Stop reason: SDUPTHER

## 2021-12-09 RX ORDER — MONTELUKAST SODIUM 10 MG/1
10 TABLET ORAL NIGHTLY
Qty: 30 TABLET | Refills: 5 | Status: SHIPPED | OUTPATIENT
Start: 2021-12-09 | End: 2022-06-10 | Stop reason: SDUPTHER

## 2021-12-09 NOTE — PATIENT INSTRUCTIONS
Advance Directive    Advance directives are legal documents that let you make choices ahead of time about your health care and medical treatment in case you become unable to communicate for yourself. Advance directives are a way for you to make known your wishes to family, friends, and health care providers. This can let others know about your end-of-life care if you become unable to communicate.  Discussing and writing advance directives should happen over time rather than all at once. Advance directives can be changed depending on your situation and what you want, even after you have signed the advance directives.  There are different types of advance directives, such as:  · Medical power of .  · Living will.  · Do not resuscitate (DNR) or do not attempt resuscitation (DNAR) order.  Health care proxy and medical power of   A health care proxy is also called a health care agent. This is a person who is appointed to make medical decisions for you in cases where you are unable to make the decisions yourself. Generally, people choose someone they know well and trust to represent their preferences. Make sure to ask this person for an agreement to act as your proxy. A proxy may have to exercise judgment in the event of a medical decision for which your wishes are not known.  A medical power of  is a legal document that names your health care proxy. Depending on the laws in your state, after the document is written, it may also need to be:  · Signed.  · Notarized.  · Dated.  · Copied.  · Witnessed.  · Incorporated into your medical record.  You may also want to appoint someone to manage your money in a situation in which you are unable to do so. This is called a durable power of  for finances. It is a separate legal document from the durable power of  for health care. You may choose the same person or someone different from your health care proxy to act as your agent in money  matters.  If you do not appoint a proxy, or if there is a concern that the proxy is not acting in your best interests, a court may appoint a guardian to act on your behalf.  Living will  A living will is a set of instructions that state your wishes about medical care when you cannot express them yourself. Health care providers should keep a copy of your living will in your medical record. You may want to give a copy to family members or friends. To alert caregivers in case of an emergency, you can place a card in your wallet to let them know that you have a living will and where they can find it. A living will is used if you become:  · Terminally ill.  · Disabled.  · Unable to communicate or make decisions.  Items to consider in your living will include:  · To use or not to use life-support equipment, such as dialysis machines and breathing machines (ventilators).  · A DNR or DNAR order. This tells health care providers not to use cardiopulmonary resuscitation (CPR) if breathing or heartbeat stops.  · To use or not to use tube feeding.  · To be given or not to be given food and fluids.  · Comfort (palliative) care when the goal becomes comfort rather than a cure.  · Donation of organs and tissues.  A living will does not give instructions for distributing your money and property if you should pass away.  DNR or DNAR  A DNR or DNAR order is a request not to have CPR in the event that your heart stops beating or you stop breathing. If a DNR or DNAR order has not been made and shared, a health care provider will try to help any patient whose heart has stopped or who has stopped breathing. If you plan to have surgery, talk with your health care provider about how your DNR or DNAR order will be followed if problems occur.  What if I do not have an advance directive?  If you do not have an advance directive, some states assign family decision makers to act on your behalf based on how closely you are related to them. Each  state has its own laws about advance directives. You may want to check with your health care provider, , or state representative about the laws in your state.  Summary  · Advance directives are the legal documents that allow you to make choices ahead of time about your health care and medical treatment in case you become unable to tell others about your care.  · The process of discussing and writing advance directives should happen over time. You can change the advance directives, even after you have signed them.  · Advance directives include DNR or DNAR orders, living dubon, and designating an agent as your medical power of .  This information is not intended to replace advice given to you by your health care provider. Make sure you discuss any questions you have with your health care provider.  Document Revised: 01/28/2021 Document Reviewed: 07/16/2020  [employer] Patient Education © 2021 [employer]

## 2021-12-09 NOTE — PROGRESS NOTES
Injection  Injection performed in left deltoid by Carey Skelton MA. Patient tolerated the procedure well without complications.  12/09/21   Carey Skelton MA

## 2021-12-09 NOTE — PROGRESS NOTES
Chief Complaint  Annual Wellness Visit    Subjective    History of Present Illness:  Zhanna Veras is a 36 y.o. female who presents for an annual wellness visit    Chronic Anxiety  She continues to struggle with intermittent nervousness, worrying, and difficulty sleeping.  When severe the symptoms are occasionally associated with mild depression.  She remains under a considerable amount of stress.  She is working full-time while her  attends osteopathic school and they have 4 children.  Both her parents  within the last several years.  She is taking escitalopram and feels that it has helped some.  Today, she has not experienced any side effects    Chronic Allergic Rhinitis  She has a long history of intermittent sneezing, rhinorrhea, nasal congestion, and postnasal drip.  There is no history of any other upper respiratory tract symptoms.  She is currently on cetirizine with good effect    Asthma  She has a long history of intermittent cough, shortness of breath, and wheezing.  There is no history of any chest pain or hemoptysis.  With montelukast she is using her albuterol once or twice weekly at the most.    The following portions of the patient's history were reviewed and   updated as appropriate: allergies, current medications, past family history, past medical history, past social history, past surgical history and problem list.    Compared to one year ago, the patient feels her physical   health is the same.    Compared to one year ago, the patient feels her mental   health is the same.    Recent Hospitalizations:  She was not admitted to the hospital during the last year.     Current Medical Providers:  Patient Care Team:  Sahil Harrington MD as PCP - General (Family Medicine)    Outpatient Medications Prior to Visit   Medication Sig Dispense Refill   • albuterol sulfate  (90 Base) MCG/ACT inhaler Inhale 2 puffs Every 4 (Four) Hours As Needed for Shortness of Air. 18 g 5   •  cetirizine (zyrTEC) 10 MG tablet TAKE 1 TABLET BY MOUTH ONCE DAILY AT NIGHT 30 tablet 0   • escitalopram (LEXAPRO) 20 MG tablet Take 1 tablet by mouth Daily. 30 tablet 5   • ferrous sulfate 325 (65 FE) MG tablet Take 1 tablet by mouth 2 (two) times a day. 60 tablet 5   • fluticasone (Flonase) 50 MCG/ACT nasal spray Administer 2 sprays both nostrils once daily 1 bottle 5   • montelukast (SINGULAIR) 10 MG tablet Take 1 tablet by mouth Every Night. 30 tablet 5   • vitamin D (ERGOCALCIFEROL) 1.25 MG (16099 UT) capsule capsule Take 1 capsule by mouth 1 (One) Time Per Week. 5 capsule 5     No facility-administered medications prior to visit.     No opioid medication identified on active medication list. I have reviewed chart for other potential  high risk medication/s and harmful drug interactions in the elderly.          Aspirin is not on active medication list.  Aspirin use is not indicated based on review of current medical condition/s. Risk of harm outweighs potential benefits.  .    Patient Active Problem List   Diagnosis   • Moderate persistent asthma without complication   • Chronic allergic rhinitis   • Vitamin D deficiency   • Generalized anxiety disorder   • Restless leg syndrome   • Mixed hyperlipidemia   • DDD (degenerative disc disease), lumbar   • Migraine without aura and without status migrainosus, not intractable   • Iron deficiency anemia   • Encounter for immunization     Advance Care Planning  Advance Directive is not on file.    Review of Systems   Constitutional: Positive for fatigue. Negative for appetite change, chills, diaphoresis and fever.   HENT: Positive for rhinorrhea and sneezing. Negative for congestion, ear pain, postnasal drip, sinus pressure, sore throat, tinnitus and voice change.    Eyes: Negative for visual disturbance.   Respiratory: Negative for cough, shortness of breath and wheezing.    Cardiovascular: Negative for chest pain, palpitations and leg swelling.   Gastrointestinal:  "Negative for abdominal pain, blood in stool, constipation, diarrhea, nausea and vomiting.   Endocrine: Negative for polydipsia and polyuria.   Genitourinary: Negative for dysuria, frequency, hematuria and urgency.   Musculoskeletal: Negative for arthralgias, back pain, joint swelling and myalgias.   Skin: Negative for rash.   Neurological: Negative for weakness, numbness and confusion.   Psychiatric/Behavioral: Negative for decreased concentration, dysphoric mood, sleep disturbance and suicidal ideas. The patient is nervous/anxious.         Objective    Vitals:    12/09/21 1423   BP: 95/58   Pulse: 87   Resp: 12   Temp: 97.2 °F (36.2 °C)   TempSrc: Temporal   SpO2: 99%   Weight: 69.4 kg (153 lb)   Height: 162.6 cm (64.02\")     BMI Readings from Last 1 Encounters:   12/09/21 26.25 kg/m²   BMI is above normal parameters. Recommendations include: exercise counseling and nutrition counseling    Does the patient have evidence of cognitive impairment? No    Physical Exam  Constitutional:       General: She is not in acute distress.     Appearance: Normal appearance. She is well-developed. She is not diaphoretic.      Comments: Bright and in good spirits. No apparent distress. No pallor, jaundice, diaphoresis, or cyanosis.   HENT:      Head: Atraumatic.      Right Ear: Tympanic membrane, ear canal and external ear normal.      Left Ear: Tympanic membrane, ear canal and external ear normal.   Eyes:      Conjunctiva/sclera: Conjunctivae normal.   Neck:      Thyroid: No thyroid mass or thyromegaly.      Vascular: No carotid bruit or JVD.      Trachea: Trachea normal. No tracheal deviation.   Cardiovascular:      Rate and Rhythm: Normal rate and regular rhythm.      Heart sounds: Normal heart sounds, S1 normal and S2 normal. No murmur heard.  No gallop.    Pulmonary:      Effort: Pulmonary effort is normal.      Breath sounds: Normal breath sounds.   Chest:   Breasts:      Right: No supraclavicular adenopathy.      Left: No " supraclavicular adenopathy.       Abdominal:      General: Bowel sounds are normal. There is no distension or abdominal bruit.      Palpations: Abdomen is soft. There is no hepatomegaly, splenomegaly or mass.      Tenderness: There is no abdominal tenderness.      Hernia: No hernia is present.   Musculoskeletal:      Right lower leg: No edema.      Left lower leg: No edema.   Lymphadenopathy:      Head:      Right side of head: No submental, submandibular, tonsillar, preauricular, posterior auricular or occipital adenopathy.      Left side of head: No submental, submandibular, tonsillar, preauricular, posterior auricular or occipital adenopathy.      Cervical: No cervical adenopathy.      Upper Body:      Right upper body: No supraclavicular adenopathy.      Left upper body: No supraclavicular adenopathy.   Skin:     General: Skin is warm.      Coloration: Skin is not cyanotic, jaundiced or pale.      Findings: No rash.      Nails: There is no clubbing.   Neurological:      Mental Status: She is alert and oriented to person, place, and time.      Cranial Nerves: No cranial nerve deficit.      Motor: No tremor.      Coordination: Coordination normal.      Gait: Gait normal.   Psychiatric:         Attention and Perception: Attention normal.         Mood and Affect: Mood normal.         Speech: Speech normal.         Behavior: Behavior normal.         Thought Content: Thought content normal.       HEALTH RISK ASSESSMENT    Smoking Status:  Social History     Tobacco Use   Smoking Status Former Smoker   • Packs/day: 0.25   • Years: 1.00   • Pack years: 0.25   • Types: Cigarettes   • Start date: 6/1/2005   • Quit date: 2006   • Years since quitting: 15.5   Smokeless Tobacco Never Used     Alcohol Consumption:  Social History     Substance and Sexual Activity   Alcohol Use No     Fall Risk Screen:    [facility]    Depression Screening:  PHQ-2/PHQ-9 Depression Screening 12/9/2021    Little interest or pleasure in doing things 0   Feeling down, depressed, or hopeless 0   Trouble falling or staying asleep, or sleeping too much 0   Feeling tired or having little energy 0   Poor appetite or overeating 0   Feeling bad about yourself - or that you are a failure or have let yourself or your family down 0   Trouble concentrating on things, such as reading the newspaper or watching television 0   Moving or speaking so slowly that other people could have noticed. Or the opposite - being so fidgety or restless that you have been moving around a lot more than usual 0   Thoughts that you would be better off dead, or of hurting yourself in some way 0   Total Score 0   If you checked off any problems, how difficult have these problems made it for you to do your work, take care of things at home, or get along with other people? Not difficult at all       Health Habits and Functional and Cognitive Screening:  Functional & Cognitive Status 12/9/2021   Do you have difficulty preparing food and eating? No   Do you have difficulty bathing yourself, getting dressed or grooming yourself? No   Do you have difficulty using the toilet? No   Do you have difficulty moving around from place to place? No   Do you have trouble with steps or getting out of a bed or a chair? No   Current Diet Well Balanced Diet   Dental Exam Not up to date   Eye Exam Not up to date   Exercise (times per week) 7 times per week   Current Exercises Include Walking   Do you need help using the phone?  No   Are you deaf or do you have serious difficulty hearing?  No   Do you need help with transportation? No   Do you need help shopping? No   Do you need help preparing meals?  No   Do you need help with housework?  No   Do you need help with laundry? No   Do you need help taking your medications? No   Do you need help managing money? No   Do you ever drive or ride in a car without wearing a seat belt? No   Have you felt unusual stress, anger or  loneliness in the last month? No   Who do you live with? Spouse   If you need help, do you have trouble finding someone available to you? No   Have you been bothered in the last four weeks by sexual problems? No   Do you have difficulty concentrating, remembering or making decisions? No       Age-appropriate Screening Schedule:  Refer to the list below for future screening recommendations based on patient's age, sex and/or medical conditions. Orders for these recommended tests are listed in the plan section. The patient has been provided with a written plan.    Health Maintenance   Topic Date Due   • DXA SCAN  Never done   • PAP SMEAR  09/10/2016   • LIPID PANEL  08/17/2021   • TDAP/TD VACCINES (2 - Td or Tdap) 02/19/2026   • INFLUENZA VACCINE  Completed              Assessment/Plan   Risk Factors Identified During Encounter  Depression/Dysphoria  Immunizations Discussed/Encouraged (specific Immunizations; Influenza and COVID19  The above risks/problems have been discussed with the patient.  Follow up actions/plans if indicated are seen below in the Assessment/Plan Section.  Pertinent information has been shared with the patient in the After Visit Summary.    Diagnoses and all orders for this visit:    1. Chronic allergic rhinitis   Reminded regarding allergen avoidance.  Continue current medication    2. Mild persistent asthma without complication  Mild persistent asthma. The patient is not currently having an exacerbation. In general, the patient's disease is well controlled.  Encouraged to report if this should change.  Continue current medication  -     albuterol sulfate  (90 Base) MCG/ACT inhaler; Inhale 2 puffs Every 4 (Four) Hours As Needed for Shortness of Air.  Dispense: 18 g; Refill: 5  -     cetirizine (zyrTEC) 10 MG tablet; Take 1 tablet by mouth Every Night.  Dispense: 30 tablet; Refill: 5  -     montelukast (SINGULAIR) 10 MG tablet; Take 1 tablet by mouth Every Night.  Dispense: 30 tablet;  Refill: 5    3.  Mixed hyperlipidemia  Encouraged to continue to work on her diet and exercise plan.  Scheduled for updated labs  -     Comprehensive Metabolic Panel; Future  -     Lipid Panel; Future  -     TSH; Future    4. Vitamin D deficiency  Will continue to monitor  -     Vitamin D 25 Hydroxy; Future    5. Generalized anxiety disorder  Significant situational component.   Supportive therapy.   Reviewed options and agreed on the addition of buspirone 5 twice daily titrating to 10 twice daily  Encouraged to report if any worse or if any new symptoms or concerns.  -     escitalopram (LEXAPRO) 20 MG tablet; Take 1 tablet by mouth Daily.  Dispense: 30 tablet; Refill: 5  -     busPIRone (BUSPAR) 10 MG tablet; 1/2 po bid for one week, then 1 po bid afterward  Dispense: 60 tablet; Refill: 5  -     TSH; Future    6. Other iron deficiency anemia  Will continue to monitor  -     CBC & Differential; Future  -     Vitamin B12; Future  -     Iron; Future  -     Transferrin; Future  -     Ferritin; Future    7. Healthcare maintenance  Flu shot administered.  Recommended a third dose of the Moderna COVID-19 vaccine.  We will discuss a Pneumovax 23 at her return  -     FluLaval/Fluarix/Fluzone >6 Months    Follow Up:   Return in about 6 months (around 6/9/2022) for Fasting labs at convenience.     An After Visit Summary and PPPS were made available to the patient.

## 2022-02-11 ENCOUNTER — OFFICE VISIT (OUTPATIENT)
Dept: FAMILY MEDICINE CLINIC | Facility: CLINIC | Age: 37
End: 2022-02-11

## 2022-02-11 DIAGNOSIS — S92.912G: Primary | ICD-10-CM

## 2022-02-11 PROCEDURE — 99213 OFFICE O/P EST LOW 20 MIN: CPT | Performed by: NURSE PRACTITIONER

## 2022-02-11 NOTE — PROGRESS NOTES
Zhanna Veras is a 36 y.o. female who presents to the clinic today c/o fractures of her 4th and 5th toes which occurred two weeks ago at her home and she has continued edema. A piece of panel fell on her left toes two weeks ago. Due to pain, she went to the [facility]. There she had an xray of her left foot and was told the 4th and 5th had fractures. She was to go to an Orthopedist appt but was unable to do so. Her First Care encounter has been requested.      Foot Injury   The incident occurred more than 1 week ago. The incident occurred at home. The injury mechanism was a direct blow. The pain is present in the left foot. The quality of the pain is described as aching. The pain is moderate. Associated symptoms include a loss of motion. The symptoms are aggravated by movement and weight bearing. She has tried elevation, immobilization, rest, NSAIDs and ice for the symptoms. The treatment provided mild relief.     The following portions of the patient's history were reviewed and updated as appropriate: allergies, current medications, past family history, past medical history, past social history, past surgical history and problem list.    Current Outpatient Medications:   •  albuterol sulfate  (90 Base) MCG/ACT inhaler, Inhale 2 puffs Every 4 (Four) Hours As Needed for Shortness of Air., Disp: 18 g, Rfl: 5  •  busPIRone (BUSPAR) 10 MG tablet, 1/2 po bid for one week, then 1 po bid afterward, Disp: 60 tablet, Rfl: 5  •  cetirizine (zyrTEC) 10 MG tablet, Take 1 tablet by mouth Every Night., Disp: 30 tablet, Rfl: 5  •  escitalopram (LEXAPRO) 20 MG tablet, Take 1 tablet by mouth Daily., Disp: 30 tablet, Rfl: 5  •  montelukast (SINGULAIR) 10 MG tablet, Take 1 tablet by mouth Every Night., Disp: 30 tablet, Rfl: 5    Allergies   Allergen Reactions   • Penicillins Unknown (See Comments)     PT'S MOTHER TOLD HER SHE WAS ALLERGIC   • Aloe Vera Rash     Review of Systems   Constitutional: Positive for  "activity change and fatigue.   Respiratory: Negative for cough, shortness of breath and wheezing.    Cardiovascular: Negative for chest pain, palpitations and leg swelling.   Gastrointestinal: Negative for nausea and vomiting.   Musculoskeletal: Positive for gait problem and joint swelling.     Visit Vitals  /70 (BP Location: Left arm, Patient Position: Sitting, Cuff Size: Adult)   Pulse 71   Temp 98.2 °F (36.8 °C) (Temporal)   Resp 14   Ht 162.6 cm (64.02\")   Wt 69.4 kg (153 lb)   SpO2 98%   BMI 26.25 kg/m²     Physical Exam  Vitals and nursing note reviewed.   Constitutional:       Appearance: She is well-developed.      Interventions: Face mask in place.      Comments: Pleasant; abnormal gait. Ambulating with soft boot cast on her left foot.    HENT:      Head: Normocephalic.      Nose: Nose normal.   Eyes:      General: No scleral icterus.        Right eye: No discharge.         Left eye: No discharge.      Conjunctiva/sclera: Conjunctivae normal.   Neck:      Thyroid: No thyromegaly.      Vascular: No JVD.   Cardiovascular:      Rate and Rhythm: Normal rate and regular rhythm.      Pulses:           Dorsalis pedis pulses are 2+ on the left side.      Heart sounds: Normal heart sounds. No murmur heard.  No friction rub.   Pulmonary:      Effort: Pulmonary effort is normal. No respiratory distress.      Breath sounds: Normal breath sounds. No wheezing or rales.   Abdominal:      General: Bowel sounds are normal.      Palpations: Abdomen is soft.      Tenderness: There is no abdominal tenderness.   Musculoskeletal:         General: Tenderness, deformity and signs of injury present.      Cervical back: Neck supple.   Feet:      Left foot:      Skin integrity: Skin integrity normal.      Comments: 4th and 5th toes swelling, decrease ROM, bruising noted   Lymphadenopathy:      Cervical: No cervical adenopathy.   Skin:     General: Skin is warm and dry.      Capillary Refill: Capillary refill takes less than 2 " seconds.      Findings: No erythema or rash.   Neurological:      Mental Status: She is alert and oriented to person, place, and time.   Psychiatric:         Behavior: Behavior normal.         Thought Content: Thought content normal.         Judgment: Judgment normal.       Assessment/Plan   Diagnoses and all orders for this visit:    1. Closed fracture of multiple phalanges of toe of left foot with delayed healing, subsequent encounter (Primary)  -     XR Foot 3+ View Left; Future  -     Ambulatory Referral to Orthopedic Surgery    Findings and recommendations discussed with Zhanna. Reviewed treatment options. Left foot xray ordered. Referral to Orthopedist for evaluation. Counseled regarding supportive care measures. S/S of concern reviewed and if occur to seek further medical evaluation or if symptoms worsen or do not improve.            This document has been electronically signed by NABEEL Reid, ADAIR-BC, RAMONA

## 2022-02-14 VITALS
TEMPERATURE: 98.2 F | SYSTOLIC BLOOD PRESSURE: 100 MMHG | HEIGHT: 64 IN | BODY MASS INDEX: 26.12 KG/M2 | RESPIRATION RATE: 14 BRPM | OXYGEN SATURATION: 98 % | WEIGHT: 153 LBS | HEART RATE: 71 BPM | DIASTOLIC BLOOD PRESSURE: 70 MMHG

## 2022-04-28 ENCOUNTER — TELEMEDICINE (OUTPATIENT)
Dept: FAMILY MEDICINE CLINIC | Facility: TELEHEALTH | Age: 37
End: 2022-04-28

## 2022-04-28 DIAGNOSIS — R39.89 SUSPECTED UTI: Primary | ICD-10-CM

## 2022-04-28 PROCEDURE — 99213 OFFICE O/P EST LOW 20 MIN: CPT | Performed by: NURSE PRACTITIONER

## 2022-04-28 RX ORDER — PHENAZOPYRIDINE HYDROCHLORIDE 200 MG/1
200 TABLET, FILM COATED ORAL 3 TIMES DAILY PRN
Qty: 6 TABLET | Refills: 0 | Status: SHIPPED | OUTPATIENT
Start: 2022-04-28 | End: 2022-04-30

## 2022-04-28 RX ORDER — NORETHINDRONE ACETATE AND ETHINYL ESTRADIOL, ETHINYL ESTRADIOL AND FERROUS FUMARATE 1MG-10(24)
1 KIT ORAL DAILY
COMMUNITY
Start: 2022-03-02

## 2022-04-28 RX ORDER — ESCITALOPRAM OXALATE 20 MG/1
1 TABLET ORAL DAILY
COMMUNITY
Start: 2021-10-29 | End: 2022-06-10 | Stop reason: SDUPTHER

## 2022-04-28 RX ORDER — NITROFURANTOIN 25; 75 MG/1; MG/1
100 CAPSULE ORAL 2 TIMES DAILY
Qty: 10 CAPSULE | Refills: 0 | Status: SHIPPED | OUTPATIENT
Start: 2022-04-28 | End: 2022-05-03

## 2022-04-28 NOTE — PROGRESS NOTES
CHIEF COMPLAINT  Chief Complaint   Patient presents with   • Urinary Tract Infection         HPI  Zhanna Veras is a 36 y.o. female  presents with complaint of symptoms of UTI that started a few days ago.     Review of Systems   Constitutional: Positive for chills. Negative for diaphoresis, fatigue and fever.   Gastrointestinal: Negative for diarrhea, nausea and vomiting.   Genitourinary: Positive for dysuria (burning ), frequency, pelvic pain and urgency. Negative for flank pain, genital sores and hematuria.       Past Medical History:   Diagnosis Date   • Abdominal pain    • ADHD (attention deficit hyperactivity disorder) 2017   • Allergic 2018   • Allergic rhinitis    • Anemia 2020   • Anxiety 9/7/16   • Arthritis     osteo   • Asthma    • Cough    • Depression    • Ectopic pregnancy    • Headache 2019   • History of transfusion    • HPV (human papilloma virus) infection    • Malaise and fatigue    • Respiratory abnormality    • Urinary tract infection    • Visual impairment 8/1/95    Have had glasses since 4th grade   • Vitamin D deficiency        Family History   Problem Relation Age of Onset   • Asthma Mother    • COPD Mother    • Diabetes Mother    • Hypertension Mother    • Anxiety disorder Mother    • Depression Mother    • Hyperlipidemia Mother    • Alcohol abuse Father    • Cancer Father    • Arthritis Paternal Grandmother    • Cancer Paternal Grandmother    • COPD Paternal Grandmother    • Stroke Paternal Grandmother    • Asthma Paternal Grandmother    • Miscarriages / Stillbirths Paternal Grandmother         Last child was stillborn   • Learning disabilities Son         ADHD/ODD   • Learning disabilities Son         ADHD       Social History     Socioeconomic History   • Marital status:    Tobacco Use   • Smoking status: Former Smoker     Packs/day: 0.25     Years: 1.00     Pack years: 0.25     Types: Cigarettes     Start date: 6/1/2005     Quit date: 2006     Years since quitting: 15.9   •  Smokeless tobacco: Never Used   Substance and Sexual Activity   • Alcohol use: No   • Drug use: No   • Sexual activity: Yes     Partners: Male     Birth control/protection: Abstinence, Coitus interruptus, Condom       Zhanna Veras  reports that she quit smoking about 15 years ago. Her smoking use included cigarettes. She started smoking about 16 years ago. She has a 0.25 pack-year smoking history. She has never used smokeless tobacco.. No tobacco use.             LMP 04/01/2022 (Approximate)   Breastfeeding No     PHYSICAL EXAM  Physical Exam   Constitutional: She is oriented to person, place, and time. She appears well-developed and well-nourished. She does not have a sickly appearance. She does not appear ill. No distress.   HENT:   Head: Normocephalic and atraumatic.   Neck: Neck normal appearance.  Pulmonary/Chest: Effort normal.  No respiratory distress.  Neurological: She is alert and oriented to person, place, and time.   Skin: Skin is dry.   Psychiatric: She has a normal mood and affect.         Diagnoses and all orders for this visit:    1. Suspected UTI (Primary)    Other orders  -     phenazopyridine (Pyridium) 200 MG tablet; Take 1 tablet by mouth 3 (Three) Times a Day As Needed for Bladder Spasms for up to 2 days.  Dispense: 6 tablet; Refill: 0  -     nitrofurantoin, macrocrystal-monohydrate, (Macrobid) 100 MG capsule; Take 1 capsule by mouth 2 (Two) Times a Day for 5 days.  Dispense: 10 capsule; Refill: 0        The use of a video visit has been reviewed with the patient and verbal informd consent has een obtained. Myself and Zhanna Veras participated in this visit. The patient is located in [place] [id number]. I am located in [place], [place]. Mychart and Zoom were utilized. I spent 10 minutes in the patient's chart for this visit.           Fina Verde, APRN  04/28/2022  09:40 EDT

## 2022-04-28 NOTE — PATIENT INSTRUCTIONS
Drink plenty of water and avoid caffeine  If symptoms do not improve in 3-5 days follow up with your primary care provider or urgent care     Urinary Tract Infection, Adult  A urinary tract infection (UTI) is an infection of any part of the urinary tract. The urinary tract includes:  The kidneys.  The ureters.  The bladder.  The urethra.  These organs make, store, and get rid of pee (urine) in the body.  What are the causes?  This infection is caused by germs (bacteria) in your genital area. These germs grow and cause swelling (inflammation) of your urinary tract.  What increases the risk?  The following factors may make you more likely to develop this condition:  Using a small, thin tube (catheter) to drain pee.  Not being able to control when you pee or poop (incontinence).  Being female. If you are female, these things can increase the risk:  Using these methods to prevent pregnancy:  A medicine that kills sperm (spermicide).  A device that blocks sperm (diaphragm).  Having low levels of a female hormone (estrogen).  Being pregnant.  You are more likely to develop this condition if:  You have genes that add to your risk.  You are sexually active.  You take antibiotic medicines.  You have trouble peeing because of:  A prostate that is bigger than normal, if you are male.  A blockage in the part of your body that drains pee from the bladder.  A kidney stone.  A nerve condition that affects your bladder.  Not getting enough to drink.  Not peeing often enough.  You have other conditions, such as:  Diabetes.  A weak disease-fighting system (immune system).  Sickle cell disease.  Gout.  Injury of the spine.  What are the signs or symptoms?  Symptoms of this condition include:  Needing to pee right away.  Peeing small amounts often.  Pain or burning when peeing.  Blood in the pee.  Pee that smells bad or not like normal.  Trouble peeing.  Pee that is cloudy.  Fluid coming from the vagina, if you are female.  Pain in the  belly or lower back.  Other symptoms include:  Vomiting.  Not feeling hungry.  Feeling mixed up (confused). This may be the first symptom in older adults.  Being tired and grouchy (irritable).  A fever.  Watery poop (diarrhea).  How is this treated?  Taking antibiotic medicine.  Taking other medicines.  Drinking enough water.  In some cases, you may need to see a specialist.  Follow these instructions at home:    Medicines  Take over-the-counter and prescription medicines only as told by your doctor.  If you were prescribed an antibiotic medicine, take it as told by your doctor. Do not stop taking it even if you start to feel better.  General instructions  Make sure you:  Pee until your bladder is empty.  Do not hold pee for a long time.  Empty your bladder after sex.  Wipe from front to back after peeing or pooping if you are a female. Use each tissue one time when you wipe.  Drink enough fluid to keep your pee pale yellow.  Keep all follow-up visits.  Contact a doctor if:  You do not get better after 1-2 days.  Your symptoms go away and then come back.  Get help right away if:  You have very bad back pain.  You have very bad pain in your lower belly.  You have a fever.  You have chills.  You feeling like you will vomit or you vomit.  Summary  A urinary tract infection (UTI) is an infection of any part of the urinary tract.  This condition is caused by germs in your genital area.  There are many risk factors for a UTI.  Treatment includes antibiotic medicines.  Drink enough fluid to keep your pee pale yellow.  This information is not intended to replace advice given to you by your health care provider. Make sure you discuss any questions you have with your health care provider.  Document Revised: 07/30/2021 Document Reviewed: 07/30/2021  [employer] [facility]) Patient Education © 2021 [employer]

## 2022-06-09 ENCOUNTER — LAB (OUTPATIENT)
Dept: FAMILY MEDICINE CLINIC | Facility: CLINIC | Age: 37
End: 2022-06-09

## 2022-06-09 DIAGNOSIS — F41.1 GENERALIZED ANXIETY DISORDER: ICD-10-CM

## 2022-06-09 DIAGNOSIS — E78.2 MIXED HYPERLIPIDEMIA: ICD-10-CM

## 2022-06-09 DIAGNOSIS — D50.8 OTHER IRON DEFICIENCY ANEMIA: ICD-10-CM

## 2022-06-09 DIAGNOSIS — E55.9 VITAMIN D DEFICIENCY: ICD-10-CM

## 2022-06-09 LAB
25(OH)D3 SERPL-MCNC: 28 NG/ML (ref 30–100)
ALBUMIN SERPL-MCNC: 4.4 G/DL (ref 3.5–5.2)
ALBUMIN/GLOB SERPL: 1.8 G/DL
ALP SERPL-CCNC: 52 U/L (ref 39–117)
ALT SERPL W P-5'-P-CCNC: 10 U/L (ref 1–33)
ANION GAP SERPL CALCULATED.3IONS-SCNC: 11.7 MMOL/L (ref 5–15)
AST SERPL-CCNC: 16 U/L (ref 1–32)
BASOPHILS # BLD AUTO: 0.02 10*3/MM3 (ref 0–0.2)
BASOPHILS NFR BLD AUTO: 0.4 % (ref 0–1.5)
BILIRUB SERPL-MCNC: 0.3 MG/DL (ref 0–1.2)
BUN SERPL-MCNC: 7 MG/DL (ref 6–20)
BUN/CREAT SERPL: 11.9 (ref 7–25)
CALCIUM SPEC-SCNC: 9 MG/DL (ref 8.6–10.5)
CHLORIDE SERPL-SCNC: 104 MMOL/L (ref 98–107)
CHOLEST SERPL-MCNC: 160 MG/DL (ref 0–200)
CO2 SERPL-SCNC: 22.3 MMOL/L (ref 22–29)
CREAT SERPL-MCNC: 0.59 MG/DL (ref 0.57–1)
DEPRECATED RDW RBC AUTO: 42.7 FL (ref 37–54)
EGFRCR SERPLBLD CKD-EPI 2021: 120 ML/MIN/1.73
EOSINOPHIL # BLD AUTO: 0.08 10*3/MM3 (ref 0–0.4)
EOSINOPHIL NFR BLD AUTO: 1.7 % (ref 0.3–6.2)
ERYTHROCYTE [DISTWIDTH] IN BLOOD BY AUTOMATED COUNT: 14.1 % (ref 12.3–15.4)
FERRITIN SERPL-MCNC: 5.48 NG/ML (ref 13–150)
GLOBULIN UR ELPH-MCNC: 2.4 GM/DL
GLUCOSE SERPL-MCNC: 84 MG/DL (ref 65–99)
HCT VFR BLD AUTO: 35.4 % (ref 34–46.6)
HDLC SERPL-MCNC: 41 MG/DL (ref 40–60)
HGB BLD-MCNC: 11.3 G/DL (ref 12–15.9)
IMM GRANULOCYTES # BLD AUTO: 0.02 10*3/MM3 (ref 0–0.05)
IMM GRANULOCYTES NFR BLD AUTO: 0.4 % (ref 0–0.5)
IRON 24H UR-MRATE: 28 MCG/DL (ref 37–145)
LDLC SERPL CALC-MCNC: 102 MG/DL (ref 0–100)
LDLC/HDLC SERPL: 2.46 {RATIO}
LYMPHOCYTES # BLD AUTO: 1.52 10*3/MM3 (ref 0.7–3.1)
LYMPHOCYTES NFR BLD AUTO: 33 % (ref 19.6–45.3)
MCH RBC QN AUTO: 27 PG (ref 26.6–33)
MCHC RBC AUTO-ENTMCNC: 31.9 G/DL (ref 31.5–35.7)
MCV RBC AUTO: 84.7 FL (ref 79–97)
MONOCYTES # BLD AUTO: 0.29 10*3/MM3 (ref 0.1–0.9)
MONOCYTES NFR BLD AUTO: 6.3 % (ref 5–12)
NEUTROPHILS NFR BLD AUTO: 2.68 10*3/MM3 (ref 1.7–7)
NEUTROPHILS NFR BLD AUTO: 58.2 % (ref 42.7–76)
NRBC BLD AUTO-RTO: 0 /100 WBC (ref 0–0.2)
PLATELET # BLD AUTO: 205 10*3/MM3 (ref 140–450)
PMV BLD AUTO: 10.5 FL (ref 6–12)
POTASSIUM SERPL-SCNC: 3.5 MMOL/L (ref 3.5–5.2)
PROT SERPL-MCNC: 6.8 G/DL (ref 6–8.5)
RBC # BLD AUTO: 4.18 10*6/MM3 (ref 3.77–5.28)
SODIUM SERPL-SCNC: 138 MMOL/L (ref 136–145)
TRANSFERRIN SERPL-MCNC: 284 MG/DL (ref 200–360)
TRIGL SERPL-MCNC: 90 MG/DL (ref 0–150)
TSH SERPL DL<=0.05 MIU/L-ACNC: 1.31 UIU/ML (ref 0.27–4.2)
VIT B12 BLD-MCNC: 422 PG/ML (ref 211–946)
VLDLC SERPL-MCNC: 17 MG/DL (ref 5–40)
WBC NRBC COR # BLD: 4.61 10*3/MM3 (ref 3.4–10.8)

## 2022-06-09 PROCEDURE — 80061 LIPID PANEL: CPT | Performed by: GENERAL PRACTICE

## 2022-06-09 PROCEDURE — 85025 COMPLETE CBC W/AUTO DIFF WBC: CPT | Performed by: GENERAL PRACTICE

## 2022-06-09 PROCEDURE — 82728 ASSAY OF FERRITIN: CPT | Performed by: GENERAL PRACTICE

## 2022-06-09 PROCEDURE — 82306 VITAMIN D 25 HYDROXY: CPT | Performed by: GENERAL PRACTICE

## 2022-06-09 PROCEDURE — 83540 ASSAY OF IRON: CPT | Performed by: GENERAL PRACTICE

## 2022-06-09 PROCEDURE — 84443 ASSAY THYROID STIM HORMONE: CPT | Performed by: GENERAL PRACTICE

## 2022-06-09 PROCEDURE — 84466 ASSAY OF TRANSFERRIN: CPT | Performed by: GENERAL PRACTICE

## 2022-06-09 PROCEDURE — 82607 VITAMIN B-12: CPT | Performed by: GENERAL PRACTICE

## 2022-06-09 PROCEDURE — 80053 COMPREHEN METABOLIC PANEL: CPT | Performed by: GENERAL PRACTICE

## 2022-06-10 ENCOUNTER — OFFICE VISIT (OUTPATIENT)
Dept: FAMILY MEDICINE CLINIC | Facility: CLINIC | Age: 37
End: 2022-06-10

## 2022-06-10 DIAGNOSIS — F41.1 GENERALIZED ANXIETY DISORDER: ICD-10-CM

## 2022-06-10 DIAGNOSIS — G43.009 MIGRAINE WITHOUT AURA AND WITHOUT STATUS MIGRAINOSUS, NOT INTRACTABLE: ICD-10-CM

## 2022-06-10 DIAGNOSIS — J45.30 MILD PERSISTENT ASTHMA WITHOUT COMPLICATION: ICD-10-CM

## 2022-06-10 DIAGNOSIS — G25.81 RESTLESS LEG SYNDROME: ICD-10-CM

## 2022-06-10 DIAGNOSIS — J45.40 MODERATE PERSISTENT ASTHMA WITHOUT COMPLICATION: ICD-10-CM

## 2022-06-10 DIAGNOSIS — J30.9 CHRONIC ALLERGIC RHINITIS: Primary | ICD-10-CM

## 2022-06-10 DIAGNOSIS — E78.2 MIXED HYPERLIPIDEMIA: ICD-10-CM

## 2022-06-10 DIAGNOSIS — D50.8 OTHER IRON DEFICIENCY ANEMIA: ICD-10-CM

## 2022-06-10 DIAGNOSIS — M51.36 DDD (DEGENERATIVE DISC DISEASE), LUMBAR: ICD-10-CM

## 2022-06-10 DIAGNOSIS — E55.9 VITAMIN D DEFICIENCY: ICD-10-CM

## 2022-06-10 PROCEDURE — 99214 OFFICE O/P EST MOD 30 MIN: CPT | Performed by: GENERAL PRACTICE

## 2022-06-10 RX ORDER — ESCITALOPRAM OXALATE 20 MG/1
20 TABLET ORAL DAILY
Qty: 30 TABLET | Refills: 5 | Status: SHIPPED | OUTPATIENT
Start: 2022-06-10 | End: 2022-12-09 | Stop reason: SDUPTHER

## 2022-06-10 RX ORDER — ALBUTEROL SULFATE 90 UG/1
2 AEROSOL, METERED RESPIRATORY (INHALATION) EVERY 4 HOURS PRN
Qty: 18 G | Refills: 5 | Status: SHIPPED | OUTPATIENT
Start: 2022-06-10 | End: 2022-12-09 | Stop reason: SDUPTHER

## 2022-06-10 RX ORDER — CETIRIZINE HYDROCHLORIDE 10 MG/1
10 TABLET ORAL NIGHTLY
Qty: 30 TABLET | Refills: 5 | Status: SHIPPED | OUTPATIENT
Start: 2022-06-10 | End: 2022-12-09 | Stop reason: SDUPTHER

## 2022-06-10 RX ORDER — BUSPIRONE HYDROCHLORIDE 10 MG/1
10 TABLET ORAL 2 TIMES DAILY
Qty: 60 TABLET | Refills: 5 | Status: SHIPPED | OUTPATIENT
Start: 2022-06-10 | End: 2022-12-09 | Stop reason: SDUPTHER

## 2022-06-10 RX ORDER — MONTELUKAST SODIUM 10 MG/1
10 TABLET ORAL NIGHTLY
Qty: 30 TABLET | Refills: 5 | Status: SHIPPED | OUTPATIENT
Start: 2022-06-10 | End: 2022-12-09 | Stop reason: SDUPTHER

## 2022-06-10 NOTE — PROGRESS NOTES
Subjective   Zhanna Veras is a 36 y.o. female.     Chief Complaint  She returns for a scheduled reassessment of multiple medical problems including chronic anxiety, chronic allergic rhinitis, extrinsic asthma, and iron deficiency anemia    History of Present Illness     Chronic Anxiety  She has noted some improvement in her nervousness, worrying, and difficulty sleeping.  When severe the symptoms have been associated with mild depression.  She remains under a considerable amount of stress.  She is working full-time while her  attends osteopathic school and they have 4 children.  Both her parents  within the last several years.  She is taking escitalopram and buspirone feels that both have helped some.  To date, she has not experienced any side effects  Lab Results   Component Value Date    TSH 1.310 2022     Chronic Allergic Rhinitis  She has a long history of intermittent sneezing, rhinorrhea, nasal congestion, and postnasal drip.  There is no history of any other upper respiratory tract symptoms.  She remains on cetirizine with good effect    Asthma  She has a long history of intermittent cough, shortness of breath, and wheezing.  There is no history of any chest pain or hemoptysis.  With montelukast she continues to use her albuterol once or twice weekly at the most.  Lab Results   Component Value Date    WBC 4.61 2022    HGB 11.3 (L) 2022    HCT 35.4 2022    MCV 84.7 2022     2022     Labs  Most recent vitamin D 28 with a B12 of 422  Lab Results   Component Value Date    IRON 28 (L) 2022    TIBC 367 2020    FERRITIN 5.48 (L) 2022     Lab Results   Component Value Date    GLUCOSE 84 2022    BUN 7 2022    CREATININE 0.59 2022    EGFRIFNONA 121 2020    EGFRIFAFRI 141 2020    BCR 11.9 2022    K 3.5 2022    CO2 22.3 2022    CALCIUM 9.0 2022    PROTENTOTREF 6.3 2020    ALBUMIN 4.40  06/09/2022    LABIL2 2.0 02/11/2020    AST 16 06/09/2022    ALT 10 06/09/2022     Lab Results   Component Value Date    ALKPHOS 52 06/09/2022     Lab Results   Component Value Date    CHOL 160 06/09/2022    CHLPL 149 02/11/2020    TRIG 90 06/09/2022    HDL 41 06/09/2022     (H) 06/09/2022     The following portions of the patient's history were reviewed and updated as appropriate: allergies, current medications, past medical history, past social history and problem list.    Review of Systems   Constitutional: Positive for fatigue. Negative for appetite change, chills, fever and unexpected weight change.   HENT: Positive for congestion and rhinorrhea. Negative for ear pain, sneezing, sore throat and voice change.    Eyes: Negative for visual disturbance.   Respiratory: Negative for cough, shortness of breath and wheezing.    Cardiovascular: Negative for chest pain, palpitations and leg swelling.   Gastrointestinal: Negative for abdominal pain, blood in stool, constipation, diarrhea, nausea and vomiting.   Endocrine: Negative for polydipsia.   Genitourinary: Positive for menstrual problem (intermittent intermenstrual bleeding). Negative for difficulty urinating, dysuria, frequency, hematuria, pelvic pain, urgency, vaginal bleeding and vaginal discharge.   Musculoskeletal: Negative for arthralgias, back pain, joint swelling, myalgias and neck pain.   Skin: Negative for color change.   Neurological: Positive for headaches (more frequent at the time of her -generally responded well to OTC NSAIDs). Negative for tremors, weakness and numbness.   Psychiatric/Behavioral: Negative for dysphoric mood, sleep disturbance and suicidal ideas. The patient is nervous/anxious.      Objective   Physical Exam  Constitutional:       General: She is not in acute distress.     Appearance: Normal appearance. She is well-developed. She is not diaphoretic.      Comments: Bright and in good spirits. No apparent distress. No pallor,  jaundice, diaphoresis, or cyanosis.   HENT:      Head: Atraumatic.      Right Ear: Tympanic membrane, ear canal and external ear normal.      Left Ear: Tympanic membrane, ear canal and external ear normal.   Eyes:      Conjunctiva/sclera: Conjunctivae normal.   Neck:      Thyroid: No thyroid mass or thyromegaly.      Vascular: No carotid bruit or JVD.      Trachea: Trachea normal. No tracheal deviation.   Cardiovascular:      Rate and Rhythm: Normal rate and regular rhythm.      Heart sounds: Normal heart sounds, S1 normal and S2 normal. No murmur heard.    No gallop.   Pulmonary:      Effort: Pulmonary effort is normal.      Breath sounds: Normal breath sounds.   Chest:   Breasts:      Right: No supraclavicular adenopathy.      Left: No supraclavicular adenopathy.       Abdominal:      General: Bowel sounds are normal. There is no distension.   Musculoskeletal:      Right lower leg: No edema.      Left lower leg: No edema.   Lymphadenopathy:      Head:      Right side of head: No submental, submandibular, tonsillar, preauricular, posterior auricular or occipital adenopathy.      Left side of head: No submental, submandibular, tonsillar, preauricular, posterior auricular or occipital adenopathy.      Cervical: No cervical adenopathy.      Upper Body:      Right upper body: No supraclavicular adenopathy.      Left upper body: No supraclavicular adenopathy.   Skin:     General: Skin is warm.      Coloration: Skin is not cyanotic, jaundiced or pale.      Findings: No rash.      Nails: There is no clubbing.   Neurological:      Mental Status: She is alert and oriented to person, place, and time.      Cranial Nerves: No cranial nerve deficit.      Motor: No tremor.      Coordination: Coordination normal.      Gait: Gait normal.   Psychiatric:         Attention and Perception: Attention normal.         Mood and Affect: Mood normal.         Speech: Speech normal.         Behavior: Behavior normal.         Thought Content:  Thought content normal.       Assessment & Plan   Problems Addressed this Visit        Allergies and Adverse Reactions    Chronic allergic rhinitis   Reminded regarding allergen avoidance.  Continue current medication       Cardiac and Vasculature    Mixed hyperlipidemia  Encouraged to continue to work on her diet and exercise plan.       Endocrine and Metabolic    Vitamin D deficiency  Patient will  an OTC multivitamin with vitamin D       Hematology and Neoplasia    Iron deficiency anemia  She has had difficulty tolerating iron supplements but will try to find an OTC multivitamin with iron  Will continue to monitor  Follow up with gynecology       Mental Health    Generalized anxiety disorder  Significant situational component.   Supportive therapy.   Continue current medication.  Encouraged to report if any worse or if any new symptoms or concerns.    Relevant Medications    busPIRone (BUSPAR) 10 MG tablet    escitalopram (LEXAPRO) 20 MG tablet       Neuro    DDD (degenerative disc disease), lumbar    Migraine without aura and without status migrainosus, not intractable  Chronic.  Stable.  Encouraged to report if this should change.    Relevant Medications    escitalopram (LEXAPRO) 20 MG tablet    Restless leg syndrome       Pulmonary and Pneumonias    Mild persistent asthma without complication  Mild persistent asthma. The patient is not currently having an exacerbation. In general, the patient's disease is well controlled.  Encouraged to report if this should change.  Continue current medication    Relevant Medications    montelukast (SINGULAIR) 10 MG tablet    cetirizine (zyrTEC) 10 MG tablet    albuterol sulfate  (90 Base) MCG/ACT inhaler      Other Visit Diagnoses     Healthcare maintenance  Recommended a third dose of the Moderna COVID-19 vaccine.  Reminded to get a flu shot in the fall and to consider pneumococcal vaccination           Diagnoses       Codes Comments    Chronic allergic  rhinitis    -  Primary ICD-10-CM: J30.9  ICD-9-CM: 477.9     Mixed hyperlipidemia     ICD-10-CM: E78.2  ICD-9-CM: 272.2     Vitamin D deficiency     ICD-10-CM: E55.9  ICD-9-CM: 268.9     Generalized anxiety disorder     ICD-10-CM: F41.1  ICD-9-CM: 300.02     DDD (degenerative disc disease), lumbar     ICD-10-CM: M51.36  ICD-9-CM: 722.52     Migraine without aura and without status migrainosus, not intractable     ICD-10-CM: G43.009  ICD-9-CM: 346.10     Restless leg syndrome     ICD-10-CM: G25.81  ICD-9-CM: 333.94     Mild persistent asthma without complication     ICD-10-CM: J45.30  ICD-9-CM: 493.90     Moderate persistent asthma without complication     ICD-10-CM: J45.40  ICD-9-CM: 493.90     Other iron deficiency anemia     ICD-10-CM: D50.8  ICD-9-CM: 280.8

## 2022-06-11 VITALS
DIASTOLIC BLOOD PRESSURE: 58 MMHG | OXYGEN SATURATION: 99 % | SYSTOLIC BLOOD PRESSURE: 92 MMHG | RESPIRATION RATE: 14 BRPM | BODY MASS INDEX: 24.41 KG/M2 | HEIGHT: 64 IN | WEIGHT: 143 LBS | TEMPERATURE: 98.6 F | HEART RATE: 78 BPM

## 2022-06-15 ENCOUNTER — PRIOR AUTHORIZATION (OUTPATIENT)
Dept: FAMILY MEDICINE CLINIC | Facility: CLINIC | Age: 37
End: 2022-06-15

## 2022-12-09 ENCOUNTER — OFFICE VISIT (OUTPATIENT)
Dept: FAMILY MEDICINE CLINIC | Facility: CLINIC | Age: 37
End: 2022-12-09

## 2022-12-09 DIAGNOSIS — D50.8 OTHER IRON DEFICIENCY ANEMIA: ICD-10-CM

## 2022-12-09 DIAGNOSIS — J45.30 MILD PERSISTENT ASTHMA WITHOUT COMPLICATION: ICD-10-CM

## 2022-12-09 DIAGNOSIS — F41.1 GENERALIZED ANXIETY DISORDER: ICD-10-CM

## 2022-12-09 DIAGNOSIS — J30.9 CHRONIC ALLERGIC RHINITIS: Primary | ICD-10-CM

## 2022-12-09 DIAGNOSIS — Z00.00 HEALTHCARE MAINTENANCE: ICD-10-CM

## 2022-12-09 DIAGNOSIS — G25.81 RESTLESS LEG SYNDROME: ICD-10-CM

## 2022-12-09 DIAGNOSIS — G43.009 MIGRAINE WITHOUT AURA AND WITHOUT STATUS MIGRAINOSUS, NOT INTRACTABLE: ICD-10-CM

## 2022-12-09 DIAGNOSIS — E55.9 VITAMIN D DEFICIENCY: ICD-10-CM

## 2022-12-09 DIAGNOSIS — E78.2 MIXED HYPERLIPIDEMIA: ICD-10-CM

## 2022-12-09 DIAGNOSIS — M51.36 DDD (DEGENERATIVE DISC DISEASE), LUMBAR: ICD-10-CM

## 2022-12-09 PROCEDURE — 99214 OFFICE O/P EST MOD 30 MIN: CPT | Performed by: GENERAL PRACTICE

## 2022-12-09 RX ORDER — CETIRIZINE HYDROCHLORIDE 10 MG/1
10 TABLET ORAL NIGHTLY
Qty: 30 TABLET | Refills: 5 | Status: SHIPPED | OUTPATIENT
Start: 2022-12-09

## 2022-12-09 RX ORDER — MONTELUKAST SODIUM 10 MG/1
10 TABLET ORAL NIGHTLY
Qty: 30 TABLET | Refills: 5 | Status: SHIPPED | OUTPATIENT
Start: 2022-12-09

## 2022-12-09 RX ORDER — BUSPIRONE HYDROCHLORIDE 10 MG/1
10 TABLET ORAL 2 TIMES DAILY
Qty: 60 TABLET | Refills: 5 | Status: SHIPPED | OUTPATIENT
Start: 2022-12-09

## 2022-12-09 RX ORDER — ESCITALOPRAM OXALATE 20 MG/1
20 TABLET ORAL DAILY
Qty: 30 TABLET | Refills: 5 | Status: SHIPPED | OUTPATIENT
Start: 2022-12-09

## 2022-12-09 RX ORDER — ALBUTEROL SULFATE 90 UG/1
2 AEROSOL, METERED RESPIRATORY (INHALATION) EVERY 4 HOURS PRN
Qty: 18 G | Refills: 5 | Status: SHIPPED | OUTPATIENT
Start: 2022-12-09

## 2022-12-09 NOTE — PROGRESS NOTES
Yoni Veras is a 37 y.o. female.     Chief Complaint  She returns for a scheduled assessment of multiple medical problems including chronic anxiety, chronic allergic rhinitis, extrinsic asthma, and intermittent muscle pain at rest    History of Present Illness     Chronic Anxiety  She has a long history of intermittent nervousness, worrying, and difficulty sleeping.  When severe, these symptoms have been associated with mild depression.  She remains under a considerable amount of stress.  She is working full-time while her  attends osteopathic school and they have 4 children.  Both her parents  within the last several years.  She is taking escitalopram and buspirone feels that both have helped some.  To date, she has not experienced any side effects    Chronic Allergic Rhinitis  She has a long history of intermittent sneezing, rhinorrhea, nasal congestion, and postnasal drip.  There is no history of any other upper respiratory tract symptoms.  She remains on cetirizine with good effect    Asthma  She has a long history of intermittent cough, shortness of breath, and wheezing.  There is no history of any chest pain or hemoptysis.  With montelukast she continues to use her albuterol once or twice weekly at the most.    Muscle Pain  She admits to an intermittent tight achy discomfort about the calves bilaterally when she lies down at night.  This improves for short time with stretching or walking.  To date, there have been no associated symptoms and she denies any lower extremity joint pain, stiffness, or swelling.    The following portions of the patient's history were reviewed and updated as appropriate: allergies, current medications, past medical history, past social history and problem list.    Review of Systems   Constitutional: Positive for fatigue. Negative for appetite change, chills, fever and unexpected weight change.   HENT: Positive for rhinorrhea. Negative for congestion, ear  pain, sneezing, sore throat and voice change.    Eyes: Negative for visual disturbance.   Respiratory: Negative for cough, shortness of breath and wheezing.    Cardiovascular: Negative for chest pain, palpitations and leg swelling.   Gastrointestinal: Negative for abdominal pain, blood in stool, constipation, diarrhea, nausea and vomiting.   Endocrine: Negative for polydipsia.   Genitourinary: Positive for menstrual problem (intermittent intermenstrual bleeding). Negative for difficulty urinating, dysuria, frequency, hematuria, pelvic pain, urgency, vaginal bleeding and vaginal discharge.   Musculoskeletal: Positive for myalgias. Negative for arthralgias, back pain, joint swelling and neck pain.   Skin: Negative for color change.   Neurological: Positive for headaches (more frequent at the time of her menses -generally responded well to OTC NSAIDs). Negative for tremors, weakness and numbness.   Psychiatric/Behavioral: Positive for sleep disturbance. Negative for dysphoric mood and suicidal ideas. The patient is nervous/anxious.      Objective   Physical Exam  Constitutional:       General: She is not in acute distress.     Appearance: Normal appearance. She is well-developed. She is not diaphoretic.      Comments: Bright and in good spirits. No apparent distress. No pallor, jaundice, diaphoresis, or cyanosis.   HENT:      Head: Atraumatic.      Right Ear: Tympanic membrane, ear canal and external ear normal.      Left Ear: Tympanic membrane, ear canal and external ear normal.   Eyes:      Conjunctiva/sclera: Conjunctivae normal.   Neck:      Thyroid: No thyroid mass or thyromegaly.      Vascular: No carotid bruit or JVD.      Trachea: Trachea normal. No tracheal deviation.   Cardiovascular:      Rate and Rhythm: Normal rate and regular rhythm.      Heart sounds: Normal heart sounds, S1 normal and S2 normal. No murmur heard.    No gallop.   Pulmonary:      Effort: Pulmonary effort is normal.      Breath sounds:  Normal breath sounds.   Abdominal:      General: Bowel sounds are normal. There is no distension.   Musculoskeletal:      Right lower leg: No edema.      Left lower leg: No edema.   Lymphadenopathy:      Head:      Right side of head: No submental, submandibular, tonsillar, preauricular, posterior auricular or occipital adenopathy.      Left side of head: No submental, submandibular, tonsillar, preauricular, posterior auricular or occipital adenopathy.      Cervical: No cervical adenopathy.      Upper Body:      Right upper body: No supraclavicular adenopathy.      Left upper body: No supraclavicular adenopathy.   Skin:     General: Skin is warm.      Coloration: Skin is not cyanotic, jaundiced or pale.      Findings: No rash.      Nails: There is no clubbing.   Neurological:      Mental Status: She is alert and oriented to person, place, and time.      Cranial Nerves: No cranial nerve deficit.      Motor: No tremor.      Coordination: Coordination normal.      Gait: Gait normal.   Psychiatric:         Attention and Perception: Attention normal.         Mood and Affect: Mood normal.         Speech: Speech normal.         Behavior: Behavior normal.         Thought Content: Thought content normal.       Assessment & Plan   Problems Addressed this Visit        Allergies and Adverse Reactions    Chronic allergic rhinitis   Continue current medication  Encouraged to report if any worse or if any new symptoms or concerns.    Relevant Medications    montelukast (SINGULAIR) 10 MG tablet    cetirizine (zyrTEC) 10 MG tablet       Cardiac and Vasculature    Mixed hyperlipidemia  Encouraged to continue to work on her diet and exercise plan.  Updated labs will be drawn at her return.       Endocrine and Metabolic    Vitamin D deficiency       Hematology and Neoplasia    Iron deficiency anemia       Mental Health    Generalized anxiety disorder  Significant situational component.   Supportive therapy.   Continue current  medication.  Encouraged to report if any worse or if any new symptoms or concerns.    Relevant Medications    escitalopram (LEXAPRO) 20 MG tablet    busPIRone (BUSPAR) 10 MG tablet       Healthcare maintenance  Recommended a flu shot, updated bivalent COVID-19 shot, and a Prevnar 20.  Patient will consider  Neuro    DDD (degenerative disc disease), lumbar    Migraine without aura and without status migrainosus, not intractable  Chronic.  Stable.  Encouraged to report if any worse or if any new symptoms or concerns.            Restless leg syndrome  As above.       Pulmonary and Pneumonias    Mild persistent asthma without complication  Mild persistent asthma. The patient is not currently having an exacerbation. In general, the patient's disease is well controlled.  Encouraged to report if this should change.  Continue current medication    Relevant Medications    montelukast (SINGULAIR) 10 MG tablet    cetirizine (zyrTEC) 10 MG tablet    albuterol sulfate  (90 Base) MCG/ACT inhaler   Diagnoses       Codes Comments    Chronic allergic rhinitis    -  Primary ICD-10-CM: J30.9  ICD-9-CM: 477.9     Mixed hyperlipidemia     ICD-10-CM: E78.2  ICD-9-CM: 272.2     Vitamin D deficiency     ICD-10-CM: E55.9  ICD-9-CM: 268.9     Other iron deficiency anemia     ICD-10-CM: D50.8  ICD-9-CM: 280.8     Generalized anxiety disorder     ICD-10-CM: F41.1  ICD-9-CM: 300.02     Restless leg syndrome     ICD-10-CM: G25.81  ICD-9-CM: 333.94     Migraine without aura and without status migrainosus, not intractable     ICD-10-CM: G43.009  ICD-9-CM: 346.10     DDD (degenerative disc disease), lumbar     ICD-10-CM: M51.36  ICD-9-CM: 722.52     Mild persistent asthma without complication     ICD-10-CM: J45.30  ICD-9-CM: 493.90

## 2022-12-10 VITALS
TEMPERATURE: 97.1 F | SYSTOLIC BLOOD PRESSURE: 118 MMHG | WEIGHT: 148 LBS | DIASTOLIC BLOOD PRESSURE: 65 MMHG | RESPIRATION RATE: 14 BRPM | HEIGHT: 64 IN | OXYGEN SATURATION: 99 % | HEART RATE: 79 BPM | BODY MASS INDEX: 25.27 KG/M2

## 2022-12-10 PROBLEM — Z00.00 HEALTHCARE MAINTENANCE: Status: ACTIVE | Noted: 2022-12-10

## 2023-01-13 ENCOUNTER — TRANSCRIBE ORDERS (OUTPATIENT)
Dept: ADMINISTRATIVE | Facility: HOSPITAL | Age: 38
End: 2023-01-13
Payer: COMMERCIAL

## 2023-01-13 ENCOUNTER — TRANSCRIBE ORDERS (OUTPATIENT)
Dept: LAB | Facility: HOSPITAL | Age: 38
End: 2023-01-13
Payer: COMMERCIAL

## 2023-01-13 ENCOUNTER — LAB (OUTPATIENT)
Dept: LAB | Facility: HOSPITAL | Age: 38
End: 2023-01-13
Payer: COMMERCIAL

## 2023-01-13 DIAGNOSIS — N91.2 ABSENCE OF MENSTRUATION: Primary | ICD-10-CM

## 2023-01-13 DIAGNOSIS — N91.2 ABSENCE OF MENSTRUATION: ICD-10-CM

## 2023-01-13 LAB — HCG INTACT+B SERPL-ACNC: 249.9 MIU/ML

## 2023-01-13 PROCEDURE — 36415 COLL VENOUS BLD VENIPUNCTURE: CPT

## 2023-01-13 PROCEDURE — 84702 CHORIONIC GONADOTROPIN TEST: CPT

## 2023-02-19 ENCOUNTER — TELEMEDICINE (OUTPATIENT)
Dept: FAMILY MEDICINE CLINIC | Facility: TELEHEALTH | Age: 38
End: 2023-02-19
Payer: COMMERCIAL

## 2023-02-19 DIAGNOSIS — Z20.818 STREP THROAT EXPOSURE: ICD-10-CM

## 2023-02-19 DIAGNOSIS — J02.9 PHARYNGITIS, UNSPECIFIED ETIOLOGY: Primary | ICD-10-CM

## 2023-02-19 PROCEDURE — 99213 OFFICE O/P EST LOW 20 MIN: CPT | Performed by: NURSE PRACTITIONER

## 2023-02-19 RX ORDER — AZITHROMYCIN 250 MG/1
TABLET, FILM COATED ORAL
Qty: 6 TABLET | Refills: 0 | Status: SHIPPED | OUTPATIENT
Start: 2023-02-19

## 2023-02-19 NOTE — PATIENT INSTRUCTIONS
Change toothbrush after 2 days on antibiotics.  Tylenol/ibuprofen for pain and fever, salt water gargles for sore throat, increase fluids and rest.    If symptoms worsen or do not improve follow up with your PCP or visit your nearest Urgent Care Center or ER.

## 2023-02-19 NOTE — PROGRESS NOTES
Subjective   Chief Complaint   Patient presents with   • Sore Throat              Zhanna Veras is a 37 y.o. female.     History of Present Illness  Patient reports vocal hoarseness, sore throat, mild rash on both sides of the neck (this is resolved), and anterior cervical lymphadenopathy.  Patient's 3 children currently have strep throat.  She has had strep throat in the past with similar symptoms.  Sore Throat   This is a new problem. The current episode started today. There has been no fever. Associated symptoms include a hoarse voice and swollen glands. Pertinent negatives include no abdominal pain, congestion, coughing, diarrhea, plugged ear sensation, shortness of breath or trouble swallowing. She has had exposure to strep.        Allergies   Allergen Reactions   • Aloe Vera Rash   • Penicillins Unknown (See Comments), Rash and Unknown - Low Severity     PT'S MOTHER TOLD HER SHE WAS ALLERGIC       Past Medical History:   Diagnosis Date   • Abdominal pain    • ADHD (attention deficit hyperactivity disorder) 2017   • Allergic 2018   • Allergic rhinitis    • Anemia    • Anxiety 16   • Arthritis     osteo   • Asthma    • Cough    • Depression    • Ectopic pregnancy    • Headache    • History of transfusion    • HPV (human papilloma virus) infection    • Malaise and fatigue    • Respiratory abnormality    • Urinary tract infection    • Visual impairment 95    Have had glasses since 4th grade   • Vitamin D deficiency        Past Surgical History:   Procedure Laterality Date   • DILATATION AND CURETTAGE     • SALPINGECTOMY Right    • WISDOM TOOTH EXTRACTION         Social History     Socioeconomic History   • Marital status:    Tobacco Use   • Smoking status: Former     Packs/day: 0.25     Years: 1.00     Pack years: 0.25     Types: Cigarettes     Start date: 2005     Quit date: 2006     Years since quittin.7   • Smokeless tobacco: Never   Substance and Sexual Activity   • Alcohol  use: No   • Drug use: No   • Sexual activity: Yes     Partners: Male     Birth control/protection: Abstinence, Coitus interruptus, Condom       Family History   Problem Relation Age of Onset   • Asthma Mother    • COPD Mother    • Diabetes Mother    • Hypertension Mother    • Anxiety disorder Mother    • Depression Mother    • Hyperlipidemia Mother    • Alcohol abuse Father    • Cancer Father    • Arthritis Paternal Grandmother    • Cancer Paternal Grandmother    • COPD Paternal Grandmother    • Stroke Paternal Grandmother    • Asthma Paternal Grandmother    • Miscarriages / Stillbirths Paternal Grandmother         Last child was stillborn   • Learning disabilities Son         ADHD/ODD   • Learning disabilities Son         ADHD         Current Outpatient Medications:   •  albuterol sulfate  (90 Base) MCG/ACT inhaler, Inhale 2 puffs Every 4 (Four) Hours As Needed for Shortness of Air., Disp: 18 g, Rfl: 5  •  azithromycin (Zithromax Z-Domingo) 250 MG tablet, Take 2 tablets by mouth on day 1, then 1 tablet daily on days 2-5, Disp: 6 tablet, Rfl: 0  •  busPIRone (BUSPAR) 10 MG tablet, Take 1 tablet by mouth 2 (Two) Times a Day. 1/2 po bid for one week, then 1 po bid afterward, Disp: 60 tablet, Rfl: 5  •  cetirizine (zyrTEC) 10 MG tablet, Take 1 tablet by mouth Every Night., Disp: 30 tablet, Rfl: 5  •  escitalopram (LEXAPRO) 20 MG tablet, Take 1 tablet by mouth Daily., Disp: 30 tablet, Rfl: 5  •  Lo Loestrin Fe 1 MG-10 MCG / 10 MCG tablet, Take 1 tablet by mouth Daily., Disp: , Rfl:   •  montelukast (SINGULAIR) 10 MG tablet, Take 1 tablet by mouth Every Night., Disp: 30 tablet, Rfl: 5      Review of Systems   Constitutional: Positive for fever (tactile, low grade). Negative for chills, diaphoresis and fatigue.   HENT: Positive for hoarse voice, sore throat, swollen glands and voice change. Negative for congestion and trouble swallowing.    Respiratory: Negative for cough, chest tightness, shortness of breath and  wheezing.    Gastrointestinal: Negative for abdominal pain and diarrhea.   Musculoskeletal: Negative for myalgias.   Neurological: Negative for headache.        There were no vitals filed for this visit.    Objective   Physical Exam  Constitutional:       General: She is not in acute distress.     Appearance: Normal appearance. She is not ill-appearing, toxic-appearing or diaphoretic.   HENT:      Head: Normocephalic.      Mouth/Throat:      Lips: Pink.      Mouth: Mucous membranes are moist.   Pulmonary:      Effort: Pulmonary effort is normal.   Lymphadenopathy:      Cervical: Cervical adenopathy (per pt/) present.   Neurological:      Mental Status: She is alert and oriented to person, place, and time.   Psychiatric:         Mood and Affect: Mood normal.         Behavior: Behavior normal.          Procedures     Assessment & Plan   Diagnoses and all orders for this visit:    1. Pharyngitis, unspecified etiology (Primary)  -     azithromycin (Zithromax Z-Domingo) 250 MG tablet; Take 2 tablets by mouth on day 1, then 1 tablet daily on days 2-5  Dispense: 6 tablet; Refill: 0    2. Strep throat exposure  -     azithromycin (Zithromax Z-Domingo) 250 MG tablet; Take 2 tablets by mouth on day 1, then 1 tablet daily on days 2-5  Dispense: 6 tablet; Refill: 0        Change toothbrush after 2 days on antibiotics.  Tylenol/ibuprofen for pain and fever, salt water gargles for sore throat, increase fluids and rest.    If symptoms worsen or do not improve follow up with your PCP or visit your nearest Urgent Care Center or ER.    PLAN: Discussed dosing, side effects, recommended other symptomatic care.  Patient should follow up with primary care provider, Urgent Care or ER if symptoms worsen, fail to resolve or other symptoms need attention. Patient/family agree to the above.         NABEEL Ch     The use of a video visit has been reviewed with the patient and verbal informed consent has been obtained. Myself and  Zhanna Veras participated in this visit. The patient is located at [address] [place] [address]. I am located in [place], [place]. [employer] and Zoom were utilized.        This visit was performed via Telehealth.  This patient has been instructed to follow-up with their primary care provider if their symptoms worsen or the treatment provided does not resolve their illness.

## 2023-04-23 ENCOUNTER — TELEMEDICINE (OUTPATIENT)
Dept: FAMILY MEDICINE CLINIC | Facility: TELEHEALTH | Age: 38
End: 2023-04-23
Payer: COMMERCIAL

## 2023-04-23 DIAGNOSIS — R39.89 SUSPECTED UTI: Primary | ICD-10-CM

## 2023-04-23 RX ORDER — NITROFURANTOIN 25; 75 MG/1; MG/1
100 CAPSULE ORAL 2 TIMES DAILY
Qty: 14 CAPSULE | Refills: 0 | Status: SHIPPED | OUTPATIENT
Start: 2023-04-23

## 2023-04-23 RX ORDER — PHENAZOPYRIDINE HYDROCHLORIDE 200 MG/1
200 TABLET, FILM COATED ORAL 3 TIMES DAILY PRN
Qty: 6 TABLET | Refills: 0 | Status: SHIPPED | OUTPATIENT
Start: 2023-04-23

## 2023-04-23 NOTE — PROGRESS NOTES
CHIEF COMPLAINT  Chief Complaint   Patient presents with   • Difficulty Urinating   • Urinary Frequency         HPI  Zhanna Veras is a 37 y.o. female  presents with complaint of urinary urgency, frequency and dysuria present for 4 days. She reports no fever, back pain or blood in urine. She has mild lower suprapubic pain.     Review of Systems   Constitutional: Negative.    Respiratory: Negative.    Cardiovascular: Negative.    Gastrointestinal: Positive for abdominal pain (mild suprapubic pain).   Genitourinary: Positive for dysuria, frequency, pelvic pain and urgency. Negative for decreased urine volume, difficulty urinating, dyspareunia, enuresis, flank pain, genital sores, hematuria, menstrual problem, vaginal bleeding, vaginal discharge and vaginal pain.   Neurological: Negative.    Psychiatric/Behavioral: Negative.        Past Medical History:   Diagnosis Date   • Abdominal pain    • ADHD (attention deficit hyperactivity disorder) 2017   • Allergic 2018   • Allergic rhinitis    • Anemia 2020   • Anxiety 9/7/16   • Arthritis     osteo   • Asthma    • Cough    • Depression    • Ectopic pregnancy    • Headache 2019   • History of transfusion    • HPV (human papilloma virus) infection    • Malaise and fatigue    • Respiratory abnormality    • Urinary tract infection    • Visual impairment 8/1/95    Have had glasses since 4th grade   • Vitamin D deficiency        Family History   Problem Relation Age of Onset   • Asthma Mother    • COPD Mother    • Diabetes Mother    • Hypertension Mother    • Anxiety disorder Mother    • Depression Mother    • Hyperlipidemia Mother    • Alcohol abuse Father    • Cancer Father    • Arthritis Paternal Grandmother    • Cancer Paternal Grandmother    • COPD Paternal Grandmother    • Stroke Paternal Grandmother    • Asthma Paternal Grandmother    • Miscarriages / Stillbirths Paternal Grandmother         Last child was stillborn   • Learning disabilities Son         ADHD/ODD   •  Learning disabilities Son         ADHD       Social History     Socioeconomic History   • Marital status:    Tobacco Use   • Smoking status: Former     Packs/day: 0.25     Years: 1.00     Pack years: 0.25     Types: Cigarettes     Start date: 2005     Quit date:      Years since quittin.9   • Smokeless tobacco: Never   Substance and Sexual Activity   • Alcohol use: No   • Drug use: No   • Sexual activity: Yes     Partners: Male     Birth control/protection: Abstinence, Coitus interruptus, Condom         There were no vitals taken for this visit.    PHYSICAL EXAM  Physical Exam   Constitutional: She appears well-developed and well-nourished. She does not have a sickly appearance. She does not appear ill. No distress.   HENT:   Head: Normocephalic and atraumatic.   Abdominal: Soft. She exhibits no distension. There is abdominal tenderness (mild suprapubic tenderness) in the suprapubic area. There is no CVA tenderness.   Neurological: She is alert.   Psychiatric: She has a normal mood and affect.   Vitals reviewed.      Results for orders placed or performed in visit on 23   hCG, Quantitative, Pregnancy    Specimen: Blood   Result Value Ref Range    HCG Quantitative 249.90 mIU/mL       Diagnoses and all orders for this visit:    1. Suspected UTI (Primary)  -     nitrofurantoin, macrocrystal-monohydrate, (Macrobid) 100 MG capsule; Take 1 capsule by mouth 2 (Two) Times a Day.  Dispense: 14 capsule; Refill: 0  -     phenazopyridine (Pyridium) 200 MG tablet; Take 1 tablet by mouth 3 (Three) Times a Day As Needed for Bladder Spasms.  Dispense: 6 tablet; Refill: 0    -Macrobid as prescribed - complete entire course of medication even if you begin to feel better.   --Phenazopyridine is for painful urination and bladder spasms--this medication with cause urine to become bright orange and can stain undergarments.    -Continue to increase your fluid intake.   -Abstain from intercourse during antibiotic  treatment.   -Practice good perineal hygiene: wipe front to back  -Do not hold your urine- go to the bathroom every 2-3 hours.     -Warning signs: severe abdominal/pelvic/back pain, fever >101, blood in urine - seek medical attention as soon as possible for a hands on/objective exam and possible labs.     -Follow up with your PCP in 2 days if no improvement in symptoms or if symptoms begin to worsen.     The use of a video visit has been reviewed with the patient and verbal informd consent has een obtained. Myself and Zhanna Veras participated in this visit. The patient is located in [address] [place] [address]. I am located in [place], [place]. My[employer] and Zoom were utilized. I spent 15  minutes in the patient's chart for this visit.           Jessica Grant, APRN  04/23/2023  10:34 EDT

## 2023-06-09 ENCOUNTER — OFFICE VISIT (OUTPATIENT)
Dept: FAMILY MEDICINE CLINIC | Facility: CLINIC | Age: 38
End: 2023-06-09
Payer: COMMERCIAL

## 2023-06-09 DIAGNOSIS — M51.36 DDD (DEGENERATIVE DISC DISEASE), LUMBAR: ICD-10-CM

## 2023-06-09 DIAGNOSIS — E55.9 VITAMIN D DEFICIENCY: ICD-10-CM

## 2023-06-09 DIAGNOSIS — J30.9 CHRONIC ALLERGIC RHINITIS: Primary | ICD-10-CM

## 2023-06-09 DIAGNOSIS — E78.2 MIXED HYPERLIPIDEMIA: ICD-10-CM

## 2023-06-09 DIAGNOSIS — D50.8 OTHER IRON DEFICIENCY ANEMIA: ICD-10-CM

## 2023-06-09 DIAGNOSIS — Z00.00 HEALTHCARE MAINTENANCE: ICD-10-CM

## 2023-06-09 DIAGNOSIS — G25.81 RESTLESS LEG SYNDROME: ICD-10-CM

## 2023-06-09 DIAGNOSIS — J45.30 MILD PERSISTENT ASTHMA WITHOUT COMPLICATION: ICD-10-CM

## 2023-06-09 DIAGNOSIS — F41.1 GENERALIZED ANXIETY DISORDER: ICD-10-CM

## 2023-06-09 DIAGNOSIS — G43.009 MIGRAINE WITHOUT AURA AND WITHOUT STATUS MIGRAINOSUS, NOT INTRACTABLE: ICD-10-CM

## 2023-06-09 PROCEDURE — 80053 COMPREHEN METABOLIC PANEL: CPT | Performed by: GENERAL PRACTICE

## 2023-06-09 PROCEDURE — 80061 LIPID PANEL: CPT | Performed by: GENERAL PRACTICE

## 2023-06-09 PROCEDURE — 82728 ASSAY OF FERRITIN: CPT | Performed by: GENERAL PRACTICE

## 2023-06-09 PROCEDURE — 83540 ASSAY OF IRON: CPT | Performed by: GENERAL PRACTICE

## 2023-06-09 PROCEDURE — 84443 ASSAY THYROID STIM HORMONE: CPT | Performed by: GENERAL PRACTICE

## 2023-06-09 PROCEDURE — 84466 ASSAY OF TRANSFERRIN: CPT | Performed by: GENERAL PRACTICE

## 2023-06-09 PROCEDURE — 82306 VITAMIN D 25 HYDROXY: CPT | Performed by: GENERAL PRACTICE

## 2023-06-09 PROCEDURE — 85025 COMPLETE CBC W/AUTO DIFF WBC: CPT | Performed by: GENERAL PRACTICE

## 2023-06-09 RX ORDER — CETIRIZINE HYDROCHLORIDE 10 MG/1
10 TABLET ORAL NIGHTLY
Qty: 30 TABLET | Refills: 5 | Status: SHIPPED | OUTPATIENT
Start: 2023-06-09

## 2023-06-09 RX ORDER — BUSPIRONE HYDROCHLORIDE 10 MG/1
10 TABLET ORAL 2 TIMES DAILY
Qty: 60 TABLET | Refills: 5 | Status: SHIPPED | OUTPATIENT
Start: 2023-06-09

## 2023-06-09 RX ORDER — ALBUTEROL SULFATE 90 UG/1
2 AEROSOL, METERED RESPIRATORY (INHALATION) EVERY 4 HOURS PRN
Qty: 18 G | Refills: 5 | Status: SHIPPED | OUTPATIENT
Start: 2023-06-09

## 2023-06-09 RX ORDER — MONTELUKAST SODIUM 10 MG/1
10 TABLET ORAL NIGHTLY
Qty: 30 TABLET | Refills: 5 | Status: SHIPPED | OUTPATIENT
Start: 2023-06-09

## 2023-06-09 RX ORDER — ESCITALOPRAM OXALATE 20 MG/1
20 TABLET ORAL DAILY
Qty: 30 TABLET | Refills: 5 | Status: SHIPPED | OUTPATIENT
Start: 2023-06-09

## 2023-06-09 NOTE — PROGRESS NOTES
Subjective   Zhanna Veras is a 37 y.o. female.     Chief Complaint  She returns for a scheduled reassessment of multiple medical problems including migraine headaches, chronic anxiety, chronic allergic rhinitis, asthma, and a recent miscarriage.    History of Present Illness     Recent Miscarriage  She and her  have decided to have another baby and she stopped her birth control pills since last here.  She suffered a miscarriage 4 to 5 months ago.  She denies any lasting symptoms and continues to be followed by gynecology.    Migraine Headache  She gives a long history of migraine headaches.  She is currently having one every week or two.  She describes the pain as a throbbing you no frontal or bifrontal pain.  There is no history of any warning symptoms, but most headaches are associated with photophobia, presbyphonia, and nausea.  She denies any other visual disturbances and there is no history of any weakness, numbness, or tingling.  She has been unable to identify any precipitating or exacerbating factors and does not feel that starting or stopping her birth control pill had any impact on their frequency or severity.  She is currently taking ibuprofen and acetaminophen as needed, and while these help with the pain, they rarely leave her headaches entirely.  She remembers trying ubrelvy in the past with fairly good effect    Chronic Anxiety  She has a long history of intermittent nervousness, worrying, and difficulty sleeping.  When severe, these symptoms have been associated with mild depression.  She remains under a considerable amount of stress. She is taking escitalopram and buspirone feels that both have helped some.  To date, she has not experienced any side effects    Chronic Allergic Rhinitis  She has a long history of intermittent sneezing, rhinorrhea, nasal congestion, and postnasal drip.  There is no history of any other upper respiratory tract symptoms.  She remains on cetirizine and  montelukast with good effect    Asthma  She has a long history of intermittent cough, shortness of breath, and wheezing.  There is no history of any chest pain or hemoptysis.  With montelukast she continues to use her albuterol once or twice weekly at the most.    Muscle Pain  She admits to an intermittent tight achy discomfort about the calves bilaterally when she lies down at night.  This improves for short time with stretching or walking.  To date, there have been no associated symptoms and she denies any lower extremity joint pain, stiffness, or swelling.    The following portions of the patient's history were reviewed and updated as appropriate: allergies, current medications, past medical history, past social history, and problem list.    Review of Systems   Constitutional:  Positive for fatigue. Negative for appetite change, chills, fever and unexpected weight change.   HENT:  Positive for rhinorrhea and sneezing. Negative for congestion, ear pain and sore throat.    Eyes:  Negative for visual disturbance.   Respiratory:  Negative for cough, shortness of breath and wheezing.    Cardiovascular:  Negative for chest pain, palpitations and leg swelling.   Gastrointestinal:  Negative for abdominal pain, blood in stool, constipation, diarrhea, nausea and vomiting.   Genitourinary:  Negative for difficulty urinating, dysuria, frequency, hematuria and pelvic pain.        Occasional incontinence with coughing, sneezing, lifting etc.   Musculoskeletal:  Positive for myalgias. Negative for arthralgias, back pain and joint swelling.   Skin:  Negative for rash.   Neurological:  Positive for headaches. Negative for tremors, weakness and numbness.   Psychiatric/Behavioral:  Positive for sleep disturbance. Negative for dysphoric mood and suicidal ideas. The patient is nervous/anxious.      Objective   Physical Exam  Constitutional:       General: She is not in acute distress.     Appearance: Normal appearance. She is  well-developed. She is not diaphoretic.      Comments: Bright and in good spirits. No apparent distress. No pallor, jaundice, diaphoresis, or cyanosis.   HENT:      Head: Atraumatic.      Right Ear: Tympanic membrane, ear canal and external ear normal.      Left Ear: Tympanic membrane, ear canal and external ear normal.      Mouth/Throat:      Lips: No lesions.      Mouth: Mucous membranes are moist. No oral lesions.      Pharynx: No oropharyngeal exudate or posterior oropharyngeal erythema.   Eyes:      General: Lids are normal.      Extraocular Movements: Extraocular movements intact.      Conjunctiva/sclera: Conjunctivae normal.      Pupils: Pupils are equal.   Neck:      Thyroid: No thyroid mass or thyromegaly.      Vascular: No carotid bruit or JVD.      Trachea: Trachea normal. No tracheal deviation.   Cardiovascular:      Rate and Rhythm: Normal rate and regular rhythm.      Heart sounds: Normal heart sounds, S1 normal and S2 normal. No murmur heard.    No gallop.   Pulmonary:      Effort: Pulmonary effort is normal.      Breath sounds: Normal breath sounds.   Abdominal:      General: Bowel sounds are normal. There is no distension.   Musculoskeletal:      Right lower leg: No edema.      Left lower leg: No edema.   Lymphadenopathy:      Head:      Right side of head: No submental, submandibular, tonsillar, preauricular, posterior auricular or occipital adenopathy.      Left side of head: No submental, submandibular, tonsillar, preauricular, posterior auricular or occipital adenopathy.      Cervical: No cervical adenopathy.      Upper Body:      Right upper body: No supraclavicular adenopathy.      Left upper body: No supraclavicular adenopathy.   Skin:     General: Skin is warm.      Coloration: Skin is not cyanotic, jaundiced or pale.      Findings: No rash.      Nails: There is no clubbing.   Neurological:      Mental Status: She is alert and oriented to person, place, and time.      Cranial Nerves: No  cranial nerve deficit, dysarthria or facial asymmetry.      Sensory: No sensory deficit.      Motor: No tremor.      Coordination: Coordination normal.      Gait: Gait normal.   Psychiatric:         Attention and Perception: Attention normal.         Mood and Affect: Mood normal.         Speech: Speech normal.         Behavior: Behavior normal.         Thought Content: Thought content normal.     Assessment & Plan   Problems Addressed this Visit          Allergies and Adverse Reactions    Chronic allergic rhinitis   Continue current medication  Encouraged to report if any worse or if any new symptoms or concerns.    Relevant Medications    montelukast (SINGULAIR) 10 MG tablet    cetirizine (zyrTEC) 10 MG tablet       Cardiac and Vasculature    Mixed hyperlipidemia  Encouraged to continue to work on her diet and exercise plan.  Updated labs drawn.    Relevant Orders    Comprehensive Metabolic Panel (Completed)    Lipid Panel (Completed)    TSH (Completed)       Endocrine and Metabolic    Vitamin D deficiency    Relevant Orders    Vitamin D,25-Hydroxy (Completed)       Health Encounters    Healthcare maintenance  Patient is taking folic acid supplementation       Hematology and Neoplasia    Iron deficiency anemia  Will continue to monitor    Relevant Orders    CBC & Differential (Completed)    Iron (Completed)    Transferrin (Completed)    Ferritin (Completed)       Mental Health    Generalized anxiety disorder  Significant situational component.   Supportive therapy.   Continue current medication.  Encouraged to report if any worse or if any new symptoms or concerns.    Relevant Medications    escitalopram (LEXAPRO) 20 MG tablet    busPIRone (BUSPAR) 10 MG tablet    Other Relevant Orders    TSH (Completed)       Neuro    DDD (degenerative disc disease), lumbar    Migraine without aura and without status migrainosus, not intractable  Reviewed options going forward  Provided samples of both Ubrelvy and Nurtec.  Patient  will try each individually and will let us know how she responds.  We will consider an injectable CGRP inhibitor for prophylaxis    Relevant Medications    escitalopram (LEXAPRO) 20 MG tablet    ubrogepant (Ubrelvy) 50 MG tablet    Rimegepant Sulfate (Nurtec) 75 MG tablet dispersible tablet    Restless leg syndrome       Pulmonary and Pneumonias    Mild persistent asthma without complication  Mild persistent asthma. The patient is not currently having an exacerbation. In general, the patient's disease is well controlled.  Encouraged to report if this should change.  Continue current medication    Relevant Medications    montelukast (SINGULAIR) 10 MG tablet    cetirizine (zyrTEC) 10 MG tablet    albuterol sulfate  (90 Base) MCG/ACT inhaler     Diagnoses         Codes Comments    Chronic allergic rhinitis    -  Primary ICD-10-CM: J30.9  ICD-9-CM: 477.9     Mixed hyperlipidemia     ICD-10-CM: E78.2  ICD-9-CM: 272.2     Vitamin D deficiency     ICD-10-CM: E55.9  ICD-9-CM: 268.9     Healthcare maintenance     ICD-10-CM: Z00.00  ICD-9-CM: V70.0     Generalized anxiety disorder     ICD-10-CM: F41.1  ICD-9-CM: 300.02     Restless leg syndrome     ICD-10-CM: G25.81  ICD-9-CM: 333.94     DDD (degenerative disc disease), lumbar     ICD-10-CM: M51.36  ICD-9-CM: 722.52     Migraine without aura and without status migrainosus, not intractable     ICD-10-CM: G43.009  ICD-9-CM: 346.10     Mild persistent asthma without complication     ICD-10-CM: J45.30  ICD-9-CM: 493.90     Other iron deficiency anemia     ICD-10-CM: D50.8  ICD-9-CM: 280.8

## 2023-06-10 VITALS
TEMPERATURE: 98.6 F | HEIGHT: 64 IN | WEIGHT: 143 LBS | HEART RATE: 82 BPM | BODY MASS INDEX: 24.41 KG/M2 | OXYGEN SATURATION: 100 % | SYSTOLIC BLOOD PRESSURE: 92 MMHG | DIASTOLIC BLOOD PRESSURE: 60 MMHG | RESPIRATION RATE: 14 BRPM

## 2023-06-10 LAB
25(OH)D3 SERPL-MCNC: 29.5 NG/ML (ref 30–100)
ALBUMIN SERPL-MCNC: 4.8 G/DL (ref 3.5–5.2)
ALBUMIN/GLOB SERPL: 1.7 G/DL
ALP SERPL-CCNC: 45 U/L (ref 39–117)
ALT SERPL W P-5'-P-CCNC: 15 U/L (ref 1–33)
ANION GAP SERPL CALCULATED.3IONS-SCNC: 10 MMOL/L (ref 5–15)
AST SERPL-CCNC: 19 U/L (ref 1–32)
BASOPHILS # BLD AUTO: 0.04 10*3/MM3 (ref 0–0.2)
BASOPHILS NFR BLD AUTO: 0.8 % (ref 0–1.5)
BILIRUB SERPL-MCNC: 0.2 MG/DL (ref 0–1.2)
BUN SERPL-MCNC: 14 MG/DL (ref 6–20)
BUN/CREAT SERPL: 24.6 (ref 7–25)
CALCIUM SPEC-SCNC: 9.2 MG/DL (ref 8.6–10.5)
CHLORIDE SERPL-SCNC: 104 MMOL/L (ref 98–107)
CHOLEST SERPL-MCNC: 184 MG/DL (ref 0–200)
CO2 SERPL-SCNC: 25 MMOL/L (ref 22–29)
CREAT SERPL-MCNC: 0.57 MG/DL (ref 0.57–1)
DEPRECATED RDW RBC AUTO: 41.2 FL (ref 37–54)
EGFRCR SERPLBLD CKD-EPI 2021: 120.2 ML/MIN/1.73
EOSINOPHIL # BLD AUTO: 0.1 10*3/MM3 (ref 0–0.4)
EOSINOPHIL NFR BLD AUTO: 1.9 % (ref 0.3–6.2)
ERYTHROCYTE [DISTWIDTH] IN BLOOD BY AUTOMATED COUNT: 12.8 % (ref 12.3–15.4)
FERRITIN SERPL-MCNC: 28.2 NG/ML (ref 13–150)
GLOBULIN UR ELPH-MCNC: 2.8 GM/DL
GLUCOSE SERPL-MCNC: 80 MG/DL (ref 65–99)
HCT VFR BLD AUTO: 38.5 % (ref 34–46.6)
HDLC SERPL-MCNC: 43 MG/DL (ref 40–60)
HGB BLD-MCNC: 12.7 G/DL (ref 12–15.9)
IMM GRANULOCYTES # BLD AUTO: 0.02 10*3/MM3 (ref 0–0.05)
IMM GRANULOCYTES NFR BLD AUTO: 0.4 % (ref 0–0.5)
IRON 24H UR-MRATE: 43 MCG/DL (ref 37–145)
LDLC SERPL CALC-MCNC: 127 MG/DL (ref 0–100)
LDLC/HDLC SERPL: 2.93 {RATIO}
LYMPHOCYTES # BLD AUTO: 1.56 10*3/MM3 (ref 0.7–3.1)
LYMPHOCYTES NFR BLD AUTO: 30.2 % (ref 19.6–45.3)
MCH RBC QN AUTO: 29 PG (ref 26.6–33)
MCHC RBC AUTO-ENTMCNC: 33 G/DL (ref 31.5–35.7)
MCV RBC AUTO: 87.9 FL (ref 79–97)
MONOCYTES # BLD AUTO: 0.28 10*3/MM3 (ref 0.1–0.9)
MONOCYTES NFR BLD AUTO: 5.4 % (ref 5–12)
NEUTROPHILS NFR BLD AUTO: 3.17 10*3/MM3 (ref 1.7–7)
NEUTROPHILS NFR BLD AUTO: 61.3 % (ref 42.7–76)
NRBC BLD AUTO-RTO: 0 /100 WBC (ref 0–0.2)
PLATELET # BLD AUTO: 231 10*3/MM3 (ref 140–450)
PMV BLD AUTO: 10.8 FL (ref 6–12)
POTASSIUM SERPL-SCNC: 4 MMOL/L (ref 3.5–5.2)
PROT SERPL-MCNC: 7.6 G/DL (ref 6–8.5)
RBC # BLD AUTO: 4.38 10*6/MM3 (ref 3.77–5.28)
SODIUM SERPL-SCNC: 139 MMOL/L (ref 136–145)
TRANSFERRIN SERPL-MCNC: 249 MG/DL (ref 200–360)
TRIGL SERPL-MCNC: 76 MG/DL (ref 0–150)
TSH SERPL DL<=0.05 MIU/L-ACNC: 2.27 UIU/ML (ref 0.27–4.2)
VLDLC SERPL-MCNC: 14 MG/DL (ref 5–40)
WBC NRBC COR # BLD: 5.17 10*3/MM3 (ref 3.4–10.8)

## 2023-06-10 RX ORDER — RIMEGEPANT SULFATE 75 MG/75MG
1 TABLET, ORALLY DISINTEGRATING ORAL DAILY PRN
Qty: 2 TABLET | Refills: 0 | COMMUNITY
Start: 2023-06-10

## 2023-08-03 ENCOUNTER — TELEPHONE (OUTPATIENT)
Dept: FAMILY MEDICINE CLINIC | Facility: CLINIC | Age: 38
End: 2023-08-03

## 2023-08-03 DIAGNOSIS — J45.30 MILD PERSISTENT ASTHMA WITHOUT COMPLICATION: ICD-10-CM

## 2023-08-03 DIAGNOSIS — F41.1 GENERALIZED ANXIETY DISORDER: ICD-10-CM

## 2023-08-03 DIAGNOSIS — J30.9 CHRONIC ALLERGIC RHINITIS: ICD-10-CM

## 2023-08-03 RX ORDER — ESCITALOPRAM OXALATE 20 MG/1
TABLET ORAL
Qty: 30 TABLET | Refills: 5 | OUTPATIENT
Start: 2023-08-03

## 2023-08-03 RX ORDER — MONTELUKAST SODIUM 10 MG/1
TABLET ORAL
Qty: 30 TABLET | Refills: 5 | OUTPATIENT
Start: 2023-08-03

## 2023-08-03 NOTE — TELEPHONE ENCOUNTER
Caller: Zhanna Veras    Relationship: Self    Best call back number:       [phone] (Home)     Which medication are you concerned about:     escitalopram (LEXAPRO) 20 MG tablet       montelukast (SINGULAIR) 10 MG tablet     Who prescribed you this medication:     DR MILLER    What are your concerns:     PATIENT STATED MEDICATION REFILLS WERE DENIED BY PROVIDER AS ADVISED BY HER PHARMACY    PREFERRED PHARMACY:    [place], [place]    TELEPHONE CONTACT:    [phone]    PATIENT REQUESTED A CALL BACK WITH INFORMATION AS TO WHY THE MEDICATION REFILLS WERE DENIED BY DR MILLER

## 2023-08-04 NOTE — TELEPHONE ENCOUNTER
Called and let the patient know that her refills were denied because she has refills on file at the pharmacy. This was verified with Tristan at [facility] and he is going to go ahead and get them ready for the patient.

## 2023-09-07 DIAGNOSIS — J45.30 MILD PERSISTENT ASTHMA WITHOUT COMPLICATION: ICD-10-CM

## 2023-09-07 DIAGNOSIS — J30.9 CHRONIC ALLERGIC RHINITIS: ICD-10-CM

## 2023-09-07 RX ORDER — CETIRIZINE HYDROCHLORIDE 10 MG/1
TABLET ORAL
Qty: 30 TABLET | Refills: 5 | Status: SHIPPED | OUTPATIENT
Start: 2023-09-07

## 2023-12-01 ENCOUNTER — TELEMEDICINE (OUTPATIENT)
Dept: FAMILY MEDICINE CLINIC | Facility: TELEHEALTH | Age: 38
End: 2023-12-01
Payer: COMMERCIAL

## 2023-12-01 VITALS — WEIGHT: 143 LBS | HEIGHT: 64 IN | BODY MASS INDEX: 24.41 KG/M2

## 2023-12-01 DIAGNOSIS — U07.1 COVID: Primary | ICD-10-CM

## 2023-12-01 RX ORDER — GUAIFENESIN 600 MG/1
600 TABLET, EXTENDED RELEASE ORAL 2 TIMES DAILY
Qty: 28 TABLET | Refills: 0 | Status: SHIPPED | OUTPATIENT
Start: 2023-12-01 | End: 2023-12-15

## 2023-12-01 RX ORDER — DEXTROMETHORPHAN HYDROBROMIDE AND PROMETHAZINE HYDROCHLORIDE 15; 6.25 MG/5ML; MG/5ML
5 SYRUP ORAL NIGHTLY PRN
Qty: 118 ML | Refills: 0 | Status: SHIPPED | OUTPATIENT
Start: 2023-12-01

## 2023-12-01 NOTE — PROGRESS NOTES
You have chosen to receive care through a telehealth visit.  Do you consent to use a video/audio connection for your medical care today? Yes     HPI  Zhanna Veras is a 37 y.o. female  presents with complaint of COVID. She reports sinus and chest congestion. Her symptoms started yesterday and she tested positive for COVID yesterday. Additional symptoms that she is having are noted in the ROS portion of this visit. 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 She does not think that she has had the latest COVID vaccine. Over the counter she has taken tylenol for her symptoms. She takes cetirizine on a daily basis.    Review of Systems   Constitutional:  Positive for chills, fatigue and fever.   HENT:  Positive for congestion, postnasal drip, rhinorrhea, sinus pressure, sinus pain, sneezing and voice change. Sore throat: tickle.   Respiratory:  Positive for cough, chest tightness, shortness of breath and wheezing.    Gastrointestinal:  Positive for nausea. Negative for diarrhea.   Musculoskeletal:  Positive for myalgias.   Neurological:  Positive for headaches.       Past Medical History:   Diagnosis Date    Abdominal pain     ADHD (attention deficit hyperactivity disorder) 2017    Allergic 2018    Allergic rhinitis     Anemia 2020    Anxiety 9/7/16    Arthritis     osteo    Asthma     Cough     Depression     Ectopic pregnancy     Headache 2019    History of transfusion     HPV (human papilloma virus) infection     Malaise and fatigue     Respiratory abnormality     Urinary tract infection     Visual impairment 8/1/95    Have had glasses since 4th grade    Vitamin D deficiency        Family History   Problem Relation Age of Onset    Asthma Mother     COPD Mother     Diabetes Mother     Hypertension Mother     Anxiety disorder Mother     Depression Mother     Hyperlipidemia Mother     Alcohol abuse Father     Cancer Father     Arthritis Paternal Grandmother     Cancer Paternal Grandmother  "    COPD Paternal Grandmother     Stroke Paternal Grandmother     Asthma Paternal Grandmother     Miscarriages / Stillbirths Paternal Grandmother         Last child was stillborn    Learning disabilities Son         ADHD/ODD    Learning disabilities Son         ADHD       Social History     Socioeconomic History    Marital status:    Tobacco Use    Smoking status: Former     Packs/day: 0.25     Years: 1.00     Additional pack years: 0.00     Total pack years: 0.25     Types: Cigarettes     Start date: 2005     Quit date: 2006     Years since quittin.5    Smokeless tobacco: Never   Substance and Sexual Activity    Alcohol use: No    Drug use: No    Sexual activity: Yes     Partners: Male     Birth control/protection: Abstinence, Coitus interruptus, Condom       Zhanna Veras  reports that she quit smoking about 17 years ago. Her smoking use included cigarettes. She started smoking about 18 years ago. She has a 0.25 pack-year smoking history. She has never used smokeless tobacco..     Ht 162.6 cm (64\")   Wt 64.9 kg (143 lb)   Breastfeeding No   BMI 24.55 kg/m²     PHYSICAL EXAM  Physical Exam   Constitutional: She is oriented to person, place, and time. She appears well-developed.   HENT:   Head: Normocephalic and atraumatic.   Nose: Congestion present. Right sinus exhibits maxillary sinus tenderness (patient directed exam) and frontal sinus tenderness (patient directed exam). Left sinus exhibits maxillary sinus tenderness (patient directed exam) and frontal sinus tenderness (patient directed exam).   Eyes: Lids are normal. Right eye exhibits no discharge. Left eye exhibits no discharge. Right conjunctiva is not injected. Left conjunctiva is not injected.   Pulmonary/Chest:  No respiratory distress.  Neurological: She is alert and oriented to person, place, and time. No cranial nerve deficit.   Psychiatric: She has a normal mood and affect. Her speech is normal and behavior is normal. Judgment " and thought content normal.       Results for orders placed or performed in visit on 06/09/23   Comprehensive Metabolic Panel    Specimen: Arm, Right; Blood   Result Value Ref Range    Glucose 80 65 - 99 mg/dL    BUN 14 6 - 20 mg/dL    Creatinine 0.57 0.57 - 1.00 mg/dL    Sodium 139 136 - 145 mmol/L    Potassium 4.0 3.5 - 5.2 mmol/L    Chloride 104 98 - 107 mmol/L    CO2 25.0 22.0 - 29.0 mmol/L    Calcium 9.2 8.6 - 10.5 mg/dL    Total Protein 7.6 6.0 - 8.5 g/dL    Albumin 4.8 3.5 - 5.2 g/dL    ALT (SGPT) 15 1 - 33 U/L    AST (SGOT) 19 1 - 32 U/L    Alkaline Phosphatase 45 39 - 117 U/L    Total Bilirubin 0.2 0.0 - 1.2 mg/dL    Globulin 2.8 gm/dL    A/G Ratio 1.7 g/dL    BUN/Creatinine Ratio 24.6 7.0 - 25.0    Anion Gap 10.0 5.0 - 15.0 mmol/L    eGFR 120.2 >60.0 mL/min/1.73   Lipid Panel    Specimen: Arm, Right; Blood   Result Value Ref Range    Total Cholesterol 184 0 - 200 mg/dL    Triglycerides 76 0 - 150 mg/dL    HDL Cholesterol 43 40 - 60 mg/dL    LDL Cholesterol  127 (H) 0 - 100 mg/dL    VLDL Cholesterol 14 5 - 40 mg/dL    LDL/HDL Ratio 2.93    TSH    Specimen: Arm, Right; Blood   Result Value Ref Range    TSH 2.270 0.270 - 4.200 uIU/mL   Vitamin D,25-Hydroxy    Specimen: Arm, Right; Blood   Result Value Ref Range    25 Hydroxy, Vitamin D 29.5 (L) 30.0 - 100.0 ng/ml   Iron    Specimen: Arm, Right; Blood   Result Value Ref Range    Iron 43 37 - 145 mcg/dL   Transferrin    Specimen: Arm, Right; Blood   Result Value Ref Range    Transferrin 249 200 - 360 mg/dL   Ferritin    Specimen: Arm, Right; Blood   Result Value Ref Range    Ferritin 28.20 13.00 - 150.00 ng/mL   CBC Auto Differential    Specimen: Arm, Right; Blood   Result Value Ref Range    WBC 5.17 3.40 - 10.80 10*3/mm3    RBC 4.38 3.77 - 5.28 10*6/mm3    Hemoglobin 12.7 12.0 - 15.9 g/dL    Hematocrit 38.5 34.0 - 46.6 %    MCV 87.9 79.0 - 97.0 fL    MCH 29.0 26.6 - 33.0 pg    MCHC 33.0 31.5 - 35.7 g/dL    RDW 12.8 12.3 - 15.4 %    RDW-SD 41.2 37.0 - 54.0 fl     MPV 10.8 6.0 - 12.0 fL    Platelets 231 140 - 450 10*3/mm3    Neutrophil % 61.3 42.7 - 76.0 %    Lymphocyte % 30.2 19.6 - 45.3 %    Monocyte % 5.4 5.0 - 12.0 %    Eosinophil % 1.9 0.3 - 6.2 %    Basophil % 0.8 0.0 - 1.5 %    Immature Grans % 0.4 0.0 - 0.5 %    Neutrophils, Absolute 3.17 1.70 - 7.00 10*3/mm3    Lymphocytes, Absolute 1.56 0.70 - 3.10 10*3/mm3    Monocytes, Absolute 0.28 0.10 - 0.90 10*3/mm3    Eosinophils, Absolute 0.10 0.00 - 0.40 10*3/mm3    Basophils, Absolute 0.04 0.00 - 0.20 10*3/mm3    Immature Grans, Absolute 0.02 0.00 - 0.05 10*3/mm3    nRBC 0.0 0.0 - 0.2 /100 WBC       Diagnoses and all orders for this visit:    1. COVID (Primary)    Other orders  -     Nirmatrelvir & Ritonavir, 300mg/100mg, (PAXLOVID) 20 x 150 MG & 10 x 100MG tablet therapy pack tablet; Take 3 tablets by mouth 2 (Two) Times a Day for 5 days.  Dispense: 30 tablet; Refill: 0  -     guaiFENesin (Mucinex) 600 MG 12 hr tablet; Take 1 tablet by mouth 2 (Two) Times a Day for 14 days.  Dispense: 28 tablet; Refill: 0  -     promethazine-dextromethorphan (PROMETHAZINE-DM) 6.25-15 MG/5ML syrup; Take 5 mL by mouth At Night As Needed for Cough.  Dispense: 118 mL; Refill: 0    Patient has read the Paxlovid patient fact sheet. She is aware that this is an Emergency Use Authorization Drug.  Mucinex with plenty of fluids especially water to thin secretions and help with congestion.  Do not drive after taking promethazine DM as it may make you drowsy  Patient is not taking Buspar or Ubrelvy at this time  May alternate tylenol and ibuprofen  Hydrate well  Alternate rest and mild exercise as tolerated    Regardless of vaccination status  Isolate if you are having symptoms and are waiting for test results  If you test positive for COVID-19 (isolate)                 * Stay home x 5 days              * If you have no symptoms or your symptoms are resolving after 5 days, you can leave your house.              * Continue to wear a mask around  others for 5 additional days                            If you have a fever, continue to stay home until your fever resolves.                 If you were exposed to someone with COVID-19      Regardless of vaccination  You do not need to quarantine. You do need to wear a high quality mask for 10 days. You should also need to  test after days 5 and then if positive for COVID follow CDC isolation guidelines.       FOLLOW-UP  If symptoms worsen or persist follow up with PCP, [facility] or Urgent Care    Patient verbalizes understanding of medication dosage, comfort measures, instructions for treatment and follow-up.    Zuly Frank, APRN  12/01/2023  13:52 EST    The use of a video visit has been reviewed with the patient and verbal informed consent has been obtained. Myself and Zhanna Veras participated in this visit. The patient is located in [address] [place] [address].    I am located in [place], [place]. [employer] and [employer] Video Client were utilized. I spent 25 minutes in the patient's chart for this visit.

## 2023-12-11 ENCOUNTER — OFFICE VISIT (OUTPATIENT)
Dept: FAMILY MEDICINE CLINIC | Facility: CLINIC | Age: 38
End: 2023-12-11
Payer: COMMERCIAL

## 2023-12-11 VITALS
TEMPERATURE: 98.6 F | OXYGEN SATURATION: 98 % | WEIGHT: 144 LBS | SYSTOLIC BLOOD PRESSURE: 88 MMHG | DIASTOLIC BLOOD PRESSURE: 55 MMHG | RESPIRATION RATE: 14 BRPM | HEART RATE: 85 BPM | BODY MASS INDEX: 24.59 KG/M2 | HEIGHT: 64 IN

## 2023-12-11 DIAGNOSIS — J30.9 CHRONIC ALLERGIC RHINITIS: Primary | ICD-10-CM

## 2023-12-11 DIAGNOSIS — G43.009 MIGRAINE WITHOUT AURA AND WITHOUT STATUS MIGRAINOSUS, NOT INTRACTABLE: ICD-10-CM

## 2023-12-11 DIAGNOSIS — J45.30 MILD PERSISTENT ASTHMA WITHOUT COMPLICATION: ICD-10-CM

## 2023-12-11 DIAGNOSIS — Z86.16 HISTORY OF COVID-19: ICD-10-CM

## 2023-12-11 DIAGNOSIS — M51.36 DDD (DEGENERATIVE DISC DISEASE), LUMBAR: ICD-10-CM

## 2023-12-11 DIAGNOSIS — F41.1 GENERALIZED ANXIETY DISORDER: ICD-10-CM

## 2023-12-11 DIAGNOSIS — D50.8 OTHER IRON DEFICIENCY ANEMIA: ICD-10-CM

## 2023-12-11 DIAGNOSIS — E78.2 MIXED HYPERLIPIDEMIA: ICD-10-CM

## 2023-12-11 DIAGNOSIS — E55.9 VITAMIN D DEFICIENCY: ICD-10-CM

## 2023-12-11 DIAGNOSIS — Z00.00 HEALTHCARE MAINTENANCE: ICD-10-CM

## 2023-12-11 DIAGNOSIS — G25.81 RESTLESS LEG SYNDROME: ICD-10-CM

## 2023-12-11 RX ORDER — CETIRIZINE HYDROCHLORIDE 10 MG/1
10 TABLET ORAL
Qty: 30 TABLET | Refills: 5 | Status: SHIPPED | OUTPATIENT
Start: 2023-12-11

## 2023-12-11 RX ORDER — MONTELUKAST SODIUM 10 MG/1
10 TABLET ORAL NIGHTLY
Qty: 30 TABLET | Refills: 5 | Status: SHIPPED | OUTPATIENT
Start: 2023-12-11

## 2023-12-11 RX ORDER — ALBUTEROL SULFATE 90 UG/1
2 AEROSOL, METERED RESPIRATORY (INHALATION) EVERY 4 HOURS PRN
Qty: 18 G | Refills: 5 | Status: SHIPPED | OUTPATIENT
Start: 2023-12-11

## 2023-12-11 RX ORDER — ESCITALOPRAM OXALATE 20 MG/1
20 TABLET ORAL DAILY
Qty: 30 TABLET | Refills: 5 | Status: SHIPPED | OUTPATIENT
Start: 2023-12-11

## 2023-12-11 NOTE — PROGRESS NOTES
Yoni Veras is a 38 y.o. female.     Chief Complaint  She returns for a scheduled reassessment of multiple medical problems including migraine headache without aura, chronic anxiety, chronic allergic tinnitus, asthma, and a recent COVID-19 infection    History of Present Illness     Recent COVID-19 Infection  She continues to have a dry cough associated with mild shortness of breath on exertion.  The symptoms have been associated with intermittent left posterior lateral chest pain with coughing, twisting, or turning.  She denies any pain with deep breaths and there is no history of any hemoptysis.  She denies any other sequela and there is no history of any fever or chills    Migraine Headache  She gives a long history of migraine headaches.  She is currently having one every month.  She describes the pain as a throbbing uni-frontal or bi-frontal pain.  There is no history of any warning symptoms, but most headaches are associated with photophobia, presbyphonia, and nausea.  She continues to deny any other visual disturbances and there is no history of any weakness, numbness, or tingling.  She has been unable to identify any precipitating or exacerbating factors and does not feel that starting or stopping her birth control pill had any impact on their frequency or severity.  She continues on ibuprofen and acetaminophen, and with a nap, her headaches are generally relieved in 20 to 30 minutes  Lab Results   Component Value Date    WBC 5.17 06/09/2023    HGB 12.7 06/09/2023    HCT 38.5 06/09/2023    MCV 87.9 06/09/2023     06/09/2023     Lab Results   Component Value Date    IRON 43 06/09/2023    TIBC 367 08/17/2020    FERRITIN 28.20 06/09/2023     Chronic Anxiety  She has a long history of intermittent nervousness, worrying, and difficulty sleeping.  When severe, these symptoms have been associated with mild depression.  She remains under a considerable amount of stress. She is taking  escitalopram and buspirone feels that both have helped some.  To date, she has not experienced any side effects  Lab Results   Component Value Date    TSH 2.270 06/09/2023     Chronic Allergic Rhinitis  She has a long history of intermittent sneezing, rhinorrhea, nasal congestion, and postnasal drip.  There is no history of any other upper respiratory tract symptoms.  She remains on cetirizine and montelukast with good effect    Asthma  She has a long history of intermittent cough, shortness of breath, and wheezing.  There is no history of any chest pain or hemoptysis.  With montelukast she continues to use her albuterol once or twice weekly at the most.    Labs  Most recent vitamin D 29.5  Lab Results   Component Value Date    GLUCOSE 80 06/09/2023    BUN 14 06/09/2023    CREATININE 0.57 06/09/2023    EGFR 120.2 06/09/2023    BCR 24.6 06/09/2023    K 4.0 06/09/2023    CO2 25.0 06/09/2023    CALCIUM 9.2 06/09/2023    PROTENTOTREF 6.3 02/11/2020    ALBUMIN 4.8 06/09/2023    BILITOT 0.2 06/09/2023    AST 19 06/09/2023    ALT 15 06/09/2023     Lab Results   Component Value Date    ALKPHOS 45 06/09/2023     Lab Results   Component Value Date    CHOL 184 06/09/2023    CHLPL 149 02/11/2020    TRIG 76 06/09/2023    HDL 43 06/09/2023     (H) 06/09/2023     The following portions of the patient's history were reviewed and updated as appropriate: allergies, current medications, past medical history, past social history, and problem list.    Review of Systems   Constitutional:  Positive for fatigue. Negative for appetite change, chills, fever and unexpected weight change.   HENT:  Positive for rhinorrhea and sneezing. Negative for congestion, ear pain and sore throat.    Eyes:  Negative for visual disturbance.   Respiratory:  Positive for cough. Negative for shortness of breath and wheezing.    Cardiovascular:  Positive for chest pain. Negative for palpitations and leg swelling.   Gastrointestinal:  Negative for  abdominal pain, blood in stool, constipation, diarrhea, nausea and vomiting.   Genitourinary:  Negative for difficulty urinating, dysuria, frequency, hematuria and pelvic pain.        Occasional incontinence with coughing, sneezing, lifting etc.   Musculoskeletal:  Positive for myalgias. Negative for arthralgias, back pain and joint swelling.   Skin:  Negative for rash.   Neurological:  Positive for headaches. Negative for tremors, weakness and numbness.   Psychiatric/Behavioral:  Positive for sleep disturbance. Negative for dysphoric mood and suicidal ideas. The patient is not nervous/anxious.      Objective   Physical Exam  Constitutional:       General: She is not in acute distress.     Appearance: Normal appearance. She is well-developed. She is not diaphoretic.      Comments: Bright and in good spirits. No apparent distress. No pallor, jaundice, diaphoresis, or cyanosis.   HENT:      Head: Atraumatic.      Right Ear: Tympanic membrane, ear canal and external ear normal.      Left Ear: Tympanic membrane, ear canal and external ear normal.      Mouth/Throat:      Lips: No lesions.      Mouth: Mucous membranes are moist. No oral lesions.      Pharynx: No oropharyngeal exudate or posterior oropharyngeal erythema.   Eyes:      General: Lids are normal.      Extraocular Movements: Extraocular movements intact.      Conjunctiva/sclera: Conjunctivae normal.      Pupils: Pupils are equal.   Neck:      Thyroid: No thyroid mass or thyromegaly.      Vascular: No carotid bruit or JVD.      Trachea: Trachea normal. No tracheal deviation.   Cardiovascular:      Rate and Rhythm: Normal rate and regular rhythm.      Heart sounds: Normal heart sounds, S1 normal and S2 normal. No murmur heard.     No gallop.   Pulmonary:      Effort: Pulmonary effort is normal.      Breath sounds: Normal breath sounds.   Chest:      Chest wall: Tenderness (left thoracic paraspinal muscles -identical to presenting pain) present.      Comments: No  point or chest compression tenderness  Abdominal:      General: Bowel sounds are normal. There is no distension.   Musculoskeletal:      Right lower leg: No edema.      Left lower leg: No edema.   Lymphadenopathy:      Head:      Right side of head: No submental, submandibular, tonsillar, preauricular, posterior auricular or occipital adenopathy.      Left side of head: No submental, submandibular, tonsillar, preauricular, posterior auricular or occipital adenopathy.      Cervical: No cervical adenopathy.      Upper Body:      Right upper body: No supraclavicular adenopathy.      Left upper body: No supraclavicular adenopathy.   Skin:     General: Skin is warm.      Coloration: Skin is not cyanotic, jaundiced or pale.      Findings: No rash.      Nails: There is no clubbing.   Neurological:      Mental Status: She is alert and oriented to person, place, and time.      Cranial Nerves: No cranial nerve deficit, dysarthria or facial asymmetry.      Sensory: No sensory deficit.      Motor: No tremor.      Coordination: Coordination normal.      Gait: Gait normal.   Psychiatric:         Attention and Perception: Attention normal.         Mood and Affect: Mood normal.         Speech: Speech normal.         Behavior: Behavior normal.         Thought Content: Thought content normal.       Assessment & Plan   Problems Addressed this Visit          Allergies and Adverse Reactions    Chronic allergic rhinitis  Continue current medication  Encouraged to report if any worse or if any new symptoms or concerns.    Relevant Medications    cetirizine (zyrTEC) 10 MG tablet    montelukast (SINGULAIR) 10 MG tablet       Cardiac and Vasculature    Mixed hyperlipidemia  Encouraged to continue to work on her diet and exercise plan.  Updated labs will be drawn at her return.       Endocrine and Metabolic    Vitamin D deficiency       Health Encounters    Healthcare maintenance  Recommended a flu shot.  Patient is going to defer until she  is feeling a little better       Hematology and Neoplasia    Iron deficiency anemia       Mental Health    Generalized anxiety disorder  Significant situational component.   Supportive therapy.   Continue current medication.  Encouraged to report if any worse or if any new symptoms or concerns.    Relevant Medications    escitalopram (LEXAPRO) 20 MG tablet       Neuro    DDD (degenerative disc disease), lumbar    Migraine without aura and without status migrainosus, not intractable  Chronic.  Stable.  Encouraged to report if this should change.    Relevant Medications    escitalopram (LEXAPRO) 20 MG tablet    Restless leg syndrome       Pulmonary and Pneumonias    Mild persistent asthma without complication  Mild persistent asthma. The patient is not currently having an exacerbation. In general, the patient's disease is well controlled.  Encouraged to report if this should change.  Continue current medication    Relevant Medications    albuterol sulfate  (90 Base) MCG/ACT inhaler    cetirizine (zyrTEC) 10 MG tablet    montelukast (SINGULAIR) 10 MG tablet       Other    History of COVID-19  With chest wall pain  Encouraged to report if any worse, any new symptoms, or if not resolving over the next few weeks     Diagnoses         Codes Comments    Chronic allergic rhinitis    -  Primary ICD-10-CM: J30.9  ICD-9-CM: 477.9     Mixed hyperlipidemia     ICD-10-CM: E78.2  ICD-9-CM: 272.2     Vitamin D deficiency     ICD-10-CM: E55.9  ICD-9-CM: 268.9     Other iron deficiency anemia     ICD-10-CM: D50.8  ICD-9-CM: 280.8     Generalized anxiety disorder     ICD-10-CM: F41.1  ICD-9-CM: 300.02     Restless leg syndrome     ICD-10-CM: G25.81  ICD-9-CM: 333.94     Migraine without aura and without status migrainosus, not intractable     ICD-10-CM: G43.009  ICD-9-CM: 346.10     DDD (degenerative disc disease), lumbar     ICD-10-CM: M51.36  ICD-9-CM: 722.52     Mild persistent asthma without complication     ICD-10-CM:  J45.30  ICD-9-CM: 493.90     Healthcare maintenance     ICD-10-CM: Z00.00  ICD-9-CM: V70.0     History of COVID-19     ICD-10-CM: Z86.16  ICD-9-CM: V12.09

## 2024-02-02 DIAGNOSIS — F41.1 GENERALIZED ANXIETY DISORDER: ICD-10-CM

## 2024-02-02 DIAGNOSIS — J45.30 MILD PERSISTENT ASTHMA WITHOUT COMPLICATION: ICD-10-CM

## 2024-02-02 DIAGNOSIS — J30.9 CHRONIC ALLERGIC RHINITIS: ICD-10-CM

## 2024-02-02 RX ORDER — MONTELUKAST SODIUM 10 MG/1
10 TABLET ORAL NIGHTLY
Qty: 30 TABLET | Refills: 5 | Status: SHIPPED | OUTPATIENT
Start: 2024-02-02

## 2024-02-02 RX ORDER — ESCITALOPRAM OXALATE 20 MG/1
20 TABLET ORAL DAILY
Qty: 30 TABLET | Refills: 5 | Status: SHIPPED | OUTPATIENT
Start: 2024-02-02

## 2024-06-25 ENCOUNTER — OFFICE VISIT (OUTPATIENT)
Dept: FAMILY MEDICINE CLINIC | Facility: CLINIC | Age: 39
End: 2024-06-25
Payer: COMMERCIAL

## 2024-06-25 DIAGNOSIS — Z3A.18 18 WEEKS GESTATION OF PREGNANCY: ICD-10-CM

## 2024-06-25 DIAGNOSIS — J30.9 CHRONIC ALLERGIC RHINITIS: Primary | ICD-10-CM

## 2024-06-25 DIAGNOSIS — F41.1 GENERALIZED ANXIETY DISORDER: ICD-10-CM

## 2024-06-25 DIAGNOSIS — D50.8 OTHER IRON DEFICIENCY ANEMIA: ICD-10-CM

## 2024-06-25 DIAGNOSIS — E78.2 MIXED HYPERLIPIDEMIA: ICD-10-CM

## 2024-06-25 DIAGNOSIS — Z00.00 HEALTHCARE MAINTENANCE: ICD-10-CM

## 2024-06-25 DIAGNOSIS — E55.9 VITAMIN D DEFICIENCY: ICD-10-CM

## 2024-06-25 DIAGNOSIS — G25.81 RESTLESS LEG SYNDROME: ICD-10-CM

## 2024-06-25 DIAGNOSIS — G43.009 MIGRAINE WITHOUT AURA AND WITHOUT STATUS MIGRAINOSUS, NOT INTRACTABLE: ICD-10-CM

## 2024-06-25 DIAGNOSIS — J45.30 MILD PERSISTENT ASTHMA WITHOUT COMPLICATION: ICD-10-CM

## 2024-06-25 DIAGNOSIS — M51.36 DDD (DEGENERATIVE DISC DISEASE), LUMBAR: ICD-10-CM

## 2024-06-25 PROBLEM — Z86.16 HISTORY OF COVID-19: Status: RESOLVED | Noted: 2023-12-11 | Resolved: 2024-06-25

## 2024-06-25 PROCEDURE — 99214 OFFICE O/P EST MOD 30 MIN: CPT | Performed by: GENERAL PRACTICE

## 2024-06-25 NOTE — PROGRESS NOTES
Subjective   Zhanna Veras is a 38 y.o. female.     Chief Complaint  Pregnancy    History of Present Illness     Pregnancy  She is at approximately 18 weeks.  She has had no problems thus far, and recent genetic testing was unremarkable.  She is being followed by [facility].  She continues on her regular medications with the addition of a prenatal vitamin    Migraine Headache  She gives a long history of migraine headaches.  She is currently having one every week or two.  There has been no change in their quality or severity, nor any new associated symptoms.  She describes the pain as a throbbing uni-frontal or bi-frontal pain.  There is no history of any warning symptoms, but most are associated with photophobia, presbyphobia, and nausea.  She continues to deny any other visual disturbances, and there is no history of any weakness, numbness, or tingling.  She has been unable to identify any precipitating or exacerbating factors.  She continues on ibuprofen and acetaminophen, and with a nap, most headaches are generally relieved in 20 to 30 minutes    Chronic Anxiety  She has a long history of intermittent nervousness, worrying, and difficulty sleeping.  When severe, these symptoms have been associated with mild depression.  She is taking escitalopram and feels that it is helped significantly with no apparent side effects     Chronic Allergic Rhinitis  She has a long history of intermittent sneezing, rhinorrhea, nasal congestion, and postnasal drip.  There is no history of any other upper respiratory tract symptoms.  She remains on cetirizine and montelukast with good effect    Asthma  She has a long history of intermittent cough, shortness of breath, and wheezing.  There is no history of any chest pain or hemoptysis.  With montelukast she continues to use her albuterol once or twice weekly at the most    The following portions of the patient's history were reviewed and updated as appropriate: allergies,  current medications, past medical history, past social history, and problem list.    Review of Systems   Constitutional:  Positive for fatigue. Negative for chills and fever.   HENT:  Positive for congestion, rhinorrhea and sneezing. Negative for ear pain, hearing loss, postnasal drip, sinus pressure, sore throat and voice change.    Eyes:  Negative for visual disturbance.   Respiratory:  Negative for cough, shortness of breath and wheezing.    Cardiovascular:  Negative for chest pain, palpitations and leg swelling.   Gastrointestinal:  Negative for abdominal pain, blood in stool, constipation, diarrhea, nausea, vomiting and GERD.   Genitourinary:  Positive for amenorrhea and urinary incontinence (with coughing, snezzing etc). Negative for dysuria, frequency, hematuria and pelvic pain.   Musculoskeletal:  Positive for arthralgias (occasional aches and pains). Negative for back pain, joint swelling and myalgias.   Skin:  Negative for rash.   Neurological:  Positive for headache. Negative for dizziness, weakness and numbness.   Psychiatric/Behavioral:  Positive for sleep disturbance and stress. Negative for decreased concentration, suicidal ideas and depressed mood. The patient is not nervous/anxious.      Objective   Physical Exam  Constitutional:       General: She is not in acute distress.     Appearance: Normal appearance. She is well-developed. She is not diaphoretic.      Comments: Bright and in good spirits. No apparent distress. No pallor, jaundice, diaphoresis, or cyanosis.   HENT:      Head: Atraumatic.      Right Ear: Tympanic membrane, ear canal and external ear normal.      Left Ear: Tympanic membrane, ear canal and external ear normal.      Mouth/Throat:      Lips: No lesions.      Mouth: Mucous membranes are moist. No oral lesions.      Pharynx: No oropharyngeal exudate or posterior oropharyngeal erythema.   Eyes:      General: Lids are normal.      Extraocular Movements: Extraocular movements intact.       Conjunctiva/sclera: Conjunctivae normal.      Pupils: Pupils are equal.   Neck:      Thyroid: No thyroid mass or thyromegaly.      Vascular: No carotid bruit or JVD.      Trachea: Trachea normal. No tracheal deviation.   Cardiovascular:      Rate and Rhythm: Regular rhythm. Tachycardia present.      Heart sounds: Normal heart sounds, S1 normal and S2 normal. No murmur heard.     No gallop.   Pulmonary:      Effort: Pulmonary effort is normal.      Breath sounds: Normal breath sounds.   Abdominal:      General: Bowel sounds are normal.   Musculoskeletal:      Right lower leg: No edema.      Left lower leg: No edema.   Lymphadenopathy:      Head:      Right side of head: No submental, submandibular, tonsillar, preauricular, posterior auricular or occipital adenopathy.      Left side of head: No submental, submandibular, tonsillar, preauricular, posterior auricular or occipital adenopathy.      Cervical: No cervical adenopathy.      Upper Body:      Right upper body: No supraclavicular adenopathy.      Left upper body: No supraclavicular adenopathy.   Skin:     General: Skin is warm.      Coloration: Skin is not cyanotic, jaundiced or pale.      Findings: No rash.      Nails: There is no clubbing.   Neurological:      Mental Status: She is alert and oriented to person, place, and time.      Cranial Nerves: No cranial nerve deficit, dysarthria or facial asymmetry.      Sensory: No sensory deficit.      Motor: No tremor.      Coordination: Coordination normal.      Gait: Gait normal.   Psychiatric:         Attention and Perception: Attention normal.         Mood and Affect: Mood normal.         Speech: Speech normal.         Behavior: Behavior normal.         Thought Content: Thought content normal.       Assessment & Plan   Problems Addressed this Visit          Allergies and Adverse Reactions    Chronic allergic rhinitis   Continue current medication  Encouraged to report if any worse or if any new symptoms or  concerns.    Relevant Medications    cetirizine (zyrTEC) 10 MG tablet    montelukast (SINGULAIR) 10 MG tablet       Cardiac and Vasculature    Mixed hyperlipidemia  Updated labs will be arranged at return.       Endocrine and Metabolic    Vitamin D deficiency       Gravid and     18 weeks gestation of pregnancy  Appears to be doing well  Follow-up with OB       Health Encounters    Healthcare maintenance  Reminded to get a flu shot when available.       Hematology and Neoplasia    Iron deficiency anemia       Mental Health    Generalized anxiety disorder  Significant situational component.   Supportive therapy.   Continue current medication.  Encouraged to report if any worse or if any new symptoms or concerns.    Relevant Medications    escitalopram (LEXAPRO) 20 MG tablet       Neuro    DDD (degenerative disc disease), lumbar    Migraine without aura and without status migrainosus, not intractable  Chronic.  Stable.  Continue current medication  Encouraged to report if any worse or if any new symptoms or concerns.    Relevant Medications    escitalopram (LEXAPRO) 20 MG tablet    Restless leg syndrome       Pulmonary and Pneumonias    Mild persistent asthma without complication  Mild persistent asthma. The patient is not currently having an exacerbation. In general, the patient's disease is well controlled.  Encouraged to report if this should change.  Continue current medication    Relevant Medications    albuterol sulfate  (90 Base) MCG/ACT inhaler    cetirizine (zyrTEC) 10 MG tablet    montelukast (SINGULAIR) 10 MG tablet     Diagnoses         Codes Comments    Chronic allergic rhinitis    -  Primary ICD-10-CM: J30.9  ICD-9-CM: 477.9     Mixed hyperlipidemia     ICD-10-CM: E78.2  ICD-9-CM: 272.2     Vitamin D deficiency     ICD-10-CM: E55.9  ICD-9-CM: 268.9     Healthcare maintenance     ICD-10-CM: Z00.00  ICD-9-CM: V70.0     Other iron deficiency anemia     ICD-10-CM: D50.8  ICD-9-CM: 280.8      Generalized anxiety disorder     ICD-10-CM: F41.1  ICD-9-CM: 300.02     Restless leg syndrome     ICD-10-CM: G25.81  ICD-9-CM: 333.94     Migraine without aura and without status migrainosus, not intractable     ICD-10-CM: G43.009  ICD-9-CM: 346.10     DDD (degenerative disc disease), lumbar     ICD-10-CM: M51.36  ICD-9-CM: 722.52     Mild persistent asthma without complication     ICD-10-CM: J45.30  ICD-9-CM: 493.90     18 weeks gestation of pregnancy     ICD-10-CM: Z3A.18  ICD-9-CM: V22.2

## 2024-06-26 VITALS
TEMPERATURE: 97.6 F | DIASTOLIC BLOOD PRESSURE: 50 MMHG | BODY MASS INDEX: 27.31 KG/M2 | HEIGHT: 64 IN | OXYGEN SATURATION: 100 % | SYSTOLIC BLOOD PRESSURE: 98 MMHG | WEIGHT: 160 LBS | HEART RATE: 104 BPM | RESPIRATION RATE: 14 BRPM

## 2024-06-26 PROBLEM — Z3A.18 18 WEEKS GESTATION OF PREGNANCY: Status: ACTIVE | Noted: 2024-06-26

## 2024-06-26 RX ORDER — ESCITALOPRAM OXALATE 20 MG/1
20 TABLET ORAL DAILY
Qty: 30 TABLET | Refills: 5 | Status: SHIPPED | OUTPATIENT
Start: 2024-06-26

## 2024-06-26 RX ORDER — MONTELUKAST SODIUM 10 MG/1
10 TABLET ORAL NIGHTLY
Qty: 30 TABLET | Refills: 5 | Status: SHIPPED | OUTPATIENT
Start: 2024-06-26

## 2024-06-26 RX ORDER — ALBUTEROL SULFATE 90 UG/1
2 AEROSOL, METERED RESPIRATORY (INHALATION) EVERY 4 HOURS PRN
Qty: 18 G | Refills: 5 | Status: SHIPPED | OUTPATIENT
Start: 2024-06-26

## 2024-06-26 RX ORDER — CETIRIZINE HYDROCHLORIDE 10 MG/1
10 TABLET ORAL
Qty: 30 TABLET | Refills: 5 | Status: SHIPPED | OUTPATIENT
Start: 2024-06-26

## 2024-11-12 ENCOUNTER — READMISSION MANAGEMENT (OUTPATIENT)
Dept: CALL CENTER | Facility: HOSPITAL | Age: 39
End: 2024-11-12
Payer: COMMERCIAL

## 2024-11-13 ENCOUNTER — HOSPITAL ENCOUNTER (OUTPATIENT)
Dept: LABOR AND DELIVERY | Facility: HOSPITAL | Age: 39
Discharge: HOME | End: 2024-11-13
Payer: COMMERCIAL

## 2024-11-13 ENCOUNTER — HOSPITAL ENCOUNTER (INPATIENT)
Facility: HOSPITAL | Age: 39
LOS: 3 days | Discharge: HOME | End: 2024-11-16
Attending: OBSTETRICS & GYNECOLOGY | Admitting: OBSTETRICS & GYNECOLOGY
Payer: COMMERCIAL

## 2024-11-13 PROBLEM — Z34.90 PREGNANCY: Status: ACTIVE | Noted: 2024-11-13

## 2024-11-13 LAB
ABO GROUP BLD: NORMAL
AMPHET+METHAMPHET UR QL: NEGATIVE
AMPHETAMINES UR QL: NEGATIVE
BARBITURATES UR QL SCN: NEGATIVE
BASOPHILS # BLD AUTO: 0.02 10*3/MM3 (ref 0–0.2)
BASOPHILS NFR BLD AUTO: 0.3 % (ref 0–1.5)
BENZODIAZ UR QL SCN: NEGATIVE
BLD GP AB SCN SERPL QL: NEGATIVE
BUPRENORPHINE SERPL-MCNC: NEGATIVE NG/ML
CANNABINOIDS SERPL QL: NEGATIVE
COCAINE UR QL: NEGATIVE
DEPRECATED RDW RBC AUTO: 47.5 FL (ref 37–54)
EOSINOPHIL # BLD AUTO: 0.06 10*3/MM3 (ref 0–0.4)
EOSINOPHIL NFR BLD AUTO: 0.9 % (ref 0.3–6.2)
ERYTHROCYTE [DISTWIDTH] IN BLOOD BY AUTOMATED COUNT: 14.3 % (ref 12.3–15.4)
FENTANYL UR-MCNC: NEGATIVE NG/ML
HCT VFR BLD AUTO: 33 % (ref 34–46.6)
HGB BLD-MCNC: 10.9 G/DL (ref 12–15.9)
IMM GRANULOCYTES # BLD AUTO: 0.03 10*3/MM3 (ref 0–0.05)
IMM GRANULOCYTES NFR BLD AUTO: 0.5 % (ref 0–0.5)
LYMPHOCYTES # BLD AUTO: 1.46 10*3/MM3 (ref 0.7–3.1)
LYMPHOCYTES NFR BLD AUTO: 22 % (ref 19.6–45.3)
MCH RBC QN AUTO: 30.3 PG (ref 26.6–33)
MCHC RBC AUTO-ENTMCNC: 33 G/DL (ref 31.5–35.7)
MCV RBC AUTO: 91.7 FL (ref 79–97)
METHADONE UR QL SCN: NEGATIVE
MONOCYTES # BLD AUTO: 0.49 10*3/MM3 (ref 0.1–0.9)
MONOCYTES NFR BLD AUTO: 7.4 % (ref 5–12)
NEUTROPHILS NFR BLD AUTO: 4.57 10*3/MM3 (ref 1.7–7)
NEUTROPHILS NFR BLD AUTO: 68.9 % (ref 42.7–76)
NRBC BLD AUTO-RTO: 0 /100 WBC (ref 0–0.2)
OPIATES UR QL: NEGATIVE
OXYCODONE UR QL SCN: NEGATIVE
PCP UR QL SCN: NEGATIVE
PLATELET # BLD AUTO: 158 10*3/MM3 (ref 140–450)
PMV BLD AUTO: 10.6 FL (ref 6–12)
RBC # BLD AUTO: 3.6 10*6/MM3 (ref 3.77–5.28)
RH BLD: POSITIVE
T&S EXPIRATION DATE: NORMAL
TRICYCLICS UR QL SCN: NEGATIVE
WBC NRBC COR # BLD AUTO: 6.63 10*3/MM3 (ref 3.4–10.8)

## 2024-11-13 PROCEDURE — 80307 DRUG TEST PRSMV CHEM ANLYZR: CPT | Performed by: OBSTETRICS & GYNECOLOGY

## 2024-11-13 PROCEDURE — G0463 HOSPITAL OUTPT CLINIC VISIT: HCPCS

## 2024-11-13 PROCEDURE — 86850 RBC ANTIBODY SCREEN: CPT | Performed by: OBSTETRICS & GYNECOLOGY

## 2024-11-13 PROCEDURE — 86780 TREPONEMA PALLIDUM: CPT | Performed by: OBSTETRICS & GYNECOLOGY

## 2024-11-13 PROCEDURE — 86901 BLOOD TYPING SEROLOGIC RH(D): CPT | Performed by: OBSTETRICS & GYNECOLOGY

## 2024-11-13 PROCEDURE — 85025 COMPLETE CBC W/AUTO DIFF WBC: CPT | Performed by: OBSTETRICS & GYNECOLOGY

## 2024-11-13 PROCEDURE — 59025 FETAL NON-STRESS TEST: CPT

## 2024-11-13 PROCEDURE — 86900 BLOOD TYPING SEROLOGIC ABO: CPT | Performed by: OBSTETRICS & GYNECOLOGY

## 2024-11-13 RX ORDER — ONDANSETRON 4 MG/1
4 TABLET, ORALLY DISINTEGRATING ORAL EVERY 6 HOURS PRN
Status: DISCONTINUED | OUTPATIENT
Start: 2024-11-13 | End: 2024-11-14 | Stop reason: HOSPADM

## 2024-11-13 RX ORDER — MISOPROSTOL 100 UG/1
25 TABLET ORAL EVERY 4 HOURS PRN
Status: DISCONTINUED | OUTPATIENT
Start: 2024-11-13 | End: 2024-11-16 | Stop reason: HOSPADM

## 2024-11-13 RX ORDER — TERBUTALINE SULFATE 1 MG/ML
0.2 INJECTION, SOLUTION SUBCUTANEOUS AS NEEDED
Status: DISCONTINUED | OUTPATIENT
Start: 2024-11-13 | End: 2024-11-14 | Stop reason: HOSPADM

## 2024-11-13 RX ORDER — BUTORPHANOL TARTRATE 1 MG/ML
1 INJECTION, SOLUTION INTRAMUSCULAR; INTRAVENOUS
Status: DISCONTINUED | OUTPATIENT
Start: 2024-11-13 | End: 2024-11-14 | Stop reason: HOSPADM

## 2024-11-13 RX ORDER — SODIUM CHLORIDE, SODIUM LACTATE, POTASSIUM CHLORIDE, CALCIUM CHLORIDE 600; 310; 30; 20 MG/100ML; MG/100ML; MG/100ML; MG/100ML
30 INJECTION, SOLUTION INTRAVENOUS CONTINUOUS
Status: ACTIVE | OUTPATIENT
Start: 2024-11-14 | End: 2024-11-15

## 2024-11-13 RX ORDER — ACETAMINOPHEN 325 MG/1
650 TABLET ORAL EVERY 4 HOURS PRN
Status: DISCONTINUED | OUTPATIENT
Start: 2024-11-13 | End: 2024-11-14 | Stop reason: HOSPADM

## 2024-11-13 RX ORDER — ONDANSETRON 2 MG/ML
4 INJECTION INTRAMUSCULAR; INTRAVENOUS EVERY 6 HOURS PRN
Status: DISCONTINUED | OUTPATIENT
Start: 2024-11-13 | End: 2024-11-14 | Stop reason: HOSPADM

## 2024-11-13 RX ORDER — HYDROXYZINE HYDROCHLORIDE 25 MG/1
50 TABLET, FILM COATED ORAL NIGHTLY PRN
Status: DISCONTINUED | OUTPATIENT
Start: 2024-11-13 | End: 2024-11-14 | Stop reason: HOSPADM

## 2024-11-13 RX ORDER — MAGNESIUM HYDROXIDE 1200 MG/15ML
1000 LIQUID ORAL ONCE AS NEEDED
Status: DISCONTINUED | OUTPATIENT
Start: 2024-11-13 | End: 2024-11-14 | Stop reason: HOSPADM

## 2024-11-13 RX ORDER — SODIUM CHLORIDE 9 MG/ML
40 INJECTION, SOLUTION INTRAVENOUS AS NEEDED
Status: DISCONTINUED | OUTPATIENT
Start: 2024-11-13 | End: 2024-11-14 | Stop reason: HOSPADM

## 2024-11-13 RX ORDER — SODIUM CHLORIDE 0.9 % (FLUSH) 0.9 %
10 SYRINGE (ML) INJECTION AS NEEDED
Status: DISCONTINUED | OUTPATIENT
Start: 2024-11-13 | End: 2024-11-14 | Stop reason: HOSPADM

## 2024-11-13 NOTE — OUTREACH NOTE
Prep Survey      Flowsheet Row Responses   [facility] facility patient discharged from? Non-BH   Is LACE score < 7 ? Non-BH Discharge   Eligibility Not Eligible   What are the reasons patient is not eligible? Other  [OB]   Does the patient have one of the following disease processes/diagnoses(primary or secondary)? Other   Prep survey completed? Yes            Sabina Wilks Registered Nurse

## 2024-11-14 ENCOUNTER — ANESTHESIA EVENT (OUTPATIENT)
Dept: LABOR AND DELIVERY | Facility: HOSPITAL | Age: 39
End: 2024-11-14
Payer: COMMERCIAL

## 2024-11-14 ENCOUNTER — ANESTHESIA (OUTPATIENT)
Dept: LABOR AND DELIVERY | Facility: HOSPITAL | Age: 39
End: 2024-11-14
Payer: COMMERCIAL

## 2024-11-14 LAB — TREPONEMA PALLIDUM IGG+IGM AB [PRESENCE] IN SERUM OR PLASMA BY IMMUNOASSAY: NORMAL

## 2024-11-14 PROCEDURE — 10907ZC DRAINAGE OF AMNIOTIC FLUID, THERAPEUTIC FROM PRODUCTS OF CONCEPTION, VIA NATURAL OR ARTIFICIAL OPENING: ICD-10-PCS | Performed by: OBSTETRICS & GYNECOLOGY

## 2024-11-14 PROCEDURE — 25810000003 LACTATED RINGERS SOLUTION: Performed by: OBSTETRICS & GYNECOLOGY

## 2024-11-14 PROCEDURE — 25010000002 BUPIVACAINE (PF) 0.25 % SOLUTION: Performed by: ANESTHESIOLOGY

## 2024-11-14 PROCEDURE — 3E033VJ INTRODUCTION OF OTHER HORMONE INTO PERIPHERAL VEIN, PERCUTANEOUS APPROACH: ICD-10-PCS | Performed by: OBSTETRICS & GYNECOLOGY

## 2024-11-14 PROCEDURE — C1755 CATHETER, INTRASPINAL: HCPCS

## 2024-11-14 PROCEDURE — 59025 FETAL NON-STRESS TEST: CPT

## 2024-11-14 RX ORDER — CETIRIZINE HYDROCHLORIDE 10 MG/1
10 TABLET ORAL NIGHTLY
COMMUNITY

## 2024-11-14 RX ORDER — IBUPROFEN 800 MG/1
800 TABLET, FILM COATED ORAL EVERY 8 HOURS SCHEDULED
Status: DISCONTINUED | OUTPATIENT
Start: 2024-11-14 | End: 2024-11-14 | Stop reason: HOSPADM

## 2024-11-14 RX ORDER — OXYTOCIN/0.9 % SODIUM CHLORIDE 30/500 ML
250 PLASTIC BAG, INJECTION (ML) INTRAVENOUS CONTINUOUS
Status: ACTIVE | OUTPATIENT
Start: 2024-11-14 | End: 2024-11-14

## 2024-11-14 RX ORDER — ACETAMINOPHEN 325 MG/1
650 TABLET ORAL EVERY 4 HOURS PRN
Status: DISCONTINUED | OUTPATIENT
Start: 2024-11-14 | End: 2024-11-14 | Stop reason: HOSPADM

## 2024-11-14 RX ORDER — CALCIUM CARBONATE 500 MG/1
2 TABLET, CHEWABLE ORAL 3 TIMES DAILY PRN
COMMUNITY

## 2024-11-14 RX ORDER — ONDANSETRON 2 MG/ML
4 INJECTION INTRAMUSCULAR; INTRAVENOUS ONCE AS NEEDED
Status: DISCONTINUED | OUTPATIENT
Start: 2024-11-14 | End: 2024-11-14 | Stop reason: HOSPADM

## 2024-11-14 RX ORDER — HYDROCORTISONE 25 MG/G
1 CREAM TOPICAL AS NEEDED
Status: DISCONTINUED | OUTPATIENT
Start: 2024-11-14 | End: 2024-11-16 | Stop reason: HOSPADM

## 2024-11-14 RX ORDER — DOCUSATE SODIUM 100 MG/1
100 CAPSULE, LIQUID FILLED ORAL 2 TIMES DAILY
Status: DISCONTINUED | OUTPATIENT
Start: 2024-11-14 | End: 2024-11-16 | Stop reason: HOSPADM

## 2024-11-14 RX ORDER — KETOROLAC TROMETHAMINE 30 MG/ML
30 INJECTION, SOLUTION INTRAMUSCULAR; INTRAVENOUS EVERY 6 HOURS PRN
Status: ACTIVE | OUTPATIENT
Start: 2024-11-14 | End: 2024-11-14

## 2024-11-14 RX ORDER — OXYTOCIN/0.9 % SODIUM CHLORIDE 30/500 ML
2-20 PLASTIC BAG, INJECTION (ML) INTRAVENOUS
Status: DISCONTINUED | OUTPATIENT
Start: 2024-11-14 | End: 2024-11-16 | Stop reason: HOSPADM

## 2024-11-14 RX ORDER — ONDANSETRON 4 MG/1
4 TABLET, ORALLY DISINTEGRATING ORAL EVERY 6 HOURS PRN
Status: DISCONTINUED | OUTPATIENT
Start: 2024-11-14 | End: 2024-11-16 | Stop reason: HOSPADM

## 2024-11-14 RX ORDER — CETIRIZINE HYDROCHLORIDE 10 MG/1
10 TABLET ORAL NIGHTLY
Status: DISCONTINUED | OUTPATIENT
Start: 2024-11-14 | End: 2024-11-16 | Stop reason: HOSPADM

## 2024-11-14 RX ORDER — ESCITALOPRAM OXALATE 10 MG/1
20 TABLET ORAL NIGHTLY
Status: DISCONTINUED | OUTPATIENT
Start: 2024-11-14 | End: 2024-11-16 | Stop reason: HOSPADM

## 2024-11-14 RX ORDER — MISOPROSTOL 100 UG/1
600 TABLET ORAL AS NEEDED
Status: DISCONTINUED | OUTPATIENT
Start: 2024-11-14 | End: 2024-11-14 | Stop reason: HOSPADM

## 2024-11-14 RX ORDER — BISACODYL 10 MG
10 SUPPOSITORY, RECTAL RECTAL DAILY PRN
Status: DISCONTINUED | OUTPATIENT
Start: 2024-11-15 | End: 2024-11-16 | Stop reason: HOSPADM

## 2024-11-14 RX ORDER — CARBOPROST TROMETHAMINE 250 UG/ML
250 INJECTION, SOLUTION INTRAMUSCULAR AS NEEDED
Status: DISCONTINUED | OUTPATIENT
Start: 2024-11-14 | End: 2024-11-14 | Stop reason: HOSPADM

## 2024-11-14 RX ORDER — CEPHALEXIN 500 MG/1
500 CAPSULE ORAL 4 TIMES DAILY
COMMUNITY
Start: 2024-11-12 | End: 2024-11-19

## 2024-11-14 RX ORDER — HYDROCODONE BITARTRATE AND ACETAMINOPHEN 10; 325 MG/1; MG/1
1 TABLET ORAL EVERY 4 HOURS PRN
Status: DISCONTINUED | OUTPATIENT
Start: 2024-11-14 | End: 2024-11-16 | Stop reason: HOSPADM

## 2024-11-14 RX ORDER — SODIUM CHLORIDE 0.9 % (FLUSH) 0.9 %
10 SYRINGE (ML) INJECTION AS NEEDED
Status: DISCONTINUED | OUTPATIENT
Start: 2024-11-14 | End: 2024-11-16 | Stop reason: HOSPADM

## 2024-11-14 RX ORDER — DIPHENHYDRAMINE HCL 25 MG
25 CAPSULE ORAL NIGHTLY PRN
Status: DISCONTINUED | OUTPATIENT
Start: 2024-11-14 | End: 2024-11-16 | Stop reason: HOSPADM

## 2024-11-14 RX ORDER — FAMOTIDINE 10 MG/ML
20 INJECTION, SOLUTION INTRAVENOUS ONCE AS NEEDED
Status: DISCONTINUED | OUTPATIENT
Start: 2024-11-14 | End: 2024-11-14 | Stop reason: HOSPADM

## 2024-11-14 RX ORDER — IBUPROFEN 600 MG/1
600 TABLET, FILM COATED ORAL EVERY 6 HOURS PRN
Status: DISCONTINUED | OUTPATIENT
Start: 2024-11-14 | End: 2024-11-16 | Stop reason: HOSPADM

## 2024-11-14 RX ORDER — SODIUM CHLORIDE 0.9 % (FLUSH) 0.9 %
10 SYRINGE (ML) INJECTION EVERY 12 HOURS SCHEDULED
Status: DISCONTINUED | OUTPATIENT
Start: 2024-11-14 | End: 2024-11-16 | Stop reason: HOSPADM

## 2024-11-14 RX ORDER — OXYTOCIN/0.9 % SODIUM CHLORIDE 30/500 ML
125 PLASTIC BAG, INJECTION (ML) INTRAVENOUS ONCE AS NEEDED
Status: DISCONTINUED | OUTPATIENT
Start: 2024-11-14 | End: 2024-11-16 | Stop reason: HOSPADM

## 2024-11-14 RX ORDER — MISOPROSTOL 100 UG/1
600 TABLET ORAL ONCE AS NEEDED
Status: DISCONTINUED | OUTPATIENT
Start: 2024-11-14 | End: 2024-11-16 | Stop reason: HOSPADM

## 2024-11-14 RX ORDER — METHYLERGONOVINE MALEATE 0.2 MG/ML
200 INJECTION INTRAVENOUS ONCE AS NEEDED
Status: DISCONTINUED | OUTPATIENT
Start: 2024-11-14 | End: 2024-11-16 | Stop reason: HOSPADM

## 2024-11-14 RX ORDER — CALCIUM CARBONATE 500 MG/1
2 TABLET, CHEWABLE ORAL 3 TIMES DAILY PRN
Status: DISCONTINUED | OUTPATIENT
Start: 2024-11-14 | End: 2024-11-16 | Stop reason: HOSPADM

## 2024-11-14 RX ORDER — METHYLERGONOVINE MALEATE 0.2 MG/ML
200 INJECTION INTRAVENOUS ONCE AS NEEDED
Status: DISCONTINUED | OUTPATIENT
Start: 2024-11-14 | End: 2024-11-14 | Stop reason: HOSPADM

## 2024-11-14 RX ORDER — CEPHALEXIN 500 MG/1
500 CAPSULE ORAL 4 TIMES DAILY
Status: DISCONTINUED | OUTPATIENT
Start: 2024-11-14 | End: 2024-11-16 | Stop reason: HOSPADM

## 2024-11-14 RX ORDER — HYDROCODONE BITARTRATE AND ACETAMINOPHEN 5; 325 MG/1; MG/1
1 TABLET ORAL EVERY 4 HOURS PRN
Status: DISCONTINUED | OUTPATIENT
Start: 2024-11-14 | End: 2024-11-16 | Stop reason: HOSPADM

## 2024-11-14 RX ORDER — HYDROCODONE BITARTRATE AND ACETAMINOPHEN 5; 325 MG/1; MG/1
1 TABLET ORAL EVERY 4 HOURS PRN
Status: DISCONTINUED | OUTPATIENT
Start: 2024-11-14 | End: 2024-11-14 | Stop reason: HOSPADM

## 2024-11-14 RX ORDER — ROPIVACAINE HYDROCHLORIDE 2 MG/ML
14 INJECTION, SOLUTION EPIDURAL; INFILTRATION; PERINEURAL CONTINUOUS
Status: DISCONTINUED | OUTPATIENT
Start: 2024-11-14 | End: 2024-11-16 | Stop reason: HOSPADM

## 2024-11-14 RX ORDER — SODIUM CHLORIDE 9 MG/ML
40 INJECTION, SOLUTION INTRAVENOUS AS NEEDED
Status: DISCONTINUED | OUTPATIENT
Start: 2024-11-14 | End: 2024-11-16 | Stop reason: HOSPADM

## 2024-11-14 RX ORDER — PRENATAL VIT/IRON FUM/FOLIC AC 27MG-0.8MG
1 TABLET ORAL NIGHTLY
Status: DISCONTINUED | OUTPATIENT
Start: 2024-11-14 | End: 2024-11-16 | Stop reason: HOSPADM

## 2024-11-14 RX ORDER — MONTELUKAST SODIUM 10 MG/1
10 TABLET ORAL NIGHTLY
Status: DISCONTINUED | OUTPATIENT
Start: 2024-11-14 | End: 2024-11-16 | Stop reason: HOSPADM

## 2024-11-14 RX ORDER — OXYTOCIN/0.9 % SODIUM CHLORIDE 30/500 ML
999 PLASTIC BAG, INJECTION (ML) INTRAVENOUS ONCE
Status: DISCONTINUED | OUTPATIENT
Start: 2024-11-14 | End: 2024-11-14 | Stop reason: HOSPADM

## 2024-11-14 RX ORDER — CARBOPROST TROMETHAMINE 250 UG/ML
250 INJECTION, SOLUTION INTRAMUSCULAR ONCE AS NEEDED
Status: DISCONTINUED | OUTPATIENT
Start: 2024-11-14 | End: 2024-11-16 | Stop reason: HOSPADM

## 2024-11-14 RX ORDER — SODIUM CHLORIDE, SODIUM LACTATE, POTASSIUM CHLORIDE, CALCIUM CHLORIDE 600; 310; 30; 20 MG/100ML; MG/100ML; MG/100ML; MG/100ML
125 INJECTION, SOLUTION INTRAVENOUS ONCE
Status: DISCONTINUED | OUTPATIENT
Start: 2024-11-14 | End: 2024-11-16 | Stop reason: HOSPADM

## 2024-11-14 RX ORDER — ONDANSETRON 2 MG/ML
4 INJECTION INTRAMUSCULAR; INTRAVENOUS EVERY 6 HOURS PRN
Status: DISCONTINUED | OUTPATIENT
Start: 2024-11-14 | End: 2024-11-16 | Stop reason: HOSPADM

## 2024-11-14 RX ORDER — ACETAMINOPHEN 325 MG/1
650 TABLET ORAL EVERY 6 HOURS PRN
Status: DISCONTINUED | OUTPATIENT
Start: 2024-11-14 | End: 2024-11-16 | Stop reason: HOSPADM

## 2024-11-14 RX ORDER — MONTELUKAST SODIUM 10 MG/1
10 TABLET ORAL NIGHTLY
COMMUNITY

## 2024-11-14 RX ORDER — BUPIVACAINE HYDROCHLORIDE 2.5 MG/ML
INJECTION, SOLUTION EPIDURAL; INFILTRATION; INTRACAUDAL AS NEEDED
Status: DISCONTINUED | OUTPATIENT
Start: 2024-11-14 | End: 2024-11-14 | Stop reason: SURG

## 2024-11-14 RX ORDER — EPHEDRINE SULFATE 5 MG/ML
10 INJECTION INTRAVENOUS
Status: DISCONTINUED | OUTPATIENT
Start: 2024-11-14 | End: 2024-11-14 | Stop reason: HOSPADM

## 2024-11-14 RX ORDER — MIDAZOLAM HYDROCHLORIDE 1 MG/ML
1 INJECTION, SOLUTION INTRAMUSCULAR; INTRAVENOUS
Status: DISCONTINUED | OUTPATIENT
Start: 2024-11-14 | End: 2024-11-16 | Stop reason: HOSPADM

## 2024-11-14 RX ORDER — SODIUM CHLORIDE 0.9 % (FLUSH) 0.9 %
1-10 SYRINGE (ML) INJECTION AS NEEDED
Status: DISCONTINUED | OUTPATIENT
Start: 2024-11-14 | End: 2024-11-16 | Stop reason: HOSPADM

## 2024-11-14 RX ORDER — PRENATAL VIT/IRON FUM/FOLIC AC 27MG-0.8MG
1 TABLET ORAL NIGHTLY
COMMUNITY

## 2024-11-14 RX ORDER — ESCITALOPRAM OXALATE 20 MG/1
20 TABLET ORAL NIGHTLY
COMMUNITY

## 2024-11-14 RX ORDER — MAGNESIUM OXIDE 400 MG/1
400 TABLET ORAL NIGHTLY
COMMUNITY

## 2024-11-14 RX ADMIN — Medication 2 MILLI-UNITS/MIN: at 07:33

## 2024-11-14 RX ADMIN — CETIRIZINE HYDROCHLORIDE 10 MG: 10 TABLET, FILM COATED ORAL at 22:03

## 2024-11-14 RX ADMIN — MISOPROSTOL 600 MCG: 100 TABLET ORAL at 12:47

## 2024-11-14 RX ADMIN — ESCITALOPRAM OXALATE 20 MG: 10 TABLET ORAL at 22:03

## 2024-11-14 RX ADMIN — SODIUM CHLORIDE, POTASSIUM CHLORIDE, SODIUM LACTATE AND CALCIUM CHLORIDE 1000 ML: 600; 310; 30; 20 INJECTION, SOLUTION INTRAVENOUS at 09:58

## 2024-11-14 RX ADMIN — Medication 400 MG: at 22:03

## 2024-11-14 RX ADMIN — DOCUSATE SODIUM 100 MG: 100 CAPSULE, LIQUID FILLED ORAL at 22:03

## 2024-11-14 RX ADMIN — IBUPROFEN 800 MG: 800 TABLET, FILM COATED ORAL at 14:50

## 2024-11-14 RX ADMIN — CEPHALEXIN 500 MG: 500 CAPSULE ORAL at 22:04

## 2024-11-14 RX ADMIN — PRENATAL VITAMINS-IRON FUMARATE 27 MG IRON-FOLIC ACID 0.8 MG TABLET 1 TABLET: at 22:03

## 2024-11-14 RX ADMIN — CEPHALEXIN 500 MG: 500 CAPSULE ORAL at 14:50

## 2024-11-14 RX ADMIN — ACETAMINOPHEN 650 MG: 325 TABLET ORAL at 19:45

## 2024-11-14 RX ADMIN — BUPIVACAINE HYDROCHLORIDE 10 ML: 2.5 INJECTION, SOLUTION EPIDURAL; INFILTRATION; INTRACAUDAL; PERINEURAL at 11:03

## 2024-11-14 RX ADMIN — MONTELUKAST SODIUM 10 MG: 10 TABLET, COATED ORAL at 22:04

## 2024-11-14 NOTE — L&D DELIVERY NOTE
Vaginal Delivery Procedure Note    Zhanna Veras  39w 0d  G 7,       OBGYN: Messi Armstrong MD      Pre-op Diagnosis: Complete Dilation      Anesthesia: Epidual        Detailed Description of Procedure     The patient was prepped and draped in normal sterile fashion. The head was delivered over an intact perineum. The nares and mouth were bulb suctioned. There was no nuchal cord. Anterior and posterior shoulders delivered without any problems. The rest of the infant was delivered in controlled fashion. The placenta delivered intact. The patient tolerated the procedure well and went to the recovery room in stable condition.        Maternal Blood Type: O   Fetal Gender: Male  Nuchal Cord: No  Tears: No    Amniotic Fluid Clear  Blood Cord: Yes  Estimated Blood Loss: 300cc  Placenta: Spontaneous, Delivered Intact   Uterus Explored: Yes  Membranes: AROM  APGARS: 8 & 9    Disposition: Transfer to Women's Health Floor  Condition: Stable    Messi Armstrong M.D     Date: 11/14/2024  Time: 12:51 EST

## 2024-11-14 NOTE — H&P
Chief complaint   Chief Complaint   Patient presents with    Scheduled Induction     Pt arrived to L&D for scheduled induction with cervical ripening       History of Present Illness: Patient is a 38 y.o. female who presents with IUP at 39w0d weeks gestation. G 7, P 4024. Oligohydramnios. BRIAN 3-5.        Past Medical History:   Diagnosis Date    Abdominal pain     ADHD (attention deficit hyperactivity disorder) 2017    Allergic 2018    Allergic rhinitis     Anemia 2020    Anxiety 16    Arthritis     osteo    Asthma     Cough     Depression     Ectopic pregnancy     Headache 2019    History of transfusion     HPV (human papilloma virus) infection     Malaise and fatigue     Respiratory abnormality     Urinary tract infection     Visual impairment 95    Have had glasses since 4th grade    Vitamin D deficiency      Blood Type: O   Fetal Gender: Male  GBS: Negative    Past Surgical History:   Procedure Laterality Date    DILATATION AND CURETTAGE      SALPINGECTOMY Right     WISDOM TOOTH EXTRACTION       Family History   Problem Relation Age of Onset    Asthma Mother     COPD Mother     Diabetes Mother     Hypertension Mother     Anxiety disorder Mother     Depression Mother     Hyperlipidemia Mother     Alcohol abuse Father     Cancer Father     Arthritis Paternal Grandmother     Cancer Paternal Grandmother     COPD Paternal Grandmother     Stroke Paternal Grandmother     Asthma Paternal Grandmother     Miscarriages / Stillbirths Paternal Grandmother         Last child was stillborn    Learning disabilities Son         ADHD/ODD    Learning disabilities Son         ADHD     Social History     Tobacco Use    Smoking status: Former     Current packs/day: 0.00     Average packs/day: 0.3 packs/day for 1 year (0.3 ttl pk-yrs)     Types: Cigarettes     Start date: 2005     Quit date: 2006     Years since quittin.4     Passive exposure: Never    Smokeless tobacco: Never   Vaping Use    Vaping status:  Never Used   Substance Use Topics    Alcohol use: No    Drug use: No     Medications Prior to Admission   Medication Sig Dispense Refill Last Dose/Taking    calcium carbonate (TUMS) 500 MG chewable tablet Chew 2 tablets 3 (Three) Times a Day As Needed for Indigestion or Heartburn.   11/13/2024 Noon    cephalexin (KEFLEX) 500 MG capsule Take 1 capsule by mouth 4 (Four) Times a Day.   11/13/2024 Evening    cetirizine (zyrTEC) 10 MG tablet Take 1 tablet by mouth Every Night.   Past Week Bedtime    escitalopram (LEXAPRO) 20 MG tablet Take 1 tablet by mouth Every Night.   Past Week Bedtime    magnesium oxide (MAG-OX) 400 MG tablet Take 1 tablet by mouth Every Night.   Past Week Bedtime    montelukast (SINGULAIR) 10 MG tablet Take 1 tablet by mouth Every Night.   Past Week Bedtime    Prenatal Vit-Fe Fumarate-FA (prenatal vitamin 27-0.8) 27-0.8 MG tablet tablet Take 1 tablet by mouth Every Night.   Past Week Bedtime     Allergies:  Aloe vera and Penicillins      Vital Signs  Temp:  [97 °F (36.1 °C)-98.1 °F (36.7 °C)] 97 °F (36.1 °C)  Heart Rate:  [72] 72  Resp:  [20] 20  BP: (115)/(63) 115/63    Radiology  Imaging Results (Last 24 Hours)       ** No results found for the last 24 hours. **            Labs  Lab Results (last 24 hours)       Procedure Component Value Units Date/Time    Urine Drug Screen - Urine, Clean Catch [[id number]]  (Normal) Collected: 11/13/24 2130    Specimen: Urine, Clean Catch Updated: 11/13/24 2157     THC, Screen, Urine Negative     Phencyclidine (PCP), Urine Negative     Cocaine Screen, Urine Negative     Methamphetamine, Ur Negative     Opiate Screen Negative     Amphetamine Screen, Urine Negative     Benzodiazepine Screen, Urine Negative     Tricyclic Antidepressants Screen Negative     Methadone Screen, Urine Negative     Barbiturates Screen, Urine Negative     Oxycodone Screen, Urine Negative     Buprenorphine, Screen, Urine Negative    Narrative:      Cutoff For Drugs Screened:    Amphetamines                500 ng/ml  Barbiturates               200 ng/ml  Benzodiazepines            150 ng/ml  Cocaine                    150 ng/ml  Methadone                  200 ng/ml  Opiates                    100 ng/ml  Phencyclidine               25 ng/ml  THC                         50 ng/ml  Methamphetamine            500 ng/ml  Tricyclic Antidepressants  300 ng/ml  Oxycodone                  100 ng/ml  Buprenorphine               10 ng/ml    The normal value for all drugs tested is negative. This report includes unconfirmed screening results, with the cutoff values listed, to be used for medical treatment purposes only.  Unconfirmed results must not be used for non-medical purposes such as employment or legal testing.  Clinical consideration should be applied to any drug of abuse test, particularly when unconfirmed results are used.      Fentanyl, Urine - Urine, Clean Catch [[id number]]  (Normal) Collected: 11/13/24 2130    Specimen: Urine, Clean Catch Updated: 11/13/24 2157     Fentanyl, Urine Negative    Narrative:      Negative Threshold:      Fentanyl 5 ng/mL     The normal value for the drug tested is negative. This report includes final unconfirmed screening results to be used for medical treatment purposes only. Unconfirmed results must not be used for non-medical purposes such as employment or legal testing. Clinical consideration should be applied to any drug of abuse test, particularly when unconfirmed results are used.           CBC & Differential [[id number]]  (Abnormal) Collected: 11/13/24 2134    Specimen: Blood Updated: 11/13/24 2146    Narrative:      The following orders were created for panel order CBC & Differential.  Procedure                               Abnormality         Status                     ---------                               -----------         ------                     CBC Auto Differential[[id number]]        Abnormal            Final result                 Please view results for  these tests on the individual orders.    CBC Auto Differential [[id number]]  (Abnormal) Collected: 11/13/24 2134    Specimen: Blood Updated: 11/13/24 2146     WBC 6.63 10*3/mm3      RBC 3.60 10*6/mm3      Hemoglobin 10.9 g/dL      Hematocrit 33.0 %      MCV 91.7 fL      MCH 30.3 pg      MCHC 33.0 g/dL      RDW 14.3 %      RDW-SD 47.5 fl      MPV 10.6 fL      Platelets 158 10*3/mm3      Neutrophil % 68.9 %      Lymphocyte % 22.0 %      Monocyte % 7.4 %      Eosinophil % 0.9 %      Basophil % 0.3 %      Immature Grans % 0.5 %      Neutrophils, Absolute 4.57 10*3/mm3      Lymphocytes, Absolute 1.46 10*3/mm3      Monocytes, Absolute 0.49 10*3/mm3      Eosinophils, Absolute 0.06 10*3/mm3      Basophils, Absolute 0.02 10*3/mm3      Immature Grans, Absolute 0.03 10*3/mm3      nRBC 0.0 /100 WBC     Treponema pallidum AB w/Reflex RPR [[id number]] Collected: 11/13/24 2134    Specimen: Blood Updated: 11/13/24 2143              Review of Systems    The following systems were reviewed and negative;  ENT, respiratory, cardiovascular, gastrointestinal, genitourinary, breast, endocrine and allergies / immunologic.      Physical Exam:      General Appearance:    Alert, cooperative, in no acute distress   Head:    Normocephalic, without obvious abnormality, atraumatic   Eyes:            Lids and lashes normal, conjunctivae and sclerae normal, no   icterus, no pallor, corneas clear, PERRLA   Ears:    Ears appear intact with no abnormalities noted   Throat:   No oral lesions, no thrush, oral mucosa moist   Neck:   No adenopathy, supple, trachea midline, no thyromegaly, no     carotid bruit, no JVD   Back:     No kyphosis present, no scoliosis present, no skin lesions,       erythema or scars, no tenderness to percussion or                   palpation,   range of motion normal   Lungs:     Clear to auscultation,respirations regular, even and                   unlabored    Heart:    Regular rhythm and normal rate, normal S1 and S2, no             murmur, no gallop, no rub, no click   Breast Exam:    Deferred   Abdomen:     Normal bowel sounds, no masses, no organomegaly, soft        non-tender, non-distended, no guarding, no rebound                 tenderness   Genitalia:    Cervix: Dilated 2cm, 50%   Extremities:   Moves all extremities well, no edema, no cyanosis, no              redness   Pulses:   Pulses palpable and equal bilaterally   Skin:   No bleeding, bruising or rash   Lymph nodes:   No palpable adenopathy   Neurologic:   Cranial nerves 2 - 12 grossly intact, sensation intact, DTR        present and equal bilaterally         Assessment: Patient is a 38 y.o. female who presents with IUP at 39w0d weeks gestation. G 7, P 4024. Oligohydramnios. BRIAN 3-5.    Chief Complaint   Patient presents with    Scheduled Induction     Pt arrived to L&D for scheduled induction with cervical ripening       Plan of Care: Admit. Proceed with IL.       Messi Armstrong III, MD  11/14/24  07:50 EST

## 2024-11-14 NOTE — ANESTHESIA PROCEDURE NOTES
Labor Epidural    Pre-sedation assessment completed: 11/14/2024 10:32 AM    Patient reassessed immediately prior to procedure    Patient location during procedure: OB  Start Time: 11/14/2024 10:32 AM  Stop Time: 11/14/2024 10:38 AM  Indication:at surgeon's request  Performed By  Anesthesiologist: Hieu Constantino MD  Preanesthetic Checklist  Completed: patient identified, IV checked, site marked, risks and benefits discussed, surgical consent, monitors and equipment checked, pre-op evaluation and timeout performed  Prep:  Pt Position:sitting  Sterile Tech:gloves, mask, sterile barrier and cap  Prep:povidone-iodine 7.5% surgical scrub  Monitoring:blood pressure monitoring  Epidural Block Procedure:  Approach:midline  Guidance:landmark technique and palpation technique  Location:L3-L4  Needle Type:Tuohy  Needle Gauge:17 G  Loss of Resistance Medium: air  Loss of Resistance: 6cm  Cath Depth at skin:9 cm  Paresthesia: none  Aspiration:negative  Test Dose:negative  Number of Attempts: 1  Post Assessment:  Dressing:occlusive dressing applied and secured with tape  Pt Tolerance:patient tolerated the procedure well with no apparent complications  Complications:no

## 2024-11-14 NOTE — NON STRESS TEST
Zhanna Veras, a  at 39w0d with an LETY of 2024, by Ultrasound, was seen at [facility] for a nonstress test.    Chief Complaint   Patient presents with    Scheduled Induction     Pt arrived to L&D for scheduled induction with cervical ripening       Patient Active Problem List   Diagnosis    Mild persistent asthma without complication    Chronic allergic rhinitis    Vitamin D deficiency    Generalized anxiety disorder    Restless leg syndrome    Mixed hyperlipidemia    DDD (degenerative disc disease), lumbar    Migraine without aura and without status migrainosus, not intractable    Iron deficiency anemia    Encounter for immunization    Healthcare maintenance    18 weeks gestation of pregnancy    Pregnancy       Start Time: 704  Stop Time: 724    Interpretation A  Nonstress Test Interpretation A: Reactive  Comments A: verified by virgie ashford rn

## 2024-11-14 NOTE — PLAN OF CARE
Problem: Adult Inpatient Plan of Care  Goal: Plan of Care Review  Outcome: Progressing  Goal: Patient-Specific Goal (Individualized)  Outcome: Progressing  Goal: Absence of Hospital-Acquired Illness or Injury  Outcome: Progressing  Intervention: Identify and Manage Fall Risk  Recent Flowsheet Documentation  Taken 11/14/2024 1500 by Lorena Fitzpatrick, RN  Safety Promotion/Fall Prevention: safety round/check completed  Intervention: Prevent Infection  Recent Flowsheet Documentation  Taken 11/14/2024 1500 by Lorena Fitzpatrick, RN  Infection Prevention: hand hygiene promoted  Goal: Optimal Comfort and Wellbeing  Outcome: Progressing  Intervention: Provide Person-Centered Care  Recent Flowsheet Documentation  Taken 11/14/2024 1500 by Lorena Fitzpatrick, RN  Trust Relationship/Rapport:   care explained   choices provided   emotional support provided   empathic listening provided   questions answered   questions encouraged   reassurance provided   thoughts/feelings acknowledged  Goal: Readiness for Transition of Care  Outcome: Progressing   Goal Outcome Evaluation:

## 2024-11-14 NOTE — ANESTHESIA PREPROCEDURE EVALUATION
Anesthesia Evaluation     Patient summary reviewed and Nursing notes reviewed   no history of anesthetic complications:   NPO Solid Status: > 8 hours  NPO Liquid Status: > 8 hours           Airway   Mallampati: II  TM distance: >3 FB  Neck ROM: full  no difficulty expected  Dental - normal exam     Pulmonary - negative pulmonary ROS and normal exam   Cardiovascular - normal exam    (+) hyperlipidemia      Neuro/Psych- negative ROS  GI/Hepatic/Renal/Endo - negative ROS     Musculoskeletal (-) negative ROS    Abdominal  - normal exam    Bowel sounds: normal.   Substance History - negative use     OB/GYN    (+) Pregnant        Other                          Anesthesia Plan    ASA 2     general     intravenous induction     Anesthetic plan, risks, benefits, and alternatives have been provided, discussed and informed consent has been obtained with: patient.    Plan discussed with CRNA.

## 2024-11-14 NOTE — NON STRESS TEST
Zhanna Veras, lacy  at 38w6d with an LETY of 2024, by Ultrasound, was seen at [facility] for a nonstress test.    Chief Complaint   Patient presents with    Scheduled Induction     Pt arrived to L&D for scheduled induction with cervical ripening       Patient Active Problem List   Diagnosis    Mild persistent asthma without complication    Chronic allergic rhinitis    Vitamin D deficiency    Generalized anxiety disorder    Restless leg syndrome    Mixed hyperlipidemia    DDD (degenerative disc disease), lumbar    Migraine without aura and without status migrainosus, not intractable    Iron deficiency anemia    Encounter for immunization    Healthcare maintenance    18 weeks gestation of pregnancy    Pregnancy       Start Time:   Stop Time:     Interpretation A  Nonstress Test Interpretation A: Reactive  Comments A: Verified by SHUBHAM Jean RN

## 2024-11-15 LAB
BASOPHILS # BLD AUTO: 0.03 10*3/MM3 (ref 0–0.2)
BASOPHILS NFR BLD AUTO: 0.3 % (ref 0–1.5)
DEPRECATED RDW RBC AUTO: 49 FL (ref 37–54)
EOSINOPHIL # BLD AUTO: 0.11 10*3/MM3 (ref 0–0.4)
EOSINOPHIL NFR BLD AUTO: 1.1 % (ref 0.3–6.2)
ERYTHROCYTE [DISTWIDTH] IN BLOOD BY AUTOMATED COUNT: 14.5 % (ref 12.3–15.4)
HCT VFR BLD AUTO: 31.9 % (ref 34–46.6)
HGB BLD-MCNC: 10.4 G/DL (ref 12–15.9)
IMM GRANULOCYTES # BLD AUTO: 0.08 10*3/MM3 (ref 0–0.05)
IMM GRANULOCYTES NFR BLD AUTO: 0.8 % (ref 0–0.5)
LYMPHOCYTES # BLD AUTO: 1.89 10*3/MM3 (ref 0.7–3.1)
LYMPHOCYTES NFR BLD AUTO: 18.2 % (ref 19.6–45.3)
MCH RBC QN AUTO: 30.5 PG (ref 26.6–33)
MCHC RBC AUTO-ENTMCNC: 32.6 G/DL (ref 31.5–35.7)
MCV RBC AUTO: 93.5 FL (ref 79–97)
MONOCYTES # BLD AUTO: 0.64 10*3/MM3 (ref 0.1–0.9)
MONOCYTES NFR BLD AUTO: 6.2 % (ref 5–12)
NEUTROPHILS NFR BLD AUTO: 7.65 10*3/MM3 (ref 1.7–7)
NEUTROPHILS NFR BLD AUTO: 73.4 % (ref 42.7–76)
NRBC BLD AUTO-RTO: 0 /100 WBC (ref 0–0.2)
PLATELET # BLD AUTO: 124 10*3/MM3 (ref 140–450)
PMV BLD AUTO: 11 FL (ref 6–12)
RBC # BLD AUTO: 3.41 10*6/MM3 (ref 3.77–5.28)
WBC NRBC COR # BLD AUTO: 10.4 10*3/MM3 (ref 3.4–10.8)

## 2024-11-15 PROCEDURE — 85025 COMPLETE CBC W/AUTO DIFF WBC: CPT | Performed by: OBSTETRICS & GYNECOLOGY

## 2024-11-15 RX ADMIN — PRENATAL VITAMINS-IRON FUMARATE 27 MG IRON-FOLIC ACID 0.8 MG TABLET 1 TABLET: at 21:57

## 2024-11-15 RX ADMIN — Medication 400 MG: at 21:57

## 2024-11-15 RX ADMIN — CEPHALEXIN 500 MG: 500 CAPSULE ORAL at 08:34

## 2024-11-15 RX ADMIN — DOCUSATE SODIUM 100 MG: 100 CAPSULE, LIQUID FILLED ORAL at 08:34

## 2024-11-15 RX ADMIN — CEPHALEXIN 500 MG: 500 CAPSULE ORAL at 21:57

## 2024-11-15 RX ADMIN — CEPHALEXIN 500 MG: 500 CAPSULE ORAL at 12:11

## 2024-11-15 RX ADMIN — DOCUSATE SODIUM 100 MG: 100 CAPSULE, LIQUID FILLED ORAL at 21:57

## 2024-11-15 RX ADMIN — CEPHALEXIN 500 MG: 500 CAPSULE ORAL at 19:15

## 2024-11-15 RX ADMIN — MONTELUKAST SODIUM 10 MG: 10 TABLET, COATED ORAL at 21:57

## 2024-11-15 RX ADMIN — IBUPROFEN 600 MG: 600 TABLET, FILM COATED ORAL at 08:34

## 2024-11-15 RX ADMIN — CETIRIZINE HYDROCHLORIDE 10 MG: 10 TABLET, FILM COATED ORAL at 21:57

## 2024-11-15 RX ADMIN — ESCITALOPRAM OXALATE 20 MG: 10 TABLET ORAL at 21:57

## 2024-11-15 NOTE — CASE MANAGEMENT/SOCIAL WORK
Case Management/Social Work    Patient Name:  Zhanna Veras  YOB: 1985  MRN: [id number]  Admit Date:  11/13/2024        SS received consult for score 12 on the eppds. SS spoke with Pt at bedside. Pt states she batttles anxiety but is prescribed medications by her PCP. Pt states she has good family support.     Infant ok to be discharged home with Mother.       Electronically signed by:  ARSENIO Kearney  11/15/24 16:17 EST

## 2024-11-15 NOTE — PROGRESS NOTES
"[facility]  Vaginal Delivery Progress Note    Subjective   Subjective  Postpartum Day 1: Vaginal Delivery    The patient feels well.  Her pain is well controlled with nonsteroidal anti-inflammatory drugs.   She is ambulating well.  Patient describes her bleeding as thin lochia.      Objective     Objective:  Vital signs (most recent): Blood pressure 104/64, pulse 67, temperature 98 °F (36.7 °C), temperature source Oral, resp. rate 18, height 162.6 cm (64\"), weight 82.4 kg (181 lb 9.6 oz), SpO2 96%, currently breastfeeding.     Vital Signs Range for the last 24 hours  Temperature: Temp:  [98 °F (36.7 °C)-98.6 °F (37 °C)] 98 °F (36.7 °C)   Temp Source: Temp src: Oral   BP: BP: ()/(58-83) 104/64   Pulse: Heart Rate:  [67-95] 67   Respirations: Resp:  [16-20] 18   Weight:       Admit Height:  Height: 162.6 cm (64\")    Physical Exam:  General:  no acute distresss.  Abdomen: Fundus: appropriate, firm, non tender  Extremities: normal, atraumatic, no cyanosis, and trace edema.     [unfilled]       Lab Results   Component Value Date    ABO O 11/13/2024    RH Positive 11/13/2024        Lab Results   Component Value Date    HGB 10.4 (L) 11/15/2024    HCT 31.9 (L) 11/15/2024         Assessment & Plan   Principal Problem:    Pregnancy      Zhanna Veras is Day 1  post-partum  Vaginal, Spontaneous  .      Plan:  Continue current care.      Tosha Chapman DO  11/15/2024  13:23 EST    "

## 2024-11-15 NOTE — PAYOR COMM NOTE
"Zhanna Veras (38 y.o. Female)       Date of Birth   1985    Social Security Number       Address   [address] [place] [address]    Home Phone   [phone]    MRN   [id number]       Sikh   Shinto    Marital Status                               Admission Date   24    Admission Type   Elective    Admitting Provider   Fanta Bell DO    Attending Provider   Messi Armstrong III, MD    Department, Room/Bed   [facility], W244/1       Discharge Date       Discharge Disposition       Discharge Destination                                 Attending Provider: Messi Armstrong III, MD    Allergies: Aloe Vera, Penicillins    Isolation: None   Infection: None   Code Status: CPR    Ht: 162.6 cm (64\")   Wt: 82.4 kg (181 lb 9.6 oz)    Admission Cmt: None   Principal Problem: Pregnancy [Z34.90]                   Active Insurance as of 2024       Primary Coverage       Payor Plan Insurance Group Employer/Plan Group    ANTHEM BLUE CROSS ANTHEM BLUE CROSS BLUE SHIELD PPO [id number]       Payor Plan Address Payor Plan Phone Number Payor Plan Fax Number Effective Dates    [address] [phone]  3/1/2024 - None Entered    [facility] [id number]         Subscriber Name Subscriber Birth Date Member ID       KATIE VERAS 1985 [id number]                     Emergency Contacts        (Rel.) Home Phone Work Phone Mobile Phone    Katie Veras (Spouse) [phone] -- [phone]          History:  Estimated Date of Delivery: 24 Gestational Age: 39w0d       OB History       8    Para   5    Term   5            AB   3    Living   5      SAB   1    IAB        Ectopic   1    Molar        Multiple   0    Live Births   5         Delivery Summary  # Outcome Date GA Labor/2nd Weight Sex Type Anes PTL Lv A1 A5   8 Term 24 39w0d 4h 46m / 0h 12m 3930 g (8 lb 10.6 oz) M Vag-Spont Epidural N Living 8 9          History " & Physical        Messi Armstrong III, MD at 24 0749                Chief complaint   Chief Complaint   Patient presents with    Scheduled Induction     Pt arrived to L&D for scheduled induction with cervical ripening       History of Present Illness: Patient is a 38 y.o. female who presents with IUP at 39w0d weeks gestation. G 7, P 4024. Oligohydramnios. BRIAN 3-5.        Past Medical History:   Diagnosis Date    Abdominal pain     ADHD (attention deficit hyperactivity disorder) 2017    Allergic 2018    Allergic rhinitis     Anemia 2020    Anxiety 16    Arthritis     osteo    Asthma     Cough     Depression     Ectopic pregnancy     Headache 2019    History of transfusion     HPV (human papilloma virus) infection     Malaise and fatigue     Respiratory abnormality     Urinary tract infection     Visual impairment 95    Have had glasses since 4th grade    Vitamin D deficiency      Blood Type: O   Fetal Gender: Male  GBS: Negative    Past Surgical History:   Procedure Laterality Date    DILATATION AND CURETTAGE      SALPINGECTOMY Right     WISDOM TOOTH EXTRACTION       Family History   Problem Relation Age of Onset    Asthma Mother     COPD Mother     Diabetes Mother     Hypertension Mother     Anxiety disorder Mother     Depression Mother     Hyperlipidemia Mother     Alcohol abuse Father     Cancer Father     Arthritis Paternal Grandmother     Cancer Paternal Grandmother     COPD Paternal Grandmother     Stroke Paternal Grandmother     Asthma Paternal Grandmother     Miscarriages / Stillbirths Paternal Grandmother         Last child was stillborn    Learning disabilities Son         ADHD/ODD    Learning disabilities Son         ADHD     Social History     Tobacco Use    Smoking status: Former     Current packs/day: 0.00     Average packs/day: 0.3 packs/day for 1 year (0.3 ttl pk-yrs)     Types: Cigarettes     Start date: 2005     Quit date: 2006     Years since quittin.4     Passive exposure:  Never    Smokeless tobacco: Never   Vaping Use    Vaping status: Never Used   Substance Use Topics    Alcohol use: No    Drug use: No     Medications Prior to Admission   Medication Sig Dispense Refill Last Dose/Taking    calcium carbonate (TUMS) 500 MG chewable tablet Chew 2 tablets 3 (Three) Times a Day As Needed for Indigestion or Heartburn.   11/13/2024 Noon    cephalexin (KEFLEX) 500 MG capsule Take 1 capsule by mouth 4 (Four) Times a Day.   11/13/2024 Evening    cetirizine (zyrTEC) 10 MG tablet Take 1 tablet by mouth Every Night.   Past Week Bedtime    escitalopram (LEXAPRO) 20 MG tablet Take 1 tablet by mouth Every Night.   Past Week Bedtime    magnesium oxide (MAG-OX) 400 MG tablet Take 1 tablet by mouth Every Night.   Past Week Bedtime    montelukast (SINGULAIR) 10 MG tablet Take 1 tablet by mouth Every Night.   Past Week Bedtime    Prenatal Vit-Fe Fumarate-FA (prenatal vitamin 27-0.8) 27-0.8 MG tablet tablet Take 1 tablet by mouth Every Night.   Past Week Bedtime     Allergies:  Aloe vera and Penicillins      Vital Signs  Temp:  [97 °F (36.1 °C)-98.1 °F (36.7 °C)] 97 °F (36.1 °C)  Heart Rate:  [72] 72  Resp:  [20] 20  BP: (115)/(63) 115/63    Radiology  Imaging Results (Last 24 Hours)       ** No results found for the last 24 hours. **            Labs  Lab Results (last 24 hours)       Procedure Component Value Units Date/Time    Urine Drug Screen - Urine, Clean Catch [[id number]]  (Normal) Collected: 11/13/24 2130    Specimen: Urine, Clean Catch Updated: 11/13/24 2157     THC, Screen, Urine Negative     Phencyclidine (PCP), Urine Negative     Cocaine Screen, Urine Negative     Methamphetamine, Ur Negative     Opiate Screen Negative     Amphetamine Screen, Urine Negative     Benzodiazepine Screen, Urine Negative     Tricyclic Antidepressants Screen Negative     Methadone Screen, Urine Negative     Barbiturates Screen, Urine Negative     Oxycodone Screen, Urine Negative     Buprenorphine, Screen, Urine  Negative    Narrative:      Cutoff For Drugs Screened:    Amphetamines               500 ng/ml  Barbiturates               200 ng/ml  Benzodiazepines            150 ng/ml  Cocaine                    150 ng/ml  Methadone                  200 ng/ml  Opiates                    100 ng/ml  Phencyclidine               25 ng/ml  THC                         50 ng/ml  Methamphetamine            500 ng/ml  Tricyclic Antidepressants  300 ng/ml  Oxycodone                  100 ng/ml  Buprenorphine               10 ng/ml    The normal value for all drugs tested is negative. This report includes unconfirmed screening results, with the cutoff values listed, to be used for medical treatment purposes only.  Unconfirmed results must not be used for non-medical purposes such as employment or legal testing.  Clinical consideration should be applied to any drug of abuse test, particularly when unconfirmed results are used.      Fentanyl, Urine - Urine, Clean Catch [[id number]]  (Normal) Collected: 11/13/24 2130    Specimen: Urine, Clean Catch Updated: 11/13/24 2157     Fentanyl, Urine Negative    Narrative:      Negative Threshold:      Fentanyl 5 ng/mL     The normal value for the drug tested is negative. This report includes final unconfirmed screening results to be used for medical treatment purposes only. Unconfirmed results must not be used for non-medical purposes such as employment or legal testing. Clinical consideration should be applied to any drug of abuse test, particularly when unconfirmed results are used.           CBC & Differential [[id number]]  (Abnormal) Collected: 11/13/24 2134    Specimen: Blood Updated: 11/13/24 2146    Narrative:      The following orders were created for panel order CBC & Differential.  Procedure                               Abnormality         Status                     ---------                               -----------         ------                     CBC Auto Differential[[id number]]         Abnormal            Final result                 Please view results for these tests on the individual orders.    CBC Auto Differential [[id number]]  (Abnormal) Collected: 11/13/24 2134    Specimen: Blood Updated: 11/13/24 2146     WBC 6.63 10*3/mm3      RBC 3.60 10*6/mm3      Hemoglobin 10.9 g/dL      Hematocrit 33.0 %      MCV 91.7 fL      MCH 30.3 pg      MCHC 33.0 g/dL      RDW 14.3 %      RDW-SD 47.5 fl      MPV 10.6 fL      Platelets 158 10*3/mm3      Neutrophil % 68.9 %      Lymphocyte % 22.0 %      Monocyte % 7.4 %      Eosinophil % 0.9 %      Basophil % 0.3 %      Immature Grans % 0.5 %      Neutrophils, Absolute 4.57 10*3/mm3      Lymphocytes, Absolute 1.46 10*3/mm3      Monocytes, Absolute 0.49 10*3/mm3      Eosinophils, Absolute 0.06 10*3/mm3      Basophils, Absolute 0.02 10*3/mm3      Immature Grans, Absolute 0.03 10*3/mm3      nRBC 0.0 /100 WBC     Treponema pallidum AB w/Reflex RPR [[id number]] Collected: 11/13/24 2134    Specimen: Blood Updated: 11/13/24 2143              Review of Systems    The following systems were reviewed and negative;  ENT, respiratory, cardiovascular, gastrointestinal, genitourinary, breast, endocrine and allergies / immunologic.      Physical Exam:      General Appearance:    Alert, cooperative, in no acute distress   Head:    Normocephalic, without obvious abnormality, atraumatic   Eyes:            Lids and lashes normal, conjunctivae and sclerae normal, no   icterus, no pallor, corneas clear, PERRLA   Ears:    Ears appear intact with no abnormalities noted   Throat:   No oral lesions, no thrush, oral mucosa moist   Neck:   No adenopathy, supple, trachea midline, no thyromegaly, no     carotid bruit, no JVD   Back:     No kyphosis present, no scoliosis present, no skin lesions,       erythema or scars, no tenderness to percussion or                   palpation,   range of motion normal   Lungs:     Clear to auscultation,respirations regular, even and                    unlabored    Heart:    Regular rhythm and normal rate, normal S1 and S2, no            murmur, no gallop, no rub, no click   Breast Exam:    Deferred   Abdomen:     Normal bowel sounds, no masses, no organomegaly, soft        non-tender, non-distended, no guarding, no rebound                 tenderness   Genitalia:    Cervix: Dilated 2cm, 50%   Extremities:   Moves all extremities well, no edema, no cyanosis, no              redness   Pulses:   Pulses palpable and equal bilaterally   Skin:   No bleeding, bruising or rash   Lymph nodes:   No palpable adenopathy   Neurologic:   Cranial nerves 2 - 12 grossly intact, sensation intact, DTR        present and equal bilaterally         Assessment: Patient is a 38 y.o. female who presents with IUP at 39w0d weeks gestation. G 7, P 4024. Oligohydramnios. BRIAN 3-5.    Chief Complaint   Patient presents with    Scheduled Induction     Pt arrived to L&D for scheduled induction with cervical ripening       Plan of Care: Admit. Proceed with IL.       Messi Armstrong III, MD  11/14/24  07:50 EST      Electronically signed by Messi Armstrong III, MD at 11/14/24 0751       H&P signed by [employer], [employer] at 11/14/24 0809         [Media Unavailable] Scan on 11/14/2024 0802 by [employer], [employer]: PRENATAL H&P, [facility], 11/13/2024          Electronically signed by [employer] On[employer], [employer] at 11/14/24 0809       Orders (all)        Start     Ordered    11/15/24 0833  lanolin topical 1 Application  As Needed         11/15/24 0834    11/15/24 0800  Sitz Bath  3 Times Daily        Comments: PRN    11/14/24 1540    11/15/24 0600  CBC & Differential  Timed        Comments: Postpartum Day 1      11/14/24 1540    11/15/24 0600  CBC Auto Differential  PROCEDURE ONCE        Comments: Postpartum Day 1      11/14/24 2202    11/15/24 0000  bisacodyl (DULCOLAX) suppository 10 mg  Daily PRN         11/14/24 1540    11/14/24 2100  docusate sodium (COLACE) capsule 100 mg  2 Times Daily          11/14/24 1540    11/14/24 1539  diphenhydrAMINE (BENADRYL) capsule 25 mg  Nightly PRN         11/14/24 1540    11/14/24 1537  VTE Prophylaxis Not Indicated: Low Risk; No Risk Factors (0)  Once         11/14/24 1540    11/14/24 1536  Code Status and Medical Interventions: CPR (Attempt to Resuscitate); Full  Continuous         11/14/24 1540    11/14/24 1536  Vital Signs Per hospital policy  Per Hospital Policy         11/14/24 1540    11/14/24 1536  Notify Physician  Until Discontinued         11/14/24 1540    11/14/24 1536  Up Ad Yin  Until Discontinued         11/14/24 1540    11/14/24 1536  Diet: Regular/House; Fluid Consistency: Thin (IDDSI 0)  Diet Effective Now         11/14/24 1540    11/14/24 1536  Fundal and Lochia Check  Per Hospital Policy        Comments: Q 15 min x 4, Q 30 min x 2, then Q Shift    11/14/24 1540    11/14/24 1536  RN to Assess Rh Status & Place RhIG Evaluation Order if Indicated  Continuous         11/14/24 1540    11/14/24 1536  Bladder Assessment  Per Order Details        Comments: Postpartum 1) Upon Admission to Unit & Every 4 Hours PRN Until Voiding. 2) Out of Bed to Void in 8 Hours.    11/14/24 1540    11/14/24 1536  Straight Cath  Per Order Details        Comments: Postpartum: If Distended & Unable to Void, May Repeat Once.    11/14/24 1540    11/14/24 1536  Indwelling Urinary Catheter  Per Order Details        Comments: Postpartum : After Straight Cathed x2 or if Greater Than 1000mL Residual, Insert Indwelling Urinary Catheter Until Further MD Order.    11/14/24 1540    11/14/24 1536  Breast pump to bed  Once         11/14/24 1540    11/14/24 1536  If indicated -- Please administer RH Immunoglobulin based on results of cord blood evaluation and fetal screen lab tests, pharmacy to dispense  Continuous        Comments: See process instructions for reference range details.    11/14/24 1540    11/14/24 1535  oxytocin (PITOCIN) 30 units in 0.9% sodium chloride 500 mL (premix)  Once As  "Needed         11/14/24 1540    11/14/24 1535  ibuprofen (ADVIL,MOTRIN) tablet 600 mg  Every 6 Hours PRN         11/14/24 1540    11/14/24 1535  acetaminophen (TYLENOL) tablet 650 mg  Every 6 Hours PRN         11/14/24 1540    11/14/24 1535  HYDROcodone-acetaminophen (NORCO) 5-325 MG per tablet 1 tablet  Every 4 Hours PRN        Placed in \"Or\" Linked Group    11/14/24 1540    11/14/24 1535  HYDROcodone-acetaminophen (NORCO)  MG per tablet 1 tablet  Every 4 Hours PRN        Placed in \"Or\" Linked Group    11/14/24 1540    11/14/24 1535  ketorolac (TORADOL) injection 30 mg  Every 6 Hours PRN         11/14/24 1540    11/14/24 1535  carboprost (HEMABATE) injection 250 mcg  Once As Needed         11/14/24 1540    11/14/24 1535  miSOPROStol (CYTOTEC) tablet 600 mcg  Once As Needed         11/14/24 1540    11/14/24 1535  methylergonovine (METHERGINE) injection 200 mcg  Once As Needed         11/14/24 1540    11/14/24 1535  magnesium hydroxide (MILK OF MAGNESIA) suspension 10 mL  Daily PRN         11/14/24 1540    11/14/24 1535  witch hazel-glycerin (TUCKS) pad 1 Pad  As Needed         11/14/24 1540    11/14/24 1535  Hydrocortisone (Perianal) (ANUSOL-HC) 2.5 % rectal cream 1 Application  As Needed         11/14/24 1540    11/14/24 1535  benzocaine (AMERICAINE) 20 % rectal ointment 1 Application  As Needed         11/14/24 1540    11/14/24 1535  ondansetron ODT (ZOFRAN-ODT) disintegrating tablet 4 mg  Every 6 Hours PRN        Placed in \"Or\" Linked Group    11/14/24 1540    11/14/24 1535  ondansetron (ZOFRAN) injection 4 mg  Every 6 Hours PRN        Placed in \"Or\" Linked Group    11/14/24 1540    11/14/24 1535  sodium chloride 0.9 % flush 1-10 mL  As Needed         11/14/24 1540    11/14/24 1400  ibuprofen (ADVIL,MOTRIN) tablet 800 mg  Every 8 Hours Scheduled,   Status:  Discontinued         11/14/24 1222    11/14/24 1330  oxytocin (PITOCIN) 30 units in 0.9% sodium chloride 500 mL (premix)  Continuous        Placed in " "\"Followed by\" Linked Group    11/14/24 1222    11/14/24 1315  oxytocin (PITOCIN) 30 units in 0.9% sodium chloride 500 mL (premix)  Once,   Status:  Discontinued        Placed in \"Followed by\" Linked Group    11/14/24 1222    11/14/24 1315  sodium chloride 0.9 % flush 10 mL  Every 12 Hours Scheduled         11/14/24 1222    11/14/24 1315  lactated ringers infusion  Once         11/14/24 1222    11/14/24 1223  Notify Provider (Specified)  Until Discontinued         11/14/24 1222    11/14/24 1223  Vital Signs Per Hospital Policy  Per Hospital Policy         11/14/24 1222    11/14/24 1223  Up as Tolerated  Until Discontinued         11/14/24 1222 11/14/24 1223  Diet: Regular/House; Fluid Consistency: Thin (IDDSI 0)  Diet Effective Now,   Status:  Canceled         11/14/24 1222    11/14/24 1223  Oxygen Therapy- Nasal Cannula; Titrate 1-6 LPM Per SpO2; 90 - 95%  Continuous         11/14/24 1222    11/14/24 1223  Continuous Pulse Oximetry  Continuous         11/14/24 1222    11/14/24 1223  POC Glucose Once  Once        Comments: For all diabetic patients and all patients who are to be admitted. Notify Anesthesiologist for blood sugar > 180.      11/14/24 1222    11/14/24 1223  POC Urine Pregnancy  STAT         11/14/24 1222    11/14/24 1223  Insert Peripheral IV  Once         11/14/24 1222    11/14/24 1223  Saline Lock & Maintain IV Access  Continuous         11/14/24 1222    11/14/24 1223  May take Beta Blocker from home with sip of water.  Once         11/14/24 1222    11/14/24 1223  Nurse may remove epidural catheter after delivery.  Until Discontinued         11/14/24 1222    11/14/24 1223  Transfer to postpartum when discharge criteria met.  Until Discontinued         11/14/24 1222    11/14/24 1223  DIET MESSAGE PLEASE SEND CHICKEN TENDERS, FRIES, DESSERT, AND A SPRITE THANK YOU  Once        Comments: PLEASE SEND CHICKEN TENDERS, FRIES, DESSERT, AND A SPRITE  THANK YOU    11/14/24 1222    11/14/24 1222  sodium " chloride 0.9 % flush 10 mL  As Needed         11/14/24 1222    11/14/24 1222  sodium chloride 0.9 % infusion 40 mL  As Needed         11/14/24 1222    11/14/24 1222  midazolam (VERSED) injection 1 mg  Every 10 Minutes PRN         11/14/24 1222    11/14/24 1222  Fundal & Lochia Check  Every Shift       11/14/24 1222    11/14/24 1222  acetaminophen (TYLENOL) tablet 650 mg  Every 4 Hours PRN,   Status:  Discontinued         11/14/24 1222    11/14/24 1222  HYDROcodone-acetaminophen (NORCO) 5-325 MG per tablet 1 tablet  Every 4 Hours PRN,   Status:  Discontinued         11/14/24 1222    11/14/24 1222  methylergonovine (METHERGINE) injection 200 mcg  Once As Needed,   Status:  Discontinued         11/14/24 1222    11/14/24 1222  carboprost (HEMABATE) injection 250 mcg  As Needed,   Status:  Discontinued         11/14/24 1222    11/14/24 1222  miSOPROStol (CYTOTEC) tablet 600 mcg  As Needed,   Status:  Discontinued         11/14/24 1222    11/14/24 1115  lactated ringers bolus 1,000 mL  Once,   Status:  Discontinued         11/14/24 1024    11/14/24 1115  ropivacaine (NAROPIN) 0.2 % injection  Continuous         11/14/24 1024    11/14/24 1025  Vital Signs Per Anesthesia Guidelines  Per Order Details        Comments: Every 2 Minutes x5, Every 5 Minutes x4, Then If Stable Every 15 Minutes    11/14/24 1024    11/14/24 1025  Start IV with #16 or #18 gauge angiocath.  Once         11/14/24 1024    11/14/24 1025  Fetal Heart Rate Monitor  Once         11/14/24 1024    11/14/24 1025  Nurse or anesthesiologist to remain with patient for 15 minutes following dosing.  Until Discontinued         11/14/24 1024    11/14/24 1025  Facilitate maternal postion on side and maintain uterine displacement.  Until Discontinued         11/14/24 1024    11/14/24 1025  Consult anesthesia services prior to changing epidural infusion/rate.  Until Discontinued         11/14/24 1024    11/14/24 1025  Notify physician for the following conditions:   Until Discontinued         11/14/24 1024    11/14/24 1024  ePHEDrine Sulfate (Pressors) 5 MG/ML injection 10 mg  Every 10 Minutes PRN,   Status:  Discontinued         11/14/24 1024    11/14/24 1024  ondansetron (ZOFRAN) injection 4 mg  Once As Needed,   Status:  Discontinued         11/14/24 1024    11/14/24 1024  famotidine (PEPCID) injection 20 mg  Once As Needed,   Status:  Discontinued         11/14/24 1024    11/14/24 0800  oxytocin (PITOCIN) 30 units in 0.9% sodium chloride 500 mL (premix)  Titrated         11/14/24 0712    11/14/24 0730  lactated ringers infusion  Continuous         11/13/24 2129 11/14/24 0215  cephalexin (KEFLEX) capsule 500 mg  4 Times Daily         11/14/24 0118    11/14/24 0215  cetirizine (zyrTEC) tablet 10 mg  Nightly         11/14/24 0118    11/14/24 0215  escitalopram (LEXAPRO) tablet 20 mg  Nightly         11/14/24 0118    11/14/24 0215  magnesium oxide (MAG-OX) tablet 400 mg  Nightly         11/14/24 0118    11/14/24 0215  montelukast (SINGULAIR) tablet 10 mg  Nightly         11/14/24 0118    11/14/24 0215  prenatal vitamin 27-0.8 tablet 1 tablet  Nightly         11/14/24 0118    11/14/24 0117  calcium carbonate (TUMS) chewable tablet 500 mg (200 mg elemental)  3 Times Daily PRN         11/14/24 0118 11/14/24 0000  Vital Signs q 4 while awake  Every 4 Hours      Comments: While the patient is awake.    11/13/24 2129 11/13/24 2200  miSOPROStol (CYTOTEC) tablet 25 mcg  Every 4 Hours PRN         11/13/24 2129 11/13/24 2144  Fentanyl, Urine - Urine, Clean Catch  Once         11/13/24 2144 11/13/24 2124  hydrOXYzine (ATARAX) tablet 50 mg  Nightly PRN,   Status:  Discontinued         11/13/24 2129 11/13/24 2121  Admit To Obstetrics Inpatient  Once         11/13/24 2129 11/13/24 2121  Code Status and Medical Interventions: CPR (Attempt to Resuscitate); Full  Continuous,   Status:  Canceled         11/13/24 2129 11/13/24 2121  Obtain Informed Consent  Once          24  VTE Prophylaxis Not Indicated: Low Risk; Obesity - BMI >30 (1)  Once         24  Vital Signs Per Hospital Policy  Per Hospital Policy         24  Confirm Presence of Amniotic Fluid if Patient Presents With SROM  Once         24  Mini-Prep Prior to Delivery  Once         24  Continuous Fetal Monitoring With NST on Admission and Prior to Initiation of Oxytocin.  Per Order Details        Comments: Continuous Fetal Monitoring With NST on Admission & Prior to Initiation of Oxytocin.    24  External Uterine Contraction Monitoring  Per Hospital Policy         24  Notify Provider (Specified)  Until Discontinued         24  Notify Provider of Tachysystole (Per Hospital Algorithm)  Until Discontinued         24  Notify Provider if Membranes Ruptured, Bleeding Greater Than 1 Pad Per Hour, Fetal Heart Tone Abnormality or Severe Pain  Until Discontinued         24  May Ambulate if Membranes Intact or Head Engaged With Ruptured BOW or Normal Tracing for 20 Minutes  Until Discontinued         24  Initiate Group Beta Strep (GBS) Prophylaxis Protocol, If Criteria Met  Continuous        Comments: NO TREATMENT RECOMMENDED IF: 1) Maternal GBS Status Known Negative 2) Scheduled  Birth With Intact Membranes, Not in Labor 3) Maternal GBS Status Unknown, No Risk Factors  TREAT WITH ANTIBIOTICS IF:  1) Maternal GBS Status Known Positive 2) Maternal GBS Status Unknown With Risk Factors: a)  Previous Infant Affected By GBS Infection b) GBS Urinary Tract Infection (UTI) or Bacteriuria During Pregnancy c) Unexplained Maternal Fever (100.4F (38C) or Greater) During Labor d)  Prolonged Rupture of Membranes (18 or More Hours) e)  "Gestational Age Less Than 37 Weeks    11/13/24 2129 11/13/24 2121  Assess Need for Indwelling Urinary Catheter - Follow Removal Protocol  Continuous        Comments: Indwelling Urinary Catheter Removal Criteria  Discontinue Indwelling Urinary Catheter Unless One of the Following is Present:  Urinary Retention or Obstruction  Chronic Urinary Catheter Use  End of Life  Critical Illness with Strict I/O   Tract or Abdominal Surgery  Stage 3/4 Sacral / Perineal Wound  Required Activity Restriction: Trauma  Required Activity Restriction: Spine Surgery  If Patient is Being Followed by Urology Contact Them PRIOR to Removal  Do Not Remove Indwelling Urinary Catheter Order is Present with a CLINICAL REASON to Maintain the Catheter. Provider is Required to Include a Clinical Reason to Maintain a Urinary Catheter    Patient Admitted With Indwelling Urinary Catheter (Not Placed at [facility])  Assess for Continued Need & Document Medical Necessity  If Infection is Suspected, Contact the Provider       Placed in \"And\" Linked Group    11/13/24 2129 11/13/24 2121  Urinary Catheter Care  Every Shift,   Status:  Canceled      Placed in \"And\" Linked Group    11/13/24 2129 11/13/24 2121  NPO Diet NPO Type: Ice Chips  Diet Effective Now,   Status:  Canceled         11/13/24 2129 11/13/24 2121  Treponema pallidum AB w/Reflex RPR  Once         11/13/24 2129 11/13/24 2121  CBC & Differential  STAT         11/13/24 2129 11/13/24 2121  Urine Drug Screen - Urine, Clean Catch  STAT         11/13/24 2129 11/13/24 2121  Type & Screen  STAT         11/13/24 2129 11/13/24 2121  Insert Peripheral IV  Once         11/13/24 2129 11/13/24 2121  Saline Lock & Maintain IV Access  Continuous,   Status:  Canceled         11/13/24 2129 11/13/24 2121  CBC Auto Differential  PROCEDURE ONCE         11/13/24 2129 11/13/24 2120  sodium chloride (NS) irrigation solution 1,000 mL  Once As Needed,   Status:  Discontinued    " "     11/13/24 2129 11/13/24 2120  sodium chloride 0.9 % flush 10 mL  As Needed,   Status:  Discontinued         11/13/24 2129 11/13/24 2120  sodium chloride 0.9 % infusion 40 mL  As Needed,   Status:  Discontinued         11/13/24 2129 11/13/24 2120  lactated ringers bolus 1,000 mL  Once As Needed         11/13/24 2129 11/13/24 2120  acetaminophen (TYLENOL) tablet 650 mg  Every 4 Hours PRN,   Status:  Discontinued         11/13/24 2129 11/13/24 2120  butorphanol (STADOL) injection 1 mg  Every 2 Hours PRN,   Status:  Discontinued         11/13/24 2129 11/13/24 2120  butorphanol (STADOL) injection 2 mg  Every 3 Hours PRN,   Status:  Discontinued         11/13/24 2129 11/13/24 2120  ondansetron ODT (ZOFRAN-ODT) disintegrating tablet 4 mg  Every 6 Hours PRN,   Status:  Discontinued        Placed in \"Or\" Linked Group    11/13/24 2129 11/13/24 2120  ondansetron (ZOFRAN) injection 4 mg  Every 6 Hours PRN,   Status:  Discontinued        Placed in \"Or\" Linked Group    11/13/24 2129 11/13/24 2120  terbutaline (BRETHINE) injection 0.2 mg  As Needed,   Status:  Discontinued         11/13/24 2129 11/12/24 0000  cephalexin (KEFLEX) 500 MG capsule  4 Times Daily         11/14/24 0020    Unscheduled  Position Change - For Intra-Uterine Resusitation for Hypertonus, HyperStimulation or Non-Reassuring Fetal Status  As Needed       11/13/24 2129    Unscheduled  Insert Indwelling Urinary Catheter  As Needed      Comments: After epidural PRN.  Perform Nasal Decolonization all patients with nava cath   Placed in \"And\" Linked Group    11/13/24 2129    Unscheduled  Fundal & Lochia Check  As Needed      Comments: Every 15 Minutes x4, Then Every 30 Minutes x2, Then Every Shift    11/14/24 1222    Unscheduled  Vital Signs - Per Anesthesia Protocol  As Needed       11/14/24 1222    Unscheduled  Apply Ice to Perineum  As Needed      Comments: For 20 min q 2 hrs    11/14/24 1540    Unscheduled  Kpad  As Needed    "   Comments: For pain    11/14/24 1540    Unscheduled  Warm compress  As Needed       11/14/24 1540    Unscheduled  Apply ace wrap, tight bra, or binder  As Needed       11/14/24 1540    Unscheduled  Apply ice packs  As Needed       11/14/24 1540    --  cetirizine (zyrTEC) 10 MG tablet  Nightly         11/14/24 0020    --  Prenatal Vit-Fe Fumarate-FA (prenatal vitamin 27-0.8) 27-0.8 MG tablet tablet  Nightly         11/14/24 0020    --  magnesium oxide (MAG-OX) 400 MG tablet  Nightly         11/14/24 0020    --  calcium carbonate (TUMS) 500 MG chewable tablet  3 Times Daily PRN         11/14/24 0020    --  escitalopram (LEXAPRO) 20 MG tablet  Nightly         11/14/24 0020    --  montelukast (SINGULAIR) 10 MG tablet  Nightly         11/14/24 0020                     Operative/Procedure Notes (all)        Messi Armstrong III, MD at 11/14/24 1251  Version 1 of 1         Vaginal Delivery Procedure Note    Zhanna YAMINI PaulinoBoykins  39w 0d  G 7,       OBGYN: Messi Armstrong MD      Pre-op Diagnosis: Complete Dilation      Anesthesia: Epidual        Detailed Description of Procedure     The patient was prepped and draped in normal sterile fashion. The head was delivered over an intact perineum. The nares and mouth were bulb suctioned. There was no nuchal cord. Anterior and posterior shoulders delivered without any problems. The rest of the infant was delivered in controlled fashion. The placenta delivered intact. The patient tolerated the procedure well and went to the recovery room in stable condition.        Maternal Blood Type: O   Fetal Gender: Male  Nuchal Cord: No  Tears: No    Amniotic Fluid Clear  Blood Cord: Yes  Estimated Blood Loss: 300cc  Placenta: Spontaneous, Delivered Intact   Uterus Explored: Yes  Membranes: AROM  APGARS: 8 & 9    Disposition: Transfer to Women's Health Floor  Condition: Stable    Messi Armstrong M.D     Date: 11/14/2024  Time: 12:51 EST    Electronically signed by Messi Armstrong III, MD at  11/14/24 9563

## 2024-11-16 VITALS
RESPIRATION RATE: 17 BRPM | TEMPERATURE: 97.9 F | SYSTOLIC BLOOD PRESSURE: 119 MMHG | DIASTOLIC BLOOD PRESSURE: 75 MMHG | WEIGHT: 181.6 LBS | HEART RATE: 58 BPM | OXYGEN SATURATION: 96 % | BODY MASS INDEX: 31 KG/M2 | HEIGHT: 64 IN

## 2024-11-16 PROBLEM — Z34.90 PREGNANCY: Status: RESOLVED | Noted: 2024-11-13 | Resolved: 2024-11-16

## 2024-11-16 RX ORDER — IBUPROFEN 600 MG/1
600 TABLET, FILM COATED ORAL EVERY 6 HOURS PRN
Qty: 40 TABLET | Refills: 0 | Status: SHIPPED | OUTPATIENT
Start: 2024-11-16

## 2024-11-16 RX ORDER — DOCUSATE SODIUM 100 MG/1
100 CAPSULE, LIQUID FILLED ORAL 2 TIMES DAILY
Qty: 60 CAPSULE | Refills: 1 | Status: SHIPPED | OUTPATIENT
Start: 2024-11-16

## 2024-11-16 RX ADMIN — CEPHALEXIN 500 MG: 500 CAPSULE ORAL at 10:26

## 2024-11-16 RX ADMIN — IBUPROFEN 600 MG: 600 TABLET, FILM COATED ORAL at 05:38

## 2024-11-16 NOTE — DISCHARGE INSTR - APPOINTMENTS
Follow up  appointment is Friday December 6th at 9:30 with JOHNATHON Barrios at [facility]

## 2024-11-16 NOTE — PLAN OF CARE
Problem: Adult Inpatient Plan of Care  Goal: Plan of Care Review  11/15/2024 1902 by Lorena Fitzpatrick, RN  Outcome: Progressing  11/15/2024 1902 by Lorena Fitzpatrick, RN  Outcome: Progressing  Goal: Patient-Specific Goal (Individualized)  Outcome: Met  Goal: Absence of Hospital-Acquired Illness or Injury  Outcome: Met  Intervention: Identify and Manage Fall Risk  Recent Flowsheet Documentation  Taken 11/15/2024 0834 by Lorena Fitzpatrick, RN  Safety Promotion/Fall Prevention: safety round/check completed  Goal: Optimal Comfort and Wellbeing  Outcome: Met  Intervention: Monitor Pain and Promote Comfort  Recent Flowsheet Documentation  Taken 11/15/2024 0834 by Lorena Fitzpatrick, RN  Pain Management Interventions: pain medication given  Goal: Readiness for Transition of Care  Outcome: Met   Goal Outcome Evaluation:

## 2024-11-16 NOTE — PROGRESS NOTES
[facility]  [facility]    Primary OB Clinician:     EDC: Estimated Date of Delivery: 24    Gestational Age:39w0d    Antepartum complications: none    Date of Delivery: 2024  Time of Delivery: 12:44 PM    Delivered By:  Messi Armstrong Iii    Delivery Type: Vaginal, Spontaneous     Tubal Ligation: n/a    Baby:male infant;   Apgar:  8  @ 1 minute /   Apgar:  9  @ 5 minutes   Weight: 3930 g (8 lb 10.6 oz)     Anesthesia: Epidural     Intrapartum complications: None    [unfilled]       Lab Results   Component Value Date    ABO O 2024    RH Positive 2024        Lab Results   Component Value Date    HGB 10.4 (L) 11/15/2024    HCT 31.9 (L) 11/15/2024       Subjective   Subjective  Postpartum Day 2: SVE Delivery    The patient feels well.  Her pain is well controlled with nonsteroidal anti-inflammatory drugs.   She is ambulating well.  Patient describes her bleeding as thin lochia.        Objective     Objective:  Vital signs (most recent): Blood pressure 119/75, pulse 58, temperature 97.9 °F (36.6 °C), temperature source Oral, resp. rate 17, height 162.6 cm (64\"), weight 82.4 kg (181 lb 9.6 oz), SpO2 96%, currently breastfeeding.     Vital Signs Range for the last 24 hours  Temperature: Temp:  [97.8 °F (36.6 °C)-97.9 °F (36.6 °C)] 97.9 °F (36.6 °C)   Temp Source: Temp src: Oral   BP: BP: (109-119)/(66-75) 119/75   Pulse: Heart Rate:  [58-60] 58   Respirations: Resp:  [17-18] 17   Weight:       Admit Height:  Height: 162.6 cm (64\")    Physical Exam:  General:  no acute distresss.  Abdomen: Fundus: appropriate, firm, non tender  Extremities: normal, atraumatic, no cyanosis, and trace edema.         Assesment and Plan:    PPD 2 s/p   Follow-up appointment with [facility] in 3 weeks.    Discharge Date: 2024; Discharge Time: 12:52 EST        Tosha Chapman DO  2024  12:49 EST    "

## 2024-11-16 NOTE — PLAN OF CARE
Goal Outcome Evaluation:  Plan of Care Reviewed With: patient           Outcome Evaluation: Pt rested in bed throughout the night. no complaints or concerns. continue with plan of care.

## 2024-11-16 NOTE — PLAN OF CARE
Problem: Adult Inpatient Plan of Care  Goal: Plan of Care Review  Outcome: Met  Flowsheets  Taken 11/16/2024 1306 by Lorena Fitzpatrick RN  Progress: improving  Taken 11/16/2024 0434 by Olga Maya RN  Outcome Evaluation: Pt rested in bed throughout the night. no complaints or concerns. continue with plan of care.  Plan of Care Reviewed With: patient  Goal: Absence of Hospital-Acquired Illness or Injury  Intervention: Identify and Manage Fall Risk  Recent Flowsheet Documentation  Taken 11/16/2024 1200 by Lorena Fitzpatrick, RN  Safety Promotion/Fall Prevention: safety round/check completed  Intervention: Prevent Infection  Recent Flowsheet Documentation  Taken 11/16/2024 1200 by Lorena Fitzpatrick, RN  Infection Prevention:   visitors restricted/screened   single patient room provided   rest/sleep promoted   personal protective equipment utilized   hand hygiene promoted  Goal: Optimal Comfort and Wellbeing  Intervention: Provide Person-Centered Care  Recent Flowsheet Documentation  Taken 11/16/2024 1025 by Lorena Fitzpatrick, RN  Trust Relationship/Rapport:   care explained   choices provided   emotional support provided   empathic listening provided   questions answered   questions encouraged   reassurance provided   thoughts/feelings acknowledged   Goal Outcome Evaluation:           Progress: improving

## 2024-11-19 NOTE — DISCHARGE SUMMARY
[facility]  [facility]    Primary OB Clinician:     EDC: Estimated Date of Delivery: 24    Gestational Age:39w0d    Antepartum complications: none    Date of Delivery: 2024  Time of Delivery: 12:44 PM    Delivered By:  Messi Armstrong Iii    Delivery Type: Vaginal, Spontaneous     Tubal Ligation: n/a    Baby:male infant;   Apgar:  8  @ 1 minute /   Apgar:  9  @ 5 minutes   Weight: 3930 g (8 lb 10.6 oz)     Anesthesia: Epidural     Intrapartum complications: None    [unfilled]       Lab Results   Component Value Date    ABO O 2024    RH Positive 2024        Lab Results   Component Value Date    HGB 10.4 (L) 11/15/2024    HCT 31.9 (L) 11/15/2024       Subjective   Subjective  Postpartum Day 2: SVE Delivery    The patient feels well.  Her pain is well controlled with nonsteroidal anti-inflammatory drugs.   She is ambulating well.  Patient describes her bleeding as thin lochia.        Objective     Objective:  Vital signs (most recent): Blood pressure 119/75, pulse 58, temperature 97.9 °F (36.6 °C), temperature source Oral, resp. rate 17, height 162.6 cm (64\"), weight 82.4 kg (181 lb 9.6 oz), SpO2 96%, currently breastfeeding.     Vital Signs Range for the last 24 hours  Temperature: Temp:  [97.8 °F (36.6 °C)-97.9 °F (36.6 °C)] 97.9 °F (36.6 °C)   Temp Source: Temp src: Oral   BP: BP: (109-119)/(66-75) 119/75   Pulse: Heart Rate:  [58-60] 58   Respirations: Resp:  [17-18] 17   Weight:       Admit Height:  Height: 162.6 cm (64\")    Physical Exam:  General:  no acute distresss.  Abdomen: Fundus: appropriate, firm, non tender  Extremities: normal, atraumatic, no cyanosis, and trace edema.         Assesment and Plan:    PPD 2 s/p   Follow-up appointment with [facility] in 3 weeks.    Discharge Date: 2024; Discharge Time: 12:52 EST        Tosha Chapman DO  2024  12:49 EST    "

## 2024-11-27 ENCOUNTER — MATERNAL SCREENING (OUTPATIENT)
Dept: CALL CENTER | Facility: HOSPITAL | Age: 39
End: 2024-11-27
Payer: COMMERCIAL

## 2024-11-27 NOTE — OUTREACH NOTE
Maternal Screening Survey      Flowsheet Row Responses   Facility patient discharged from? [facility]   Attempt successful? Yes   Call start time 151   Call end time 1523   I have been able to laugh and see the funny side of things. 0   I have looked forward with enjoyment to things. 0   I have blamed myself unnecessarily when things went wrong. 1   I have been anxious or worried for no good reason. 2   I have felt scared or panicky for no good reason. 1   Things have been getting on top of me. 0   I have been so unhappy that I have had difficulty sleeping. 0   I have felt sad or miserable. 0   I have been so unhappy that I have been crying. 0   The thought of harming myself has occurred to me. 0   [place]  Depression Scale Total 4   Did any of your parents have problems with alcohol or drug use? Yes  [dad]   Do any of your peers have problems with alcohol or drug use? No   Does your partner have problems with alcohol or drug use? No   Before you were pregnant did you have problems with alcohol or drug use? (past) No   In the past month, did you drink beer, wine, liquor or use any other drugs? (pregnancy) No   Maternal Screening call completed Yes   Call end time 1523              JAYDON MUÑIZ - Registered Nurse

## 2024-12-28 DIAGNOSIS — F41.1 GENERALIZED ANXIETY DISORDER: ICD-10-CM

## 2024-12-30 RX ORDER — BUSPIRONE HYDROCHLORIDE 10 MG/1
TABLET ORAL
Qty: 60 TABLET | Refills: 5 | OUTPATIENT
Start: 2024-12-30

## 2025-02-21 RX ORDER — ESCITALOPRAM OXALATE 20 MG/1
20 TABLET ORAL DAILY
Qty: 30 TABLET | Refills: 2 | Status: SHIPPED | OUTPATIENT
Start: 2025-02-21

## 2025-02-21 RX ORDER — CETIRIZINE HYDROCHLORIDE 10 MG/1
10 TABLET ORAL
Qty: 30 TABLET | Refills: 2 | Status: SHIPPED | OUTPATIENT
Start: 2025-02-21

## 2025-02-21 NOTE — TELEPHONE ENCOUNTER
Called pt to schedule appt and she said she has no insurance and is working on getting some and she will call to get on the schedule. She is out of her Lexapro and said she needs a refill on it badly.